# Patient Record
Sex: FEMALE | Race: WHITE | NOT HISPANIC OR LATINO | Employment: FULL TIME | URBAN - METROPOLITAN AREA
[De-identification: names, ages, dates, MRNs, and addresses within clinical notes are randomized per-mention and may not be internally consistent; named-entity substitution may affect disease eponyms.]

---

## 2017-05-01 DIAGNOSIS — Z30.41 ENCOUNTER FOR SURVEILLANCE OF CONTRACEPTIVE PILLS: ICD-10-CM

## 2017-05-02 NOTE — TELEPHONE ENCOUNTER
levonorgestrel-ethinyl estradiol (AVIANE,FEMI,LESSINA) 0.1-20 MG-MCG per tablet  Last Written Prescription Date: 9/28/16  Last Fill Quantity: 84,  # refills: 2   Last Office Visit with JULIET, MANINDER or [facility] prescribing provider:  6/8/2016                                              ELOINA Huang  May 2, 2017  8:05 AM

## 2017-05-03 RX ORDER — LEVONORGESTREL/ETHIN.ESTRADIOL 0.1-0.02MG
1 TABLET ORAL DAILY
Qty: 84 TABLET | Refills: 0 | Status: SHIPPED | OUTPATIENT
Start: 2017-05-03 | End: 2017-07-28

## 2017-07-26 DIAGNOSIS — Z30.41 ENCOUNTER FOR SURVEILLANCE OF CONTRACEPTIVE PILLS: ICD-10-CM

## 2017-07-26 RX ORDER — LEVONORGESTREL/ETHIN.ESTRADIOL 0.1-0.02MG
TABLET ORAL
Qty: 84 TABLET | Status: CANCELLED | OUTPATIENT
Start: 2017-07-26

## 2017-07-27 NOTE — TELEPHONE ENCOUNTER
L-RHIANNON/ETH ES TABS 28'S 0.1/20        Last Written Prescription Date: 05/03/17  Last Fill Quantity: 84,  # refills: 0   Last Office Visit with G, P or [facility] prescribing provider: 06/08/16 Dr. Gomez

## 2017-07-28 ENCOUNTER — TELEPHONE (OUTPATIENT)
Dept: FAMILY MEDICINE | Facility: CLINIC | Age: 27
End: 2017-07-28

## 2017-07-28 DIAGNOSIS — Z30.41 ENCOUNTER FOR SURVEILLANCE OF CONTRACEPTIVE PILLS: ICD-10-CM

## 2017-07-28 RX ORDER — LEVONORGESTREL/ETHIN.ESTRADIOL 0.1-0.02MG
1 TABLET ORAL DAILY
Status: SHIPPED
Start: 2017-07-28 | End: 2017-08-18

## 2017-07-28 RX ORDER — LEVONORGESTREL/ETHIN.ESTRADIOL 0.1-0.02MG
1 TABLET ORAL DAILY
Qty: 28 TABLET | Refills: 0 | Status: SHIPPED | OUTPATIENT
Start: 2017-07-28 | End: 2017-08-18

## 2017-07-28 NOTE — TELEPHONE ENCOUNTER
Patient called back.  Has moved to [place].  Wondering about closer option.  Gave Brianne Cecil number.  Sent one refill to [facility] for her.  Patient will call and schedule.  Katerin Fuentes, CATALINO    Conversation: Medication Refill   (Newest Message First)   July 28, 2017   Sebastian Calero, RN        11:46 AM   Note      Left message to call back.  Last OV 6/8/16.  Offer visit. Pap due 6/26/17.   Last Rx 5/3/17 90 days. Should have pills on hand through August.  Correct?  If she is out before a visit, would she like 28 to a  local pharmacy?  Sebastian Calero, RN

## 2017-07-28 NOTE — TELEPHONE ENCOUNTER
Left message to call back.  Last OV 6/8/16.  Offer visit. Pap due 6/26/17.   Last Rx 5/3/17 90 days. Should have pills on hand through August.  Correct?  If she is out before a visit, would she like 28 to a  local pharmacy?  Sebastian Calero RN

## 2017-07-28 NOTE — TELEPHONE ENCOUNTER
See telephone encounter.  Could not get it to work in here.  Note to mail order.  Katerin Fuentes RN

## 2017-08-04 NOTE — TELEPHONE ENCOUNTER
Fax from [facility]. It appears they are requesting refill of OCP again.  I sent fax back with note one month sent to local pharmacy, office visit required.    ALEXANDR Peña

## 2017-08-18 ENCOUNTER — OFFICE VISIT (OUTPATIENT)
Dept: FAMILY MEDICINE | Facility: CLINIC | Age: 27
End: 2017-08-18
Payer: COMMERCIAL

## 2017-08-18 VITALS
TEMPERATURE: 98.8 F | OXYGEN SATURATION: 100 % | SYSTOLIC BLOOD PRESSURE: 106 MMHG | HEART RATE: 82 BPM | DIASTOLIC BLOOD PRESSURE: 64 MMHG | BODY MASS INDEX: 28.56 KG/M2 | WEIGHT: 182 LBS | HEIGHT: 67 IN | RESPIRATION RATE: 16 BRPM

## 2017-08-18 DIAGNOSIS — Z00.00 ENCOUNTER FOR ROUTINE ADULT HEALTH EXAMINATION WITHOUT ABNORMAL FINDINGS: Primary | ICD-10-CM

## 2017-08-18 DIAGNOSIS — E66.3 OVERWEIGHT (BMI 25.0-29.9): ICD-10-CM

## 2017-08-18 DIAGNOSIS — Z30.41 ENCOUNTER FOR SURVEILLANCE OF CONTRACEPTIVE PILLS: ICD-10-CM

## 2017-08-18 DIAGNOSIS — N89.8 VAGINAL ITCHING: ICD-10-CM

## 2017-08-18 DIAGNOSIS — Z12.4 SCREENING FOR MALIGNANT NEOPLASM OF CERVIX: ICD-10-CM

## 2017-08-18 DIAGNOSIS — L91.0 KELOID SCAR: ICD-10-CM

## 2017-08-18 LAB
SPECIMEN SOURCE: NORMAL
WET PREP SPEC: NORMAL

## 2017-08-18 PROCEDURE — 87210 SMEAR WET MOUNT SALINE/INK: CPT | Performed by: PHYSICIAN ASSISTANT

## 2017-08-18 PROCEDURE — 99395 PREV VISIT EST AGE 18-39: CPT | Performed by: PHYSICIAN ASSISTANT

## 2017-08-18 PROCEDURE — G0145 SCR C/V CYTO,THINLAYER,RESCR: HCPCS | Performed by: PHYSICIAN ASSISTANT

## 2017-08-18 RX ORDER — LEVONORGESTREL/ETHIN.ESTRADIOL 0.1-0.02MG
1 TABLET ORAL DAILY
Qty: 84 TABLET | Refills: 3 | Status: SHIPPED | OUTPATIENT
Start: 2017-08-18 | End: 2018-07-20

## 2017-08-18 NOTE — LETTER
August 18, 2017        Lizzeth Alarcon  [address] [place] [address]    Yusef Moreau,    I have reviewed your recent labs. Here are the results:    -Your vaginal swab was negative for any yeast or bacteria.     If you have any questions please do not hesitate to contact our office via phone ([phone]) or Jybehart by clicking the contact my Care Team link.    If you have further questions about the interpretation of your lab results, [email or web address] is a great website to check out.    Thank you for allowing me to participate in your care!    ALTA De León, PAHannyC  [facility]

## 2017-08-18 NOTE — PROGRESS NOTES
"Chief Complaint   Patient presents with     Physical       Initial /64  Pulse 82  Temp 98.8  F (37.1  C)  Resp 16  Ht 5' 7\" (1.702 m)  Wt 182 lb (82.6 kg)  LMP 07/24/2017 (Exact Date)  SpO2 100%  BMI 28.51 kg/m2 Estimated body mass index is 28.51 kg/(m^2) as calculated from the following:    Height as of this encounter: 5' 7\" (1.702 m).    Weight as of this encounter: 182 lb (82.6 kg).  Medication Reconciliation: complete. FER Strickland LPN       SUBJECTIVE:   CC: Lizzeth Alarcon is an 27 year old woman who presents for preventive health visit.     Healthy Habits:    Do you get at least three servings of calcium containing foods daily (dairy, green leafy vegetables, etc.)? yes    Amount of exercise or daily activities, outside of work: 0 day(s) per week    Problems taking medications regularly No    Medication side effects: No    Have you had an eye exam in the past two years? yes    Do you see a dentist twice per year? yes    Do you have sleep apnea, excessive snoring or daytime drowsiness?no          -------------------------------------    Today's PHQ-2 Score: PHQ-2 ( 1999 [employer]) 8/18/2017 6/8/2016   Q1: Little interest or pleasure in doing things 0 0   Q2: Feeling down, depressed or hopeless 0 0   PHQ-2 Score 0 0         Abuse: Current or Past(Physical, Sexual or Emotional)- No  Do you feel safe in your environment - Yes  Social History   Substance Use Topics     Smoking status: Never Smoker     Smokeless tobacco: Never Used      Comment: no second hand smoke     Alcohol use Yes      Comment: 3-4/week     The patient does not drink >3 drinks per day nor >7 drinks per week.    Reviewed orders with patient.  Reviewed health maintenance and updated orders accordingly - Yes    FER Strickland LPN        Labs reviewed in EPIC  Patient Active Problem List   Diagnosis     Acne     Contraceptive management     Keloid scar     Contraception     BMI > 25     Past Surgical History:   Procedure Laterality Date     NO " HISTORY OF SURGERY         Social History   Substance Use Topics     Smoking status: Never Smoker     Smokeless tobacco: Never Used      Comment: no second hand smoke     Alcohol use Yes      Comment: 3-4/week     Family History   Problem Relation Age of Onset     Thyroid Disease Mother      Respiratory Father      Asthma     Respiratory Brother      Exercised induced Asthma     Colon Cancer No family hx of      Breast Cancer No family hx of      DIABETES No family hx of      Myocardial Infarction No family hx of          Current Outpatient Prescriptions   Medication Sig Dispense Refill     levonorgestrel-ethinyl estradiol (AVIANE,ALESSE,LESSINA) 0.1-20 MG-MCG per tablet Take 1 tablet by mouth daily 84 tablet 3     [DISCONTINUED] levonorgestrel-ethinyl estradiol (AVIANE,ALESSE,LESSINA) 0.1-20 MG-MCG per tablet Take 1 tablet by mouth daily 28 tablet 0     [DISCONTINUED] levonorgestrel-ethinyl estradiol (AVIANE,ALESSE,LESSINA) 0.1-20 MG-MCG per tablet Take 1 tablet by mouth daily (Patient not taking: Reported on 8/18/2017)       Allergies   Allergen Reactions     Minocycline      Joint pains         Mammogram not appropriate for this patient based on age.    Pertinent mammograms are reviewed under the imaging tab.  History of abnormal Pap smear: NO - age 21-29 PAP every 3 years recommended    Reviewed and updated as needed this visit by clinical staffTobacco  Allergies  Meds  Fam Hx  Soc Hx        Reviewed and updated as needed this visit by Provider              ROS:  C: NEGATIVE for fever, chills, change in weight  I: NEGATIVE for worrisome rashes, moles or lesions  E: NEGATIVE for vision changes or irritation  ENT: NEGATIVE for ear, mouth and throat problems  R: NEGATIVE for significant cough or SOB  B: NEGATIVE for masses, tenderness or discharge  CV: NEGATIVE for chest pain, palpitations or peripheral edema  GI: NEGATIVE for nausea, abdominal pain, heartburn, or change in bowel habits  : NEGATIVE for  "unusual urinary or vaginal symptoms. Periods are regular.  M: NEGATIVE for significant arthralgias or myalgia  N: NEGATIVE for weakness, dizziness or paresthesias  P: NEGATIVE for changes in mood or affect    OBJECTIVE:   /64  Pulse 82  Temp 98.8  F (37.1  C)  Resp 16  Ht 5' 7\" (1.702 m)  Wt 182 lb (82.6 kg)  LMP 07/24/2017 (Exact Date)  SpO2 100%  BMI 28.51 kg/m2  EXAM:  GENERAL: healthy, alert and no distress  EYES: Eyes grossly normal to inspection, PERRL and conjunctivae and sclerae normal  HENT: ear canals and TM's normal, nose and mouth without ulcers or lesions  NECK: no adenopathy, no asymmetry, masses, or scars and thyroid normal to palpation  RESP: lungs clear to auscultation - no rales, rhonchi or wheezes  BREAST: normal without masses, tenderness or nipple discharge and no palpable axillary masses or adenopathy  CV: regular rate and rhythm, normal S1 S2, no S3 or S4, no murmur, click or rub, no peripheral edema and peripheral pulses strong  ABDOMEN: soft, nontender, no hepatosplenomegaly, no masses and bowel sounds normal   (female): normal female external genitalia, normal urethral meatus, vaginal mucosa pink, moist, well rugated, and normal cervix/adnexa/uterus without masses or discharge. Pap collected.   MS: no gross musculoskeletal defects noted, no edema  SKIN: no suspicious lesions or rashes. Keloid scarring on chest and left upper back.   NEURO: Normal strength and tone, mentation intact and speech normal  PSYCH: mentation appears normal, affect normal/bright    ASSESSMENT/PLAN:   Lizzeth was seen today for physical.    Diagnoses and all orders for this visit:    Encounter for routine adult health examination without abnormal findings    Screening for malignant neoplasm of cervix  -     Pap imaged thin layer screen reflex to HPV if ASCUS - recommend age 25 - 29    Encounter for surveillance of contraceptive pills  -     levonorgestrel-ethinyl estradiol (AVIANE,ALESSE,LESSINA) " "0.1-20 MG-MCG per tablet; Take 1 tablet by mouth daily    BMI > 25    Keloid scar      COME FASTING NEXT YR FOR LABS. *    COUNSELING:   Reviewed preventive health counseling, as reflected in patient instructions    BP Screening:   Last 3 BP Readings:    BP Readings from Last 3 Encounters:   08/18/17 106/64   06/08/16 125/88   08/28/15 116/74       The following was recommended to the patient:  Re-screen BP within a year and recommended lifestyle modifications     reports that she has never smoked. She has never used smokeless tobacco.    Estimated body mass index is 28.51 kg/(m^2) as calculated from the following:    Height as of this encounter: 5' 7\" (1.702 m).    Weight as of this encounter: 182 lb (82.6 kg).   Weight management plan: Discussed healthy diet and exercise guidelines and patient will follow up in 12 months in clinic to re-evaluate.    Counseling Resources:  ATP IV Guidelines  Pooled Cohorts Equation Calculator  Breast Cancer Risk Calculator  FRAX Risk Assessment  ICSI Preventive Guidelines  Dietary Guidelines for Americans, 2010  USDA's MyPlate  ASA Prophylaxis  Lung CA Screening    Stella Espinoza PA-C  [facility]  "

## 2017-08-18 NOTE — MR AVS SNAPSHOT
After Visit Summary   8/18/2017    Lizzeth Alarcon    MRN: [id number]           Patient Information     Date Of Birth          1990        Visit Information        Provider Department      8/18/2017 7:40 AM Stella Espinoza PA-C [facility]        Today's Diagnoses     Screening for malignant neoplasm of cervix    -  1    Encounter for surveillance of contraceptive pills          Care Instructions      Preventive Health Recommendations  Female Ages 26 - 39  Yearly exam:   See your health care provider every year in order to    Review health changes.     Discuss preventive care.      Review your medicines if you your doctor has prescribed any.    Until age 30: Get a Pap test every three years (more often if you have had an abnormal result).    After age 30: Talk to your doctor about whether you should have a Pap test every 3 years or have a Pap test with HPV screening every 5 years.   You do not need a Pap test if your uterus was removed (hysterectomy) and you have not had cancer.  You should be tested each year for STDs (sexually transmitted diseases), if you're at risk.   Talk to your provider about how often to have your cholesterol checked.  If you are at risk for diabetes, you should have a diabetes test (fasting glucose).  Shots: Get a flu shot each year. Get a tetanus shot every 10 years.   Nutrition:     Eat at least 5 servings of fruits and vegetables each day.    Eat whole-grain bread, whole-wheat pasta and brown rice instead of white grains and rice.    Talk to your provider about Calcium and Vitamin D.     Lifestyle    Exercise at least 150 minutes a week (30 minutes a day, 5 days of the week). This will help you control your weight and prevent disease.    Limit alcohol to one drink per day.    No smoking.     Wear sunscreen to prevent skin cancer.    See your dentist every six months for an exam and cleaning.            Follow-ups after your visit        Who  "to contact     If you have questions or need follow up information about today's clinic visit or your schedule please contact [facility] [place] directly at [phone].  Normal or non-critical lab and imaging results will be communicated to you by MyChart, letter or phone within 4 business days after the clinic has received the results. If you do not hear from us within 7 days, please contact the clinic through MyChart or phone. If you have a critical or abnormal lab result, we will notify you by phone as soon as possible.  Submit refill requests through [facility] or call your pharmacy and they will forward the refill request to us. Please allow 3 business days for your refill to be completed.          Additional Information About Your Visit        [facility] [facility] Information     [facility] lets you send messages to your doctor, view your test results, renew your prescriptions, schedule appointments and more. To sign up, go to [email or web address] . Click on \"Log in\" on the left side of the screen, which will take you to the Welcome page. Then click on \"Sign up Now\" on the right side of the page.     You will be asked to enter the access code listed below, as well as some personal information. Please follow the directions to create your username and password.     Your access code is: [email or web address]  Expires: 2017  8:04 AM     Your access code will  in 90 days. If you need help or a new code, please call your [place] clinic or [phone].        Care EveryWhere ID     This is your Care EveryWhere ID. This could be used by other organizations to access your [place] medical records  [id number]        Your Vitals Were     Pulse Temperature Respirations Height Last Period Pulse Oximetry    82 98.8  F (37.1  C) 16 5' 7\" (1.702 m) 2017 (Exact Date) 100%    BMI (Body Mass Index)                   28.51 kg/m2            Blood Pressure from Last 3 Encounters:   17 106/64   16 125/88 "   08/28/15 116/74    Weight from Last 3 Encounters:   08/18/17 182 lb (82.6 kg)   06/08/16 158 lb (71.7 kg)   08/28/15 153 lb 9.6 oz (69.7 kg)              We Performed the Following     Pap imaged thin layer screen reflex to HPV if ASCUS - recommend age 25 - 29          Where to get your medicines      Some of these will need a paper prescription and others can be bought over the counter.  Ask your nurse if you have questions.     Bring a paper prescription for each of these medications     levonorgestrel-ethinyl estradiol 0.1-20 MG-MCG per tablet          Primary Care Provider Office Phone # Fax #    Stella Espinoza PA-C [phone]       [address]  [place] [address]        Equal Access to Services     [facility]: Hadii jennifer Cavazos, waaxda luawilda, qaybta kaalmakaren mcleod, muriel tate . So [facility] [phone].    ATENCIÓN: Si habla español, tiene a rai disposición servicios gratuitos de asistencia lingüística. [facility] [phone].    We comply with applicable federal civil rights laws and [place] laws. We do not discriminate on the basis of race, color, national origin, age, disability sex, sexual orientation or gender identity.            Thank you!     Thank you for choosing [facility] [place]  for your care. Our goal is always to provide you with excellent care. Hearing back from our patients is one way we can continue to improve our services. Please take a few minutes to complete the written survey that you may receive in the mail after your visit with us. Thank you!             Your Updated Medication List - Protect others around you: Learn how to safely use, store and throw away your medicines at www.disposemymeds.org.          This list is accurate as of: 8/18/17  8:04 AM.  Always use your most recent med list.                   Brand Name Dispense Instructions for use Diagnosis    levonorgestrel-ethinyl  estradiol 0.1-20 MG-MCG per tablet    AVIANE,ALESSE,LESSINA    84 tablet    Take 1 tablet by mouth daily    Encounter for surveillance of contraceptive pills

## 2017-08-18 NOTE — LETTER
August 31, 2017      Lizzeth Dorian  [address] [place] [address]    Dear ,      I am happy to inform you that your recent cervical cancer screening test (PAP smear) was normal.      Preventative screenings such as this help to ensure your health for years to come. You should repeat a pap smear in 3 years, unless otherwise directed.      You will still need to return to the clinic every year for your annual exam and other preventive tests.     Please contact the clinic at [phone] if you have further questions.       Sincerely,      Stella Espinoza PA-C/froilan

## 2017-08-22 LAB
COPATH REPORT: NORMAL
PAP: NORMAL

## 2018-07-20 DIAGNOSIS — Z30.41 ENCOUNTER FOR SURVEILLANCE OF CONTRACEPTIVE PILLS: ICD-10-CM

## 2018-07-20 NOTE — TELEPHONE ENCOUNTER
"Requested Prescriptions   Pending Prescriptions Disp Refills     levonorgestrel-ethinyl estradiol (AVIANE,FEMI,LESSINA) 0.1-20 MG-MCG per tablet  Last Written Prescription Date:  8/8/17  Last Fill Quantity: 84,  # refills: 3   Last office visit: 8/18/2017 with prescribing provider:  melvina   Future Office Visit:     84 tablet 3     Sig: Take 1 tablet by mouth daily    Contraceptives Protocol Failed    7/20/2018  8:32 AM       Failed - Recent (12 mo) or future (30 days) visit within the authorizing provider's specialty    Patient had office visit in the last 12 months or has a visit in the next 30 days with authorizing provider or within the authorizing provider's specialty.  See \"Patient Info\" tab in inbasket, or \"Choose Columns\" in Meds & Orders section of the refill encounter.           Passed - Patient is not a current smoker if age is 35 or older       Passed - No active pregnancy on record       Passed - No positive pregnancy test in past 12 months          "

## 2018-07-23 RX ORDER — LEVONORGESTREL/ETHIN.ESTRADIOL 0.1-0.02MG
1 TABLET ORAL DAILY
Qty: 28 TABLET | Refills: 0 | Status: SHIPPED | OUTPATIENT
Start: 2018-07-23 | End: 2018-08-21

## 2018-07-23 NOTE — TELEPHONE ENCOUNTER
30 day supply given.  Patient is due for an OV.  Please call and assist with scheduling appointment prior to next refill   Tali Nieves RN - Triage  [facility]

## 2018-08-21 ENCOUNTER — OFFICE VISIT (OUTPATIENT)
Dept: FAMILY MEDICINE | Facility: CLINIC | Age: 28
End: 2018-08-21
Payer: COMMERCIAL

## 2018-08-21 VITALS
OXYGEN SATURATION: 100 % | HEART RATE: 60 BPM | DIASTOLIC BLOOD PRESSURE: 78 MMHG | BODY MASS INDEX: 25.43 KG/M2 | HEIGHT: 67 IN | SYSTOLIC BLOOD PRESSURE: 115 MMHG | WEIGHT: 162 LBS | TEMPERATURE: 99.1 F

## 2018-08-21 DIAGNOSIS — Z00.00 ANNUAL VISIT FOR GENERAL ADULT MEDICAL EXAMINATION WITHOUT ABNORMAL FINDINGS: Primary | ICD-10-CM

## 2018-08-21 DIAGNOSIS — Z30.41 ENCOUNTER FOR SURVEILLANCE OF CONTRACEPTIVE PILLS: ICD-10-CM

## 2018-08-21 PROCEDURE — 99395 PREV VISIT EST AGE 18-39: CPT | Performed by: INTERNAL MEDICINE

## 2018-08-21 RX ORDER — LEVONORGESTREL/ETHIN.ESTRADIOL 0.1-0.02MG
1 TABLET ORAL DAILY
Qty: 84 TABLET | Refills: 3 | Status: SHIPPED | OUTPATIENT
Start: 2018-08-21 | End: 2019-06-03

## 2018-08-21 NOTE — PROGRESS NOTES
Answers for HPI/ROS submitted by the patient on 8/20/2018   Annual Exam:  Getting at least 3 servings of Calcium per day:: Yes  Bi-annual eye exam:: Yes  Dental care twice a year:: Yes  Sleep apnea or symptoms of sleep apnea:: None  Frequency of exercise:: 4-5 days/week  Taking medications regularly:: Yes  Medication side effects:: None  Additional concerns today:: No  PHQ-2 Score: 0  Duration of exercise:: 45-60 minutes

## 2018-08-21 NOTE — PROGRESS NOTES
SUBJECTIVE:   CC: Lizzeth Alarcon is an 28 year old woman who presents for preventive health visit.     Physical   Annual:     Getting at least 3 servings of Calcium per day:  Yes    Bi-annual eye exam:  Yes    Dental care twice a year:  Yes    Sleep apnea or symptoms of sleep apnea:  None    Frequency of exercise:  4-5 days/week    Duration of exercise:  45-60 minutes    Taking medications regularly:  Yes    Medication side effects:  None    Additional concerns today:  No        Today's PHQ-2 Score:   PHQ-2 ( 1999 [employer]) 8/20/2018   Q1: Little interest or pleasure in doing things 0   Q2: Feeling down, depressed or hopeless 0   PHQ-2 Score 0   Q1: Little interest or pleasure in doing things Not at all   Q2: Feeling down, depressed or hopeless Not at all   PHQ-2 Score 0       Abuse: Current or Past(Physical, Sexual or Emotional)- No  Do you feel safe in your environment - Yes    Social History   Substance Use Topics     Smoking status: Never Smoker     Smokeless tobacco: Never Used      Comment: no second hand smoke     Alcohol use Yes      Comment: 3-4/week     Alcohol Use 8/20/2018   If you drink alcohol do you typically have greater than 3 drinks per day OR greater than 7 drinks per week? No       Reviewed orders with patient.  Reviewed health maintenance and updated orders accordingly - Yes  BP Readings from Last 3 Encounters:   08/21/18 115/78   08/18/17 106/64   06/08/16 125/88    Wt Readings from Last 3 Encounters:   08/21/18 162 lb (73.5 kg)   08/18/17 182 lb (82.6 kg)   06/08/16 158 lb (71.7 kg)                  Patient Active Problem List   Diagnosis     Acne     Contraceptive management     Keloid scar     Contraception     BMI > 25     Past Surgical History:   Procedure Laterality Date     NO HISTORY OF SURGERY         Social History   Substance Use Topics     Smoking status: Never Smoker     Smokeless tobacco: Never Used      Comment: no second hand smoke     Alcohol use Yes      Comment: 3-4/week     " Family History   Problem Relation Age of Onset     Thyroid Disease Mother      Respiratory Father      Asthma     Respiratory Brother      Exercised induced Asthma     Colon Cancer No family hx of      Breast Cancer No family hx of      Diabetes No family hx of      Myocardial Infarction No family hx of          Current Outpatient Prescriptions   Medication Sig Dispense Refill     levonorgestrel-ethinyl estradiol (AVIANE,ALESSE,LESSINA) 0.1-20 MG-MCG per tablet Take 1 tablet by mouth daily 84 tablet 3     [DISCONTINUED] levonorgestrel-ethinyl estradiol (AVIANE,ALESSE,LESSINA) 0.1-20 MG-MCG per tablet Take 1 tablet by mouth daily 28 tablet 0       Mammogram not appropriate for this patient based on age.    Pertinent mammograms are reviewed under the imaging tab.  History of abnormal Pap smear: NO - age 21-29 PAP every 3 years recommended  PAP / HPV 8/18/2017 6/26/2014 1/15/2009   PAP NIL NIL NIL     Reviewed and updated as needed this visit by clinical staff  Allergies  Meds         Reviewed and updated as needed this visit by Provider            Review of Systems  CONSTITUTIONAL: NEGATIVE for fever, chills, change in weight  INTEGUMENTARU/SKIN: NEGATIVE for worrisome rashes, moles or lesions  EYES: NEGATIVE for vision changes or irritation  ENT: NEGATIVE for ear, mouth and throat problems  RESP: NEGATIVE for significant cough or SOB  BREAST: NEGATIVE for masses, tenderness or discharge  CV: NEGATIVE for chest pain, palpitations or peripheral edema  GI: NEGATIVE for nausea, abdominal pain, heartburn, or change in bowel habits  : NEGATIVE for unusual urinary or vaginal symptoms. Periods are regular.  MUSCULOSKELETAL: NEGATIVE for significant arthralgias or myalgia  NEURO: NEGATIVE for weakness, dizziness or paresthesias  PSYCHIATRIC: NEGATIVE for changes in mood or affect     OBJECTIVE:   /78 (BP Location: Right arm, Cuff Size: Adult Regular)  Pulse 60  Temp 99.1  F (37.3  C) (Oral)  Ht 5' 7\" (1.702 m)  " "Wt 162 lb (73.5 kg)  LMP 07/31/2018  SpO2 100%  BMI 25.37 kg/m2  Physical Exam  GENERAL: healthy, alert and no distress  EYES: Eyes grossly normal to inspection, PERRL and conjunctivae and sclerae normal  HENT: ear canals and TM's normal, nose and mouth without ulcers or lesions  NECK: no adenopathy, no asymmetry, masses, or scars and thyroid normal to palpation  RESP: lungs clear to auscultation - no rales, rhonchi or wheezes  BREAST: normal without masses, tenderness or nipple discharge and no palpable axillary masses or adenopathy  CV: regular rate and rhythm, normal S1 S2, no S3 or S4, no murmur, click or rub, no peripheral edema and peripheral pulses strong  ABDOMEN: soft, nontender, no hepatosplenomegaly, no masses and bowel sounds normal  MS: no gross musculoskeletal defects noted, no edema  SKIN: no suspicious lesions or rashes  NEURO: Normal strength and tone, mentation intact and speech normal  PSYCH: mentation appears normal, affect normal/bright    Diagnostic Test Results:  none     ASSESSMENT/PLAN:   1. Annual visit for general adult medical examination without abnormal findings    2. Encounter for surveillance of contraceptive pills  - levonorgestrel-ethinyl estradiol (AVIANE,ALESSE,LESSINA) 0.1-20 MG-MCG per tablet; Take 1 tablet by mouth daily  Dispense: 84 tablet; Refill: 3    COUNSELING:  Reviewed preventive health counseling, as reflected in patient instructions       Regular exercise       Healthy diet/nutrition       Contraception       Osteoporosis Prevention/Bone Health    BP Readings from Last 1 Encounters:   08/18/17 106/64     Estimated body mass index is 28.51 kg/(m^2) as calculated from the following:    Height as of 8/18/17: 5' 7\" (1.702 m).    Weight as of 8/18/17: 182 lb (82.6 kg).           reports that she has never smoked. She has never used smokeless tobacco.      Counseling Resources:  ATP IV Guidelines  Pooled Cohorts Equation Calculator  Breast Cancer Risk Calculator  FRAX " Risk Assessment  ICSI Preventive Guidelines  Dietary Guidelines for Americans, 2010  USDA's MyPlate  ASA Prophylaxis  Lung CA Screening    Stella Jones MD  [facility] [place]  Answers for HPI/ROS submitted by the patient on 8/20/2018   PHQ-2 Score: 0

## 2018-08-21 NOTE — MR AVS SNAPSHOT
After Visit Summary   8/21/2018    Lizzeth Alarcon    MRN: [id number]           Patient Information     Date Of Birth          1990        Visit Information        Provider Department      8/21/2018 8:00 AM Stella Jones MD [facility]        Today's Diagnoses     Annual visit for general adult medical examination without abnormal findings    -  1    Encounter for surveillance of contraceptive pills          Care Instructions      Preventive Health Recommendations  Female Ages 26 - 39  Yearly exam:   See your health care provider every year in order to    Review health changes.     Discuss preventive care.      Review your medicines if you your doctor has prescribed any.    Until age 30: Get a Pap test every three years (more often if you have had an abnormal result).    After age 30: Talk to your doctor about whether you should have a Pap test every 3 years or have a Pap test with HPV screening every 5 years.   You do not need a Pap test if your uterus was removed (hysterectomy) and you have not had cancer.  You should be tested each year for STDs (sexually transmitted diseases), if you're at risk.   Talk to your provider about how often to have your cholesterol checked.  If you are at risk for diabetes, you should have a diabetes test (fasting glucose).  Shots: Get a flu shot each year. Get a tetanus shot every 10 years.   Nutrition:     Eat at least 5 servings of fruits and vegetables each day.    Eat whole-grain bread, whole-wheat pasta and brown rice instead of white grains and rice.    Get adequate Calcium and Vitamin D.     Lifestyle    Exercise at least 150 minutes a week (30 minutes a day, 5 days of the week). This will help you control your weight and prevent disease.    Limit alcohol to one drink per day.    No smoking.     Wear sunscreen to prevent skin cancer.    See your dentist every six months for an exam and cleaning.            Follow-ups after your visit    "     Follow-up notes from your care team     Return in about 1 year (around 8/21/2019) for Physical Exam.      Who to contact     If you have questions or need follow up information about today's clinic visit or your schedule please contact [facility] [place] directly at [phone].  Normal or non-critical lab and imaging results will be communicated to you by MyChart, letter or phone within 4 business days after the clinic has received the results. If you do not hear from us within 7 days, please contact the clinic through [employer] or phone. If you have a critical or abnormal lab result, we will notify you by phone as soon as possible.  Submit refill requests through eEye or call your pharmacy and they will forward the refill request to us. Please allow 3 business days for your refill to be completed.          Additional Information About Your Visit        MyChart Information     eEye gives you secure access to your electronic health record. If you see a primary care provider, you can also send messages to your care team and make appointments. If you have questions, please call your primary care clinic.  If you do not have a primary care provider, please call [phone] and they will assist you.        Care EveryWhere ID     This is your Care EveryWhere ID. This could be used by other organizations to access your [facility] medical records  [id number]        Your Vitals Were     Pulse Temperature Height Last Period Pulse Oximetry BMI (Body Mass Index)    60 99.1  F (37.3  C) (Oral) 5' 7\" (1.702 m) 07/31/2018 100% 25.37 kg/m2       Blood Pressure from Last 3 Encounters:   08/21/18 115/78   08/18/17 106/64   06/08/16 125/88    Weight from Last 3 Encounters:   08/21/18 162 lb (73.5 kg)   08/18/17 182 lb (82.6 kg)   06/08/16 158 lb (71.7 kg)              Today, you had the following     No orders found for display         Where to get your medicines      These medications were sent to [facility] - [place], [place] - [address] [place] [address]  [address] [place] [id number]     Phone:  [phone]     levonorgestrel-ethinyl estradiol 0.1-20 MG-MCG per tablet          Primary Care Provider Office Phone # Fax #    [facility] [phone]       [address] [facility] [address]        Equal Access to Services     CELSO FORTE : Hadii aad ku hadasho Soomaali, waaxda luqadaha, qaybta kaalmada adeegyada, waxay idiin hayaan adeeg kharash la'aan . So [facility] [phone].    ATENCIÓN: Si habla español, tiene a rai disposición servicios gratuitos de asistencia lingüística. [facility] [phone].    We comply with applicable federal civil rights laws and [place] laws. We do not discriminate on the basis of race, color, national origin, age, disability, sex, sexual orientation, or gender identity.            Thank you!     Thank you for choosing [facility]  for your care. Our goal is always to provide you with excellent care. Hearing back from our patients is one way we can continue to improve our services. Please take a few minutes to complete the written survey that you may receive in the mail after your visit with us. Thank you!             Your Updated Medication List - Protect others around you: Learn how to safely use, store and throw away your medicines at www.disposemymeds.org.          This list is accurate as of 8/21/18  8:21 AM.  Always use your most recent med list.                   Brand Name Dispense Instructions for use Diagnosis    levonorgestrel-ethinyl estradiol 0.1-20 MG-MCG per tablet    AVIANE,ALESSE,LESSINA    84 tablet    Take 1 tablet by mouth daily    Encounter for surveillance of contraceptive pills

## 2019-06-03 ENCOUNTER — OFFICE VISIT (OUTPATIENT)
Dept: FAMILY MEDICINE | Facility: CLINIC | Age: 29
End: 2019-06-03
Payer: COMMERCIAL

## 2019-06-03 VITALS — DIASTOLIC BLOOD PRESSURE: 70 MMHG | SYSTOLIC BLOOD PRESSURE: 110 MMHG

## 2019-06-03 DIAGNOSIS — Z92.29 HISTORY OF ISOTRETINOIN THERAPY: ICD-10-CM

## 2019-06-03 DIAGNOSIS — L70.0 ACNE VULGARIS: Primary | ICD-10-CM

## 2019-06-03 PROCEDURE — 99214 OFFICE O/P EST MOD 30 MIN: CPT | Performed by: FAMILY MEDICINE

## 2019-06-03 RX ORDER — SPIRONOLACTONE 50 MG/1
50 TABLET, FILM COATED ORAL DAILY
Qty: 90 TABLET | Refills: 1 | Status: SHIPPED | OUTPATIENT
Start: 2019-06-03 | End: 2019-07-22

## 2019-06-03 RX ORDER — DESOGESTREL AND ETHINYL ESTRADIOL 0.15-0.03
1 KIT ORAL DAILY
Refills: 0 | COMMUNITY
Start: 2019-05-19 | End: 2023-05-26

## 2019-06-03 NOTE — PATIENT INSTRUCTIONS
FUTURE APPOINTMENTS  Follow up in 6-8 weeks.    ORAL MEDICATION  Take by mouth 1 capsule(s)/tablet(s) of spironolactone 50 mg once daily.

## 2019-06-03 NOTE — LETTER
6/3/2019         RE: Lizzeth Alarcon  [address] [place] [address]        Dear Colleague,    Thank you for referring your patient, Lizzeth Alarcon, to the [facility] [place]. Please see a copy of my visit note below.    [facility] - PRIMARY CARE SKIN    CC: Acne  SUBJECTIVE:   Lizzeth Alarcon is a(n) 29 year old female who presents to clinic today because of acne.    Symptoms have been ongoing for: years. Acne relapsed shortly after finishing oral isotretinoin in the past (10 years ago).  The acne is primarily located on the: back, chest and face. Acne is more of an issue on the face then back and chest.  Acne generally presents as: closed comedone(s) and pimple(s).    Current treatment: Differin, Neutrogena cleansers, OCP.  Response to treatment: Acne is not well controlled.  Side effects noted: None.    Previous treatments include: A course of oral isotretinoin was taken approximately 10 years ago.  clindamycin 1% gel, Benazaclin, minocycline  She is on oral hormonal contraceptive levonorgestrel-ethinyl estradiol 0.1-0.02 mg.    Skin type: oily.  Family history of acne: YES.  Risk factors for acne: menstrual cycle.    Refer to electronic medical record (EMR) for past medical history and medications.    INTEGUMENTARY/SKIN: POSITIVE for acne  ROS: 14 point review of systems was negative except the symptoms listed above in the HPI.    This document serves as a record of the services and decisions personally performed and made by Shobha Guzman MD and was created by Abilio Ramírez, a trained medical scribe, based on personal observations and provider statements to the medical scribe.  Mikaela 3, 2019 4:13 PM   Abilio Ramírez    OBJECTIVE:   GENERAL: healthy, alert and no distress.  SKIN: Que[place] Skin Type - II.  Face, Neck and Trunk examined. The dermatoscope was used to help evaluate pigmented lesions.  Skin Pertinent Findings:  Face: Multiple scattered inflammatory papules.  Some closed comedones. Cheeks, chin and forehead. Moderate scarring  Chest: Keloid on the mid-chest  4 cm X 3 cm  Back: Few scattered inflammatory papules.    Diagnostic Test Results:  none     ASSESSMENT:     Encounter Diagnoses   Name Primary?     Acne vulgaris Yes     History of isotretinoin therapy      MDM: . Discussed pathophysiology of acne, treatment options, and expectations for treatment response. Consider repeat course of oral isotretinoin .    PLAN:   Patient Instructions   FUTURE APPOINTMENTS  Follow up in 6-8 weeks.    ORAL MEDICATION  Take by mouth 1 capsule(s)/tablet(s) of spironolactone 50 mg once daily.    TT: 20 minutes.  CT: 15 minutes.    The information in this document, created by the medical scribe for me, accurately reflects the services I personally performed and the decisions made by me. I have reviewed and approved this document for accuracy prior to leaving the patient care area.  Mikaela 3, 2019 4:13 PM  Shobha Guzman MD  [facility]    Again, thank you for allowing me to participate in the care of your patient.        Sincerely,        Shobha Guzman MD

## 2019-06-03 NOTE — PROGRESS NOTES
[facility] - PRIMARY CARE SKIN    CC: Acne  SUBJECTIVE:   Lizzeth Alarcon is a(n) 29 year old female who presents to clinic today because of acne.    Symptoms have been ongoing for: years. Acne relapsed shortly after finishing oral isotretinoin in the past (10 years ago).  The acne is primarily located on the: back, chest and face. Acne is more of an issue on the face then back and chest.  Acne generally presents as: closed comedone(s) and pimple(s).    Current treatment: Differin, Neutrogena cleansers, OCP.  Response to treatment: Acne is not well controlled.  Side effects noted: None.    Previous treatments include: A course of oral isotretinoin was taken approximately 10 years ago.  clindamycin 1% gel, Benazaclin, minocycline  She is on oral hormonal contraceptive levonorgestrel-ethinyl estradiol 0.1-0.02 mg.    Skin type: oily.  Family history of acne: YES.  Risk factors for acne: menstrual cycle.    Refer to electronic medical record (EMR) for past medical history and medications.    INTEGUMENTARY/SKIN: POSITIVE for acne  ROS: 14 point review of systems was negative except the symptoms listed above in the HPI.    This document serves as a record of the services and decisions personally performed and made by Shobha Guzman MD and was created by Abilio Ramírez, a trained medical scribe, based on personal observations and provider statements to the medical scribe.  Mikaela 3, 2019 4:13 PM   Abilio Ramírez    OBJECTIVE:   GENERAL: healthy, alert and no distress.  SKIN: Quezada Skin Type - II.  Face, Neck and Trunk examined. The dermatoscope was used to help evaluate pigmented lesions.  Skin Pertinent Findings:  Face: Multiple scattered inflammatory papules. Some closed comedones. Cheeks, chin and forehead. Moderate scarring  Chest: Keloid on the mid-chest  4 cm X 3 cm  Back: Few scattered inflammatory papules.    Diagnostic Test Results:  none     ASSESSMENT:     Encounter Diagnoses   Name Primary?     Acne  vulgaris Yes     History of isotretinoin therapy      MDM: . Discussed pathophysiology of acne, treatment options, and expectations for treatment response. Consider repeat course of oral isotretinoin .    PLAN:   Patient Instructions   FUTURE APPOINTMENTS  Follow up in 6-8 weeks.    ORAL MEDICATION  Take by mouth 1 capsule(s)/tablet(s) of spironolactone 50 mg once daily.    TT: 20 minutes.  CT: 15 minutes.    The information in this document, created by the medical scribe for me, accurately reflects the services I personally performed and the decisions made by me. I have reviewed and approved this document for accuracy prior to leaving the patient care area.  Mikaela 3, 2019 4:13 PM  Shobha Guzman MD  [facility]

## 2019-07-22 ENCOUNTER — OFFICE VISIT (OUTPATIENT)
Dept: FAMILY MEDICINE | Facility: CLINIC | Age: 29
End: 2019-07-22
Payer: COMMERCIAL

## 2019-07-22 VITALS — SYSTOLIC BLOOD PRESSURE: 128 MMHG | DIASTOLIC BLOOD PRESSURE: 62 MMHG

## 2019-07-22 DIAGNOSIS — L70.0 ACNE VULGARIS: ICD-10-CM

## 2019-07-22 PROCEDURE — 99213 OFFICE O/P EST LOW 20 MIN: CPT | Performed by: FAMILY MEDICINE

## 2019-07-22 PROCEDURE — 36415 COLL VENOUS BLD VENIPUNCTURE: CPT | Performed by: FAMILY MEDICINE

## 2019-07-22 PROCEDURE — 84132 ASSAY OF SERUM POTASSIUM: CPT | Performed by: FAMILY MEDICINE

## 2019-07-22 PROCEDURE — 82565 ASSAY OF CREATININE: CPT | Performed by: FAMILY MEDICINE

## 2019-07-22 RX ORDER — SPIRONOLACTONE 50 MG/1
100 TABLET, FILM COATED ORAL DAILY
Qty: 180 TABLET | Refills: 1 | Status: SHIPPED | OUTPATIENT
Start: 2019-07-22 | End: 2019-10-30

## 2019-07-22 NOTE — PROGRESS NOTES
[facility] - PRIMARY CARE SKIN    CC: Acne  SUBJECTIVE:   Lizzeth Alarcon is a(n) 29 year old female who presents to clinic today because of acne. She is currently taking 50 mg spironolactone. She has see some slight improvement with the number of inflammatory papules on the face.    Symptoms have been ongoing for: years. Acne relapsed shortly after finishing oral isotretinoin in the past (10 years ago).  The acne is primarily located on the: back, chest and face. Acne is more of an issue on the face then back and chest.  Acne generally presents as: closed comedone(s) and pimple(s).    Current treatment: Differin, Neutrogena cleansers, OCP.  Response to treatment: Acne is not well controlled.  Side effects noted: None.    Previous treatments include: A course of oral isotretinoin was taken approximately 10 years ago.  clindamycin 1% gel, Benazaclin, minocycline  She is on oral hormonal contraceptive levonorgestrel-ethinyl estradiol 0.1-0.02 mg.    Skin type: oily.  Family history of acne: YES.  Risk factors for acne: menstrual cycle.    Refer to electronic medical record (EMR) for past medical history and medications.    INTEGUMENTARY/SKIN: POSITIVE for acne  ROS: 14 point review of systems was negative except the symptoms listed above in the HPI.      OBJECTIVE:   GENERAL: healthy, alert and no distress.  SKIN: Quezada Skin Type - II.  Face, Neck and Trunk examined. The dermatoscope was used to help evaluate pigmented lesions.  Skin Pertinent Findings:  Face: Multiple scattered inflammatory papules. Some closed comedones. Cheeks, chin and forehead. Moderate scarring  Chest: Keloid on the mid-chest  4 cm X 3 cm  Back: Few scattered inflammatory papules.    Diagnostic Test Results:  none     ASSESSMENT:     Encounter Diagnosis   Name Primary?     Acne vulgaris      MDM: . Discussed pathophysiology of acne, treatment options, and expectations for treatment response. Consider repeat course of oral isotretinoin  .    PLAN:   Patient Instructions   Increase spironolactone to 100 mg at bedtime, two 50 mg tablets  Recheck in 3 months      TT: 20 minutes.  CT: 15 minutes.

## 2019-07-22 NOTE — LETTER
7/22/2019         RE: Lizzeth Alarcon  [address] [place] [address]        Dear Colleague,    Thank you for referring your patient, Lizzeth Alarcon, to the [facility] [place]. Please see a copy of my visit note below.    [facility] - PRIMARY CARE SKIN    CC: Acne  SUBJECTIVE:   Lizzeth Alarcon is a(n) 29 year old female who presents to clinic today because of acne. She is currently taking 50 mg spironolactone. She has see some slight improvement with the number of inflammatory papules on the face.    Symptoms have been ongoing for: years. Acne relapsed shortly after finishing oral isotretinoin in the past (10 years ago).  The acne is primarily located on the: back, chest and face. Acne is more of an issue on the face then back and chest.  Acne generally presents as: closed comedone(s) and pimple(s).    Current treatment: Differin, Neutrogena cleansers, OCP.  Response to treatment: Acne is not well controlled.  Side effects noted: None.    Previous treatments include: A course of oral isotretinoin was taken approximately 10 years ago.  clindamycin 1% gel, Benazaclin, minocycline  She is on oral hormonal contraceptive levonorgestrel-ethinyl estradiol 0.1-0.02 mg.    Skin type: oily.  Family history of acne: YES.  Risk factors for acne: menstrual cycle.    Refer to electronic medical record (EMR) for past medical history and medications.    INTEGUMENTARY/SKIN: POSITIVE for acne  ROS: 14 point review of systems was negative except the symptoms listed above in the HPI.      OBJECTIVE:   GENERAL: healthy, alert and no distress.  SKIN: Quezada Skin Type - II.  Face, Neck and Trunk examined. The dermatoscope was used to help evaluate pigmented lesions.  Skin Pertinent Findings:  Face: Multiple scattered inflammatory papules. Some closed comedones. Cheeks, chin and forehead. Moderate scarring  Chest: Keloid on the mid-chest  4 cm X 3 cm  Back: Few scattered inflammatory  papules.    Diagnostic Test Results:  none     ASSESSMENT:     Encounter Diagnosis   Name Primary?     Acne vulgaris      MDM: . Discussed pathophysiology of acne, treatment options, and expectations for treatment response. Consider repeat course of oral isotretinoin .    PLAN:   Patient Instructions   Increase spironolactone to 100 mg at bedtime, two 50 mg tablets  Recheck in 3 months      TT: 20 minutes.  CT: 15 minutes.          Again, thank you for allowing me to participate in the care of your patient.        Sincerely,        Shobha Guzman MD

## 2019-07-23 ENCOUNTER — TELEPHONE (OUTPATIENT)
Dept: FAMILY MEDICINE | Facility: CLINIC | Age: 29
End: 2019-07-23

## 2019-07-23 LAB
CREAT SERPL-MCNC: 0.74 MG/DL (ref 0.52–1.04)
GFR SERPL CREATININE-BSD FRML MDRD: >90 ML/MIN/{1.73_M2}
POTASSIUM SERPL-SCNC: 4.1 MMOL/L (ref 3.4–5.3)

## 2019-07-23 NOTE — TELEPHONE ENCOUNTER
----- Message from Shobha Guzman MD sent at 7/23/2019  3:14 PM CDT -----  Please call and let her know that her lab work is normal.    Thank you,   Shobha Guzman M.D.

## 2019-09-13 ENCOUNTER — TELEPHONE (OUTPATIENT)
Dept: TRANSPLANT | Facility: CLINIC | Age: 29
End: 2019-09-13

## 2019-09-13 NOTE — TELEPHONE ENCOUNTER
"Jeffreyuth BREEZE  [id number]      LIVING LIVER DONOR EVALUATION  Donor First Name Lizzeth Donor MRN    Donor Middle Name Sharon Completed 9/10/2019 9:08 AM   Donor Last Name Dorian Record Id [id number]    1990     BREEZE Screen PASSED     Intended Recipient  Recipient First Name Anthony Relationship Member of the same community   Recipient Last Name Estrellita Recipient Diagnosis    Recipient   Recipient Insurance Provider    Recipient MRN  Recipient Insurance Type    Recipient Height      Recipient Weight      Recipient's ABO      Donor Information  Age 29 Race    Ht 170 cm (5' 7'') Ethnicity Not /   Wt 63.5 kg (140 lbs) Preferred Language English   BMI 21.90 kg/m   Required No   Gender Female Blood Type O   Demographics  Home Address [address] [place] [address] # [phone]   [facility] Alternate #    Zip Code [address] Type    Country [place] Preferred Contact day Mon, Thur, Wed, Tue   Email [email or web address] Preferred Contact time 1:00 PM-4:00 PM   &&   Donor's Medical Information  Medical History Dysfunctional Uterine Bleeding   Headache (Non-Migraine)   other (\"Acne\")   other (\"Sleep aid 2 -3 times a week\") Medications Ethinyl Estradiol and Desogestrel   Ibuprofen   Melatonin Tablet   Spironolactone   Surgical History Refractive Surgery, NOS Allergies Minocycline (Tablets or Capsules) : other (Joint pain, fatigue)   Social History EtOH: Daily (1-2 drinks/day)   Illicit Drug Use: Denies   Tobacco: Denies Self-Reported Functional Status \"I am able to participate in strenuous sports such as swimming, singles tennis, football, basketball, or skiing\"   Family Medical History Cancer (denies)   Clots or Clotting Disease (denies)   Diabetes (denies)   Heart Disease (denies)   Hypertension (denies)   Inflammatory Bowel Disease (denies)   Liver Disease (denies)   Mental Illness (Mother, Father, Sibling) Exercise Frequency " Exercise (3 X per week)   Review of Organ Systems  Review of Systems Airway or Lungs: No   Blood Disorder: No   Cancer: No   Diabetes,Thyroid,Adrenal,Endocrine Disorder: No   Digestive or Liver: No   Female Health: Yes   Heart or Circulatory System: No   Immune Diseases: No   Kidneys and Bladder: No   Muscles,Bones,Joints: No   Neuro: No   Psych: No   &&   Donor's Social Information  Marital Status Single Level of Education Graduate or professional degree complete   Medical Insurance Status Has medical insurance Employment Status Full Time   Living Accommodation Owns own home/apartment Employer [employer]   Living Arrangement Alone Occupation    High Risk Behaviors Blood transfusion < 12 months. (NO)   Commercial sex < 12 months. (NO)   Illicit IV drug use < 5yrs. (NO)   Other high risk sexual contact < 12 months. (NO)     Reason for Donation  Referral Friend or Family of Tx Candidate Reason for Donation There is no greater ask than being able to save a life.   Permission to Disclose Inquiry  Patient Comments    Donor Motivation Ready to start evaluation with reservations     PCP Contact  PCP Name Stella Jones   PCP [facility] [place]   PCP Phone [phone]   Emergency Contact  First Name Mabel First Name Melody   Last Name Dorian Last Name Dorian   Phone # [phone] Phone # [phone]   Phone Type Mobile Phone Type Mobile   Relationship Mother Relationship Sibling   Office Use  Reviewed By    Reviewed 9/13/2019   Admin Folder Archive   Comments    Lost for Followup    Extended Comments    BREEZE ID [id number]   survey status completed   Activity History  Call  Task    Due Date 9/13/2019   Last Modified Date/Time 9/13/2019 10:17 AM   Comments

## 2019-09-13 NOTE — TELEPHONE ENCOUNTER
Is abo O. Phoebe's Dad is friend of Lizzeth's neighbor. Has 1 glass wine every day.Others testing for this recip.Will send info/forms.

## 2019-10-14 ENCOUNTER — OFFICE VISIT (OUTPATIENT)
Dept: FAMILY MEDICINE | Facility: CLINIC | Age: 29
End: 2019-10-14
Payer: COMMERCIAL

## 2019-10-14 VITALS
SYSTOLIC BLOOD PRESSURE: 122 MMHG | BODY MASS INDEX: 23.39 KG/M2 | TEMPERATURE: 99.8 F | DIASTOLIC BLOOD PRESSURE: 81 MMHG | HEART RATE: 71 BPM | WEIGHT: 149 LBS | OXYGEN SATURATION: 97 % | HEIGHT: 67 IN

## 2019-10-14 DIAGNOSIS — Z00.00 ANNUAL VISIT FOR GENERAL ADULT MEDICAL EXAMINATION WITHOUT ABNORMAL FINDINGS: Primary | ICD-10-CM

## 2019-10-14 DIAGNOSIS — Z13.220 SCREENING FOR HYPERLIPIDEMIA: ICD-10-CM

## 2019-10-14 DIAGNOSIS — Z13.1 SCREENING FOR DIABETES MELLITUS: ICD-10-CM

## 2019-10-14 DIAGNOSIS — L70.0 ACNE VULGARIS: ICD-10-CM

## 2019-10-14 DIAGNOSIS — L91.0 KELOID SCAR: ICD-10-CM

## 2019-10-14 LAB
CHOLEST SERPL-MCNC: 211 MG/DL
ERYTHROCYTE [DISTWIDTH] IN BLOOD BY AUTOMATED COUNT: 13.2 % (ref 10–15)
GLUCOSE SERPL-MCNC: 79 MG/DL (ref 70–99)
HCT VFR BLD AUTO: 42.1 % (ref 35–47)
HDLC SERPL-MCNC: 68 MG/DL
HGB BLD-MCNC: 14.3 G/DL (ref 11.7–15.7)
LDLC SERPL CALC-MCNC: 122 MG/DL
MCH RBC QN AUTO: 32 PG (ref 26.5–33)
MCHC RBC AUTO-ENTMCNC: 34 G/DL (ref 31.5–36.5)
MCV RBC AUTO: 94 FL (ref 78–100)
NONHDLC SERPL-MCNC: 143 MG/DL
PLATELET # BLD AUTO: 235 10E9/L (ref 150–450)
RBC # BLD AUTO: 4.47 10E12/L (ref 3.8–5.2)
TRIGL SERPL-MCNC: 103 MG/DL
WBC # BLD AUTO: 8.4 10E9/L (ref 4–11)

## 2019-10-14 PROCEDURE — 85027 COMPLETE CBC AUTOMATED: CPT | Performed by: INTERNAL MEDICINE

## 2019-10-14 PROCEDURE — 36415 COLL VENOUS BLD VENIPUNCTURE: CPT | Performed by: INTERNAL MEDICINE

## 2019-10-14 PROCEDURE — 82947 ASSAY GLUCOSE BLOOD QUANT: CPT | Performed by: INTERNAL MEDICINE

## 2019-10-14 PROCEDURE — 99395 PREV VISIT EST AGE 18-39: CPT | Performed by: INTERNAL MEDICINE

## 2019-10-14 PROCEDURE — 80061 LIPID PANEL: CPT | Performed by: INTERNAL MEDICINE

## 2019-10-14 ASSESSMENT — MIFFLIN-ST. JEOR: SCORE: 1433.49

## 2019-10-14 NOTE — PROGRESS NOTES
SUBJECTIVE:   CC: Lizzeth Alarcon is an 29 year old woman who presents for preventive health visit.     Healthy Habits:     Getting at least 3 servings of Calcium per day:  Yes    Bi-annual eye exam:  Yes    Dental care twice a year:  Yes    Sleep apnea or symptoms of sleep apnea:  None    Diet:  Regular (no restrictions)    Frequency of exercise:  4-5 days/week    Duration of exercise:  30-45 minutes    Taking medications regularly:  Yes    Medication side effects:  None    PHQ-2 Total Score: 0    Additional concerns today:  No      Taking her oral contraceptive continually.   Spironolactone 100 mg daily for acne. Starting to help.             Today's PHQ-2 Score:   PHQ-2 ( 1999 [employer]) 10/13/2019   Q1: Little interest or pleasure in doing things 0   Q2: Feeling down, depressed or hopeless 0   PHQ-2 Score 0   Q1: Little interest or pleasure in doing things Not at all   Q2: Feeling down, depressed or hopeless Not at all   PHQ-2 Score 0       Abuse: Current or Past(Physical, Sexual or Emotional)- No  Do you feel safe in your environment? Yes    Social History     Tobacco Use     Smoking status: Never Smoker     Smokeless tobacco: Never Used     Tobacco comment: no second hand smoke   Substance Use Topics     Alcohol use: Yes     Comment: 3-4/week         Alcohol Use 10/13/2019   Prescreen: >3 drinks/day or >7 drinks/week? No   Prescreen: >3 drinks/day or >7 drinks/week? -       Reviewed orders with patient.  Reviewed health maintenance and updated orders accordingly - Yes  Lab work is in process    Mammogram not appropriate for this patient based on age.    Pertinent mammograms are reviewed under the imaging tab.  History of abnormal Pap smear: NO - age 21-29 PAP every 3 years recommended  PAP / HPV 8/18/2017 6/26/2014 1/15/2009   PAP NIL NIL NIL     Reviewed and updated as needed this visit by clinical staff         Reviewed and updated as needed this visit by Provider            Review of  "Systems  CONSTITUTIONAL: NEGATIVE for fever, chills, change in weight  INTEGUMENTARU/SKIN: NEGATIVE for worrisome rashes, moles or lesions  EYES: NEGATIVE for vision changes or irritation  ENT: NEGATIVE for ear, mouth and throat problems  RESP: NEGATIVE for significant cough or SOB  BREAST: NEGATIVE for masses, tenderness or discharge  CV: NEGATIVE for chest pain, palpitations or peripheral edema  GI: NEGATIVE for nausea, abdominal pain, heartburn, or change in bowel habits  : NEGATIVE for unusual urinary or vaginal symptoms. Periods are regular.  MUSCULOSKELETAL: NEGATIVE for significant arthralgias or myalgia  NEURO: NEGATIVE for weakness, dizziness or paresthesias  PSYCHIATRIC: NEGATIVE for changes in mood or affect    OBJECTIVE:   /81   Pulse 71   Temp 99.8  F (37.7  C) (Tympanic)   Ht 1.702 m (5' 7\")   Wt 67.6 kg (149 lb)   SpO2 97%   BMI 23.34 kg/m    Physical Exam  GENERAL: healthy, alert and no distress  EYES: Eyes grossly normal to inspection, PERRL and conjunctivae and sclerae normal  HENT: ear canals and TM's normal, nose and mouth without ulcers or lesions  NECK: no adenopathy, no asymmetry, masses, or scars and thyroid normal to palpation  RESP: lungs clear to auscultation - no rales, rhonchi or wheezes  BREAST: normal without masses, tenderness or nipple discharge and no palpable axillary masses or adenopathy  CV: regular rate and rhythm, normal S1 S2, no S3 or S4, no murmur, click or rub, no peripheral edema and peripheral pulses strong  ABDOMEN: soft, nontender, no hepatosplenomegaly, no masses and bowel sounds normal  MS: no gross musculoskeletal defects noted, no edema  SKIN:  2cm hyperpigmented nevus, flat, on left upper abdomen.   NEURO: Normal strength and tone, mentation intact and speech normal  PSYCH: mentation appears normal, affect normal/bright    Diagnostic Test Results:  Labs reviewed in Epic    ASSESSMENT/PLAN:   1. Annual visit for general adult medical examination " "without abnormal findings  Continue pap smear screening every 3 years. Due in 2020. Screening fasting labs today.   She does have a fairly large nevus on her left upper abdomen. A few scattered hyperpigmented areas but reportedly unchanged in size or character. She will monitor for changes and notify us.  - CBC with platelets    2. Screening for hyperlipidemia  - Lipid panel reflex to direct LDL Fasting    3. Screening for diabetes mellitus  - Glucose    4. Acne vulgaris  Continue Spironolactone.    5. Keloid scar      COUNSELING:  Reviewed preventive health counseling, as reflected in patient instructions       Regular exercise       Healthy diet/nutrition       Contraception    Estimated body mass index is 25.37 kg/m  as calculated from the following:    Height as of 8/21/18: 1.702 m (5' 7\").    Weight as of 8/21/18: 73.5 kg (162 lb).         reports that she has never smoked. She has never used smokeless tobacco.      Counseling Resources:  ATP IV Guidelines  Pooled Cohorts Equation Calculator  Breast Cancer Risk Calculator  FRAX Risk Assessment  ICSI Preventive Guidelines  Dietary Guidelines for Americans, 2010  USDA's MyPlate  ASA Prophylaxis  Lung CA Screening    Stella Jones MD  [facility]  "

## 2019-10-14 NOTE — PROGRESS NOTES
Answers for HPI/ROS submitted by the patient on 10/13/2019   Annual Exam:  Frequency of exercise:: 4-5 days/week  Getting at least 3 servings of Calcium per day:: Yes  Diet:: Regular (no restrictions)  Taking medications regularly:: Yes  Medication side effects:: None  Bi-annual eye exam:: Yes  Dental care twice a year:: Yes  Sleep apnea or symptoms of sleep apnea:: None  Additional concerns today:: No  Duration of exercise:: 30-45 minutes

## 2019-10-29 NOTE — PROGRESS NOTES
[facility] - PRIMARY CARE SKIN    CC: Acne  SUBJECTIVE:   Lizzeth Alarcon is a(n) 29 year old female who presents to clinic today because of acne. She is currently taking 100 mg spironolactone.               Current treatment : spironolactone 50 mg 2 tabs po q days   Response to treatment :  Has seen less acne lesions     Previous treatments include: A course of oral isotretinoin was taken approximately 10 years ago.  clindamycin 1% gel, Benazaclin, minocycline  She is on oral hormonal contraceptive levonorgestrel-ethinyl estradiol 0.1-0.02 mg.        Refer to electronic medical record (EMR) for past medical history and medications.    INTEGUMENTARY/SKIN: POSITIVE for acne  ROS: 14 point review of systems was negative except the symptoms listed above in the HPI.      OBJECTIVE:   GENERAL: healthy, alert and no distress.  SKIN: [place] Skin Type - II.  Face, Neck and Trunk examined. The dermatoscope was used to help evaluate pigmented lesions.  Skin Pertinent Findings:  Face: single inflammatory papules . Cheeks, chin and forehead. Moderate scarring  Chest: Keloid on the mid-chest  4 cm X 3 cm  Back: Few scattered inflammatory papules.    Diagnostic Test Results:  none     ASSESSMENT:     Encounter Diagnosis   Name Primary?     Acne vulgaris      MDM: . Discussed pathophysiology of acne, treatment options, and expectations for treatment response. Consider repeat course of oral isotretinoin .    PLAN:   Patient Instructions   Spironolactone 50 mg 3 tabs orally per day  Recheck in one year if adequate control of the acne    TT: 20 minutes.  CT: 15 minutes.

## 2019-10-30 ENCOUNTER — OFFICE VISIT (OUTPATIENT)
Dept: FAMILY MEDICINE | Facility: CLINIC | Age: 29
End: 2019-10-30
Payer: COMMERCIAL

## 2019-10-30 VITALS
DIASTOLIC BLOOD PRESSURE: 80 MMHG | HEIGHT: 67 IN | WEIGHT: 150 LBS | HEART RATE: 86 BPM | SYSTOLIC BLOOD PRESSURE: 124 MMHG | TEMPERATURE: 99 F | BODY MASS INDEX: 23.54 KG/M2 | OXYGEN SATURATION: 100 %

## 2019-10-30 VITALS — DIASTOLIC BLOOD PRESSURE: 80 MMHG | SYSTOLIC BLOOD PRESSURE: 124 MMHG | BODY MASS INDEX: 23.54 KG/M2 | WEIGHT: 150 LBS

## 2019-10-30 DIAGNOSIS — L70.0 ACNE VULGARIS: ICD-10-CM

## 2019-10-30 DIAGNOSIS — S09.90XA TRAUMATIC INJURY OF HEAD, INITIAL ENCOUNTER: Primary | ICD-10-CM

## 2019-10-30 PROCEDURE — 99213 OFFICE O/P EST LOW 20 MIN: CPT | Performed by: FAMILY MEDICINE

## 2019-10-30 PROCEDURE — 99213 OFFICE O/P EST LOW 20 MIN: CPT | Performed by: INTERNAL MEDICINE

## 2019-10-30 RX ORDER — SPIRONOLACTONE 50 MG/1
150 TABLET, FILM COATED ORAL DAILY
Qty: 270 TABLET | Refills: 3 | Status: SHIPPED | OUTPATIENT
Start: 2019-10-30 | End: 2020-01-20

## 2019-10-30 ASSESSMENT — MIFFLIN-ST. JEOR: SCORE: 1436.9

## 2019-10-30 NOTE — PROGRESS NOTES
"Subjective     Lizzeth Alarcon is a 29 year old female who presents to clinic today for the following health issues:    HPI   Joint Pain    Onset: Concussion, hit the back of her head on a wall.     Description:   Location: Back head   Character: Dull ache    Intensity: mild    Progression of Symptoms: better    Accompanying Signs & Symptoms:  Other symptoms: none    History:   Previous similar pain: no       Precipitating factors:   Trauma or overuse: no     Alleviating factors:  Improved by: Just resting     Therapies Tried and outcome:     Lizzeth hit the back of her head on a wall 4 days ago.  She is wondering whether she might have developed a concussion and what to look out for.  It sounds like she was also intoxicated at the time that she hit her head.  She had some symptoms of a hangover the next day or two.  Today she is feeling much better.  She has mainly noticed a difficulty with her vision, hard to describe, just feels a little off.  She denies headache, vomiting, irritability or changes in mood, changes in concentration, difficulty sleeping, no focal numbness or weakness.    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  She left work early on Monday (today is Wednesday) and took today off.                Reviewed and updated as needed this visit by Provider         Review of Systems   Neuro, psych reviewed,  otherwise negative unless noted above.        Objective    /80 (BP Location: Left arm, Patient Position: Sitting, Cuff Size: Adult Regular)   Pulse 86   Temp 99  F (37.2  C) (Tympanic)   Ht 1.7 m (5' 6.93\")   Wt 68 kg (150 lb)   SpO2 100%   BMI 23.54 kg/m    Body mass index is 23.54 kg/m .  Physical Exam   GENERAL: healthy, alert and no distress  NEURO: Normal strength and tone, mentation intact and speech normal. PERCLAUDIA CARRIONMI.   PSYCH: mentation appears normal, affect normal/bright            Assessment & Plan     1. Traumatic injury of head, initial encounter  Possible very " mild concussion with just some accomodation vision symptoms.  At this point recommended taking it easy, she plans to work from home the rest of the week.  If the vision symptoms become more bothersome or are not going away, there is specific therapy that can be done.         Return in about 1 week (around 11/6/2019) for If no improvement.    Kristine Salmeron MD  [facility] [place]

## 2019-10-30 NOTE — LETTER
10/30/2019         RE: Lizzeth Alarcon  [address] [place] [address]        Dear Colleague,    Thank you for referring your patient, Lizzeth Alarcon, to the [facility]. Please see a copy of my visit note below.    [facility] - PRIMARY CARE SKIN    CC: Acne  SUBJECTIVE:   Lizzeth Alarcon is a(n) 29 year old female who presents to clinic today because of acne. She is currently taking 100 mg spironolactone.               Current treatment : spironolactone 50 mg 2 tabs po q days   Response to treatment :  Has seen less acne lesions     Previous treatments include: A course of oral isotretinoin was taken approximately 10 years ago.  clindamycin 1% gel, Benazaclin, minocycline  She is on oral hormonal contraceptive levonorgestrel-ethinyl estradiol 0.1-0.02 mg.        Refer to electronic medical record (EMR) for past medical history and medications.    INTEGUMENTARY/SKIN: POSITIVE for acne  ROS: 14 point review of systems was negative except the symptoms listed above in the HPI.      OBJECTIVE:   GENERAL: healthy, alert and no distress.  SKIN: [place] Skin Type - II.  Face, Neck and Trunk examined. The dermatoscope was used to help evaluate pigmented lesions.  Skin Pertinent Findings:  Face: single inflammatory papules . Cheeks, chin and forehead. Moderate scarring  Chest: Keloid on the mid-chest  4 cm X 3 cm  Back: Few scattered inflammatory papules.    Diagnostic Test Results:  none     ASSESSMENT:     Encounter Diagnosis   Name Primary?     Acne vulgaris      MDM: . Discussed pathophysiology of acne, treatment options, and expectations for treatment response. Consider repeat course of oral isotretinoin .    PLAN:   Patient Instructions   Spironolactone 50 mg 3 tabs orally per day  Recheck in one year if adequate control of the acne    TT: 20 minutes.  CT: 15 minutes.          Again, thank you for allowing me to participate in the care of your patient.         Sincerely,        Shobha Guzman MD

## 2019-11-27 DIAGNOSIS — L70.0 ACNE VULGARIS: ICD-10-CM

## 2019-11-27 RX ORDER — SPIRONOLACTONE 50 MG/1
TABLET, FILM COATED ORAL
Qty: 90 TABLET | Refills: 1 | OUTPATIENT
Start: 2019-11-27

## 2019-11-27 NOTE — TELEPHONE ENCOUNTER
"    Last Written Prescription Date:  10/30/19  Last Fill Quantity: 270,  # refills: 3   Last office visit: 10/30/2019 with prescribing provider:     Future Office Visit:    Requested Prescriptions   Pending Prescriptions Disp Refills     spironolactone (ALDACTONE) 50 MG tablet [Pharmacy Med Name: SPIRONOLACTONE 50 MG TABLET] 90 tablet 1     Sig: TAKE 1 TABLET BY MOUTH EVERY DAY       Diuretics (Including Combos) Protocol Failed - 11/27/2019  5:48 AM        Failed - Normal serum sodium on file in past 12 months     No lab results found.           Passed - Blood pressure under 140/90 in past 12 months     BP Readings from Last 3 Encounters:   10/30/19 124/80   10/30/19 124/80   10/14/19 122/81                 Passed - Recent (12 mo) or future (30 days) visit within the authorizing provider's specialty     Patient has had an office visit with the authorizing provider or a provider within the authorizing providers department within the previous 12 mos or has a future within next 30 days. See \"Patient Info\" tab in inbasket, or \"Choose Columns\" in Meds & Orders section of the refill encounter.              Passed - Medication is active on med list        Passed - Patient is age 18 or older        Passed - No active pregancy on record        Passed - Normal serum creatinine on file in past 12 months     Recent Labs   Lab Test 07/22/19  1619   CR 0.74              Passed - Normal serum potassium on file in past 12 months     Recent Labs   Lab Test 07/22/19  1619   POTASSIUM 4.1                    Passed - No positive pregnancy test in past 12 months          "

## 2020-01-19 DIAGNOSIS — L70.0 ACNE VULGARIS: ICD-10-CM

## 2020-01-20 RX ORDER — SPIRONOLACTONE 50 MG/1
TABLET, FILM COATED ORAL
Qty: 180 TABLET | Refills: 1 | Status: SHIPPED | OUTPATIENT
Start: 2020-01-20 | End: 2021-02-10

## 2020-01-20 NOTE — TELEPHONE ENCOUNTER
"Requested Prescriptions   Pending Prescriptions Disp Refills     spironolactone (ALDACTONE) 50 MG tablet [Pharmacy Med Name: SPIRONOLACTONE 50 MG TABLET] 180 tablet 1     Sig: TAKE 2 TABLETS (100 MG) BY MOUTH DAILY       Diuretics (Including Combos) Protocol Failed - 1/19/2020  1:06 AM        Failed - Normal serum sodium on file in past 12 months     No lab results found.           Passed - Blood pressure under 140/90 in past 12 months     BP Readings from Last 3 Encounters:   10/30/19 124/80   10/30/19 124/80   10/14/19 122/81                 Passed - Recent (12 mo) or future (30 days) visit within the authorizing provider's specialty     Patient has had an office visit with the authorizing provider or a provider within the authorizing providers department within the previous 12 mos or has a future within next 30 days. See \"Patient Info\" tab in inbasket, or \"Choose Columns\" in Meds & Orders section of the refill encounter.              Passed - Medication is active on med list        Passed - Patient is age 18 or older        Passed - No active pregancy on record        Passed - Normal serum creatinine on file in past 12 months     Recent Labs   Lab Test 07/22/19  1619   CR 0.74              Passed - Normal serum potassium on file in past 12 months     Recent Labs   Lab Test 07/22/19  1619   POTASSIUM 4.1                    Passed - No positive pregnancy test in past 12 months        spironolactone (ALDACTONE) 50 MG tablet 270 tablet 3 10/30/2019        Last Written Prescription Date:  10/30/2019  Last Fill Quantity: 270,  # refills: 3   Last office visit: 10/30/2019 with prescribing provider:  Dr. Guzman   Future Office Visit:  Un known     "

## 2020-05-12 ENCOUNTER — E-VISIT (OUTPATIENT)
Dept: FAMILY MEDICINE | Facility: CLINIC | Age: 30
End: 2020-05-12
Payer: COMMERCIAL

## 2020-05-12 ENCOUNTER — TELEPHONE (OUTPATIENT)
Dept: FAMILY MEDICINE | Facility: CLINIC | Age: 30
End: 2020-05-12

## 2020-05-12 DIAGNOSIS — G43.009 MIGRAINE WITHOUT AURA AND WITHOUT STATUS MIGRAINOSUS, NOT INTRACTABLE: Primary | ICD-10-CM

## 2020-05-13 ENCOUNTER — VIRTUAL VISIT (OUTPATIENT)
Dept: FAMILY MEDICINE | Facility: CLINIC | Age: 30
End: 2020-05-13
Payer: COMMERCIAL

## 2020-05-13 DIAGNOSIS — G43.009 MIGRAINE WITHOUT AURA AND WITHOUT STATUS MIGRAINOSUS, NOT INTRACTABLE: Primary | ICD-10-CM

## 2020-05-13 PROCEDURE — 99214 OFFICE O/P EST MOD 30 MIN: CPT | Mod: 95 | Performed by: INTERNAL MEDICINE

## 2020-05-13 RX ORDER — SUMATRIPTAN 25 MG/1
25 TABLET, FILM COATED ORAL
Qty: 30 TABLET | Refills: 0 | Status: SHIPPED | OUTPATIENT
Start: 2020-05-13 | End: 2021-02-10

## 2020-05-13 NOTE — PROGRESS NOTES
"Lizzeth Alarcon is a 30 year old female who is being evaluated via a billable video visit.      The patient has been notified of following:     \"This video visit will be conducted via a call between you and your physician/provider. We have found that certain health care needs can be provided without the need for an in-person physical exam.  This service lets us provide the care you need with a video conversation.  If a prescription is necessary we can send it directly to your pharmacy.  If lab work is needed we can place an order for that and you can then stop by our lab to have the test done at a later time.    Video visits are billed at different rates depending on your insurance coverage.  Please reach out to your insurance provider with any questions.    If during the course of the call the physician/provider feels a video visit is not appropriate, you will not be charged for this service.\"    Patient has given verbal consent for Video visit? Yes    How would you like to obtain your AVS? aDryhar    Patient would like the video invitation sent by: Send to e-mail at: [email or web address]    Will anyone else be joining your video visit? No    Subjective     Lizzeth Alarcon is a 30 year old female who presents today via video visit for the following health issues:    HPI  Headache  Onset: Monday am     Description:   Location: unilateral in the both frontal area and bilateral sides of back of head   Character: dull pain, once in awhile throbbing   Frequency:  The last one she had this bad was 4 weeks ago   Duration:  36 hours    Intensity: moderate, severe    Progression of Symptoms:  constant    Accompanying Signs & Symptoms:  Stiff neck: YES  Neck or upper back pain: no  Fever: no  Sinus pressure: no  Nausea or vomiting: YES- last month but not this time   Dizziness: YES  Numbness: no  Weakness: no  Visual changes: no    History:   Head trauma: YES- last fall   Family history of migraines: no  Previous tests " for headaches: no  Neurologist evaluations: no  Able to do daily activities: no  Wake with a headaches: no  Do headaches wake you up: no  Daily pain medication use: no  Work/school stressors/changes: YES- new routines due to Covid but is relatively ok with it     Precipitating factors:   Does light make it worse: YES  Does sound make it worse: no    Alleviating factors:  Does sleep help: YES    Therapies Tried and outcome: Tylenol    Woke up Monday morning with severe pain. She had a lot of pain with bright light (was unable to send any emails or look at her computer). She has been working remotely for several weeks. The pain went away by noon the next day and by that evening she felt totally back to normal. She was taking tylenol every 6 hours. She also had nausea with this headache but no vomiting.         Video Start Time: 1:20 PM      Patient Active Problem List   Diagnosis     Acne     Contraceptive management     Keloid scar     Contraception     BMI > 25     Past Surgical History:   Procedure Laterality Date     NO HISTORY OF SURGERY         Social History     Tobacco Use     Smoking status: Never Smoker     Smokeless tobacco: Never Used     Tobacco comment: no second hand smoke   Substance Use Topics     Alcohol use: Yes     Comment: 3-4/week     Family History   Problem Relation Age of Onset     Thyroid Disease Mother      Respiratory Father         Asthma     Respiratory Brother         Exercised induced Asthma     Colon Cancer No family hx of      Breast Cancer No family hx of      Diabetes No family hx of      Myocardial Infarction No family hx of            Reviewed and updated as needed this visit by Provider         Review of Systems   Constitutional, HEENT, cardiovascular, pulmonary, GI, , musculoskeletal, neuro, skin, endocrine and psych systems are negative, except as otherwise noted.      Objective    There were no vitals taken for this visit.  Estimated body mass index is 23.54 kg/m  as  "calculated from the following:    Height as of 10/30/19: 1.7 m (5' 6.93\").    Weight as of 10/30/19: 68 kg (150 lb).  Physical Exam     GENERAL: Healthy, alert and no distress  EYES: Eyes grossly normal to inspection.  No discharge or erythema, or obvious scleral/conjunctival abnormalities.  RESP: No audible wheeze, cough, or visible cyanosis.  No visible retractions or increased work of breathing.    SKIN: Visible skin clear. No significant rash, abnormal pigmentation or lesions.  NEURO: Cranial nerves grossly intact.  Mentation and speech appropriate for age.  PSYCH: Mentation appears normal, affect normal/bright, judgement and insight intact, normal speech and appearance well-groomed.      Diagnostic Test Results:  Labs reviewed in Epic        Assessment & Plan     1. Migraine without aura and without status migrainosus, not intractable  Lizzeth's symptoms do fit some characteristics of migraines including the severity of it, sudden onset, photophobia, and nausea.  Although most migraines are one-sided they are not all.  She had a similar headache about a month ago.  She is unsure if it correlated with the same time in her menstrual cycle.  She does take a birth control pill but has been on this pill for a while.  She does not have any red flag symptoms that make me think we need to obtain imaging at this time.  However, the new onset of migraines without a family history does make daily spots and consider it.  For now I am asking that she keep a headache diary to see if we can correlate a trend.  I am also sending in a prescription for Imitrex.  I have told her to let me know if her migraine headaches become more frequent, if she develops regular vomiting or dry heaving, blurry vision, dizziness or new symptoms I would like to know.  At that time I would likely pursue head imaging.    - SUMAtriptan (IMITREX) 25 MG tablet; Take 1 tablet (25 mg) by mouth at onset of headache for migraine May repeat in 2 hours. " Max 4 tablets/24 hours.  Dispense: 30 tablet; Refill: 0       Return in about 3 months (around 8/13/2020).    Stella Jones MD  [facility]      Video-Visit Details    Type of service:  Video Visit    Video End Time:1:38 PM    Originating Location (pt. Location): Home    Distant Location (provider location):  [facility] [place]     Platform used for Video Visit: [employer]    No follow-ups on file.       Stella Jones MD

## 2020-10-27 ENCOUNTER — VIRTUAL VISIT (OUTPATIENT)
Dept: FAMILY MEDICINE | Facility: OTHER | Age: 30
End: 2020-10-27

## 2020-10-27 DIAGNOSIS — Z20.822 SUSPECTED COVID-19 VIRUS INFECTION: ICD-10-CM

## 2020-10-27 DIAGNOSIS — Z20.822 SUSPECTED COVID-19 VIRUS INFECTION: Primary | ICD-10-CM

## 2020-10-27 PROCEDURE — U0003 INFECTIOUS AGENT DETECTION BY NUCLEIC ACID (DNA OR RNA); SEVERE ACUTE RESPIRATORY SYNDROME CORONAVIRUS 2 (SARS-COV-2) (CORONAVIRUS DISEASE [COVID-19]), AMPLIFIED PROBE TECHNIQUE, MAKING USE OF HIGH THROUGHPUT TECHNOLOGIES AS DESCRIBED BY CMS-2020-01-R: HCPCS | Performed by: FAMILY MEDICINE

## 2020-10-27 NOTE — PROGRESS NOTES
"Date: 10/27/2020 09:43:50  Clinician: Yves Laura  Clinician NPI: [id number]  Patient: Lizzeth Alarcon  Patient : 1990  Patient Address: [address], [place] [address]  Patient Phone: [phone]  Visit Protocol: URI  Patient Summary:  Lizzeth is a 30 year old ( : 1990 ) female who initiated a [facility] Visit for COVID-19 (Coronavirus) evaluation and screening. When asked the question \"Please sign me up to receive news, health information and promotions. \", Lizzeth responded \"No\".    Lizzeth states her symptoms started 1-2 days ago.   Her symptoms consist of ear pain, a headache, rhinitis, myalgia, malaise, and a sore throat. Lizzeth also feels feverish.   Symptom details     Nasal secretions: The color of her mucus is clear.    Sore throat: Lizzeth reports having mild throat pain (1-3 on a 10 point pain scale), does not have exudate on her tonsils, and can swallow liquids. The lymph nodes in her neck are not enlarged. A rash has not appeared on the skin since the sore throat started.     Temperature: Her current temperature is 98.5 degrees Fahrenheit.     Headache: She states the headache is mild (1-3 on a 10 point pain scale).      Lizzeth denies having wheezing, enlarged lymph nodes, cough, nasal congestion, anosmia, vomiting, nausea, facial pain or pressure, chills, teeth pain, ageusia, and diarrhea. She also denies having a sinus infection within the past year, taking antibiotic medication in the past month, and having recent facial or sinus surgery in the past 60 days. She is not experiencing dyspnea.   Precipitating events  Within the past week, Lizzeth has not been exposed to someone with strep throat. She has not recently been exposed to someone with influenza. Lizzeth has not been in close contact with any high risk individuals.   Pertinent COVID-19 (Coronavirus) information  In the past 14 days, Lizzeth has not worked in a congregate living setting.   She does " not work or volunteer as healthcare worker or a  and does not work or volunteer in a healthcare facility.   Lizzeth also has not lived in a congregate living setting in the past 14 days. She does not live with a healthcare worker.   Lizzeth has not had a close contact with a laboratory-confirmed COVID-19 patient within 14 days of symptom onset.   Since December 2019, Lizzeth and has not had upper respiratory infection or influenza-like illness. Has not been diagnosed with lab-confirmed COVID-19 test   Pertinent medical history  Lizzeth does not get yeast infections when she takes antibiotics.   Lizzeth does not need a return to work/school note.   Weight: 145 lbs   Lizzeth does not smoke or use smokeless tobacco.   She denies pregnancy and denies breastfeeding. Her last period was over a month ago.   Weight: 145 lbs    MEDICATIONS: Apri oral, spironolactone oral, ALLERGIES: minocycline  Clinician Response:  Dear Lizzeth,   Your symptoms show that you may have coronavirus (COVID-19). This illness can cause fever, cough and trouble breathing. Many people get a mild case and get better on their own. Some people can get very sick.  What should I do?  We would like to test you for this virus.   1. Please call [phone] to schedule your visit. Explain that you were referred by [facility] to have a COVID-19 test. Be ready to share your [facility] visit ID number.  Please note that if you are assessed for Covid-19 testing and receive an [facility], that the scheduling of your Covid test at [facility] may be delayed by three or four days or more due to limited availability for testing. Additional options for testing can be found on the [place] Covid-19 Response website. https://[place].gov/covid19/    The following will serve as your written order for this COVID Test, ordered by me, for the indication of suspected COVID [Z20.828]: The test will be ordered in [employer], our electronic health  "record, after you are scheduled. It will show as ordered and authorized by Zachary Escalona MD.  Order: COVID-19 (Coronavirus) PCR [facility]IC [facility].   2. When it's time for your COVID test:  Stay at least 6 feet away from others. (If someone will drive you to your test, stay in the backseat, as far away from the  as you can.)   Cover your mouth and nose with a mask, tissue or washcloth.  Go straight to the testing site. Don't make any stops on the way there or back.      3.Starting now: Stay home and away from others (self-isolate) until:   You've had no fever---and no medicine that reduces fever---for one full day (24 hours). And...   Your other symptoms have gotten better. For example, your cough or breathing has improved. And...   At least 10 days have passed since your symptoms started.       During this time, don't leave the house except for testing or medical care.   Stay in your own room, even for meals. Use your own bathroom if you can.   Stay away from others in your home. No hugging, kissing or shaking hands. No visitors.  Don't go to work, school or anywhere else.    Clean \"high touch\" surfaces often (doorknobs, counters, handles, etc.). Use a household cleaning spray or wipes. You'll find a full list of  on the EPA website: www.epa.gov/pesticide-registration/list-n-disinfectants-use-against-sars-cov-2.   Cover your mouth and nose with a mask, tissue or washcloth to avoid spreading germs.  Wash your hands and face often. Use soap and water.  Caregivers in these groups are at risk for severe illness due to COVID-19:  o People 65 years and older  o People who live in a nursing home or long-term care facility  o People with chronic disease (lung, heart, cancer, diabetes, kidney, liver, immunologic)  o People who have a weakened immune system, including those who:   Are in cancer treatment  Take medicine that weakens the immune system, such as corticosteroids  Had a bone marrow or " organ transplant  Have an immune deficiency  Have poorly controlled HIV or AIDS  Are obese (body mass index of 40 or higher)  Smoke regularly   o Caregivers should wear gloves while washing dishes, handling laundry and cleaning bedrooms and bathrooms.  o Use caution when washing and drying laundry: Don't shake dirty laundry, and use the warmest water setting that you can.  o For more tips, go to www.cdc.gov/coronavirus/2019-ncov/downloads/10Things.pdf.    4.Sign up for [employer]. We know it's scary to hear that you might have COVID-19. We want to track your symptoms to make sure you're okay over the next 2 weeks. Please look for an email from [employer]---this is a free, online program that we'll use to keep in touch. To sign up, follow the link in the email. Learn more at [email or web address]  How can I take care of myself?   Get lots of rest. Drink extra fluids (unless a doctor has told you not to).   Take Tylenol (acetaminophen) for fever or pain. If you have liver or kidney problems, ask your family doctor if it's okay to take Tylenol.   Adults can take either:    650 mg (two 325 mg pills) every 4 to 6 hours, or...   1,000 mg (two 500 mg pills) every 8 hours as needed.    Note: Don't take more than 3,000 mg in one day. Acetaminophen is found in many medicines (both prescribed and over-the-counter medicines). Read all labels to be sure you don't take too much.   For children, check the Tylenol bottle for the right dose. The dose is based on the child's age or weight.    If you have other health problems (like cancer, heart failure, an organ transplant or severe kidney disease): Call your specialty clinic if you don't feel better in the next 2 days.       Know when to call 911. Emergency warning signs include:    Trouble breathing or shortness of breath Pain or pressure in the chest that doesn't go away Feeling confused like you haven't felt before, or not being able to wake up Bluish-colored  lips or face.  Where can I get more information?   [facility] -- About COVID-19: [email or web address]   CDC -- What to Do If You're Sick: www.cdc.gov/coronavirus/2019-ncov/about/steps-when-sick.html   CDC -- Ending Home Isolation: www.cdc.gov/coronavirus/2019-ncov/hcp/disposition-in-home-patients.html   [facility] -- Caring for Someone: www.cdc.gov/coronavirus/2019-ncov/if-you-are-sick/care-for-someone.html   [facility] -- Interim Guidance for Hospital Discharge to Home: www.[facility].[place]/diseases/coronavirus/hcp/hospdischarge.pdf   [facility] clinical trials (COVID-19 research studies): clinicalaffairs.[employer].[facility]/[employer]-clinical-trials    Below are the COVID-19 hotlines at the [place] Department of Health ([facility]). Interpreters are available.    For health questions: Call 440-486-0555 or 1-825.254.9048 (7 a.m. to 7 p.m.) For questions about schools and childcare: Call 283-206-3999 or 1-763.552.5156 (7 a.m. to 7 p.m.)    Diagnosis: Contact with and (suspected) exposure to other viral communicable diseases  Diagnosis ICD: Z20.828

## 2020-10-28 LAB
SARS-COV-2 RNA SPEC QL NAA+PROBE: NOT DETECTED
SPECIMEN SOURCE: NORMAL

## 2020-11-22 ENCOUNTER — HEALTH MAINTENANCE LETTER (OUTPATIENT)
Age: 30
End: 2020-11-22

## 2021-01-25 ENCOUNTER — MYC MEDICAL ADVICE (OUTPATIENT)
Dept: FAMILY MEDICINE | Facility: CLINIC | Age: 31
End: 2021-01-25

## 2021-02-10 ENCOUNTER — VIRTUAL VISIT (OUTPATIENT)
Dept: FAMILY MEDICINE | Facility: CLINIC | Age: 31
End: 2021-02-10
Payer: COMMERCIAL

## 2021-02-10 DIAGNOSIS — Z13.220 SCREENING FOR HYPERLIPIDEMIA: ICD-10-CM

## 2021-02-10 DIAGNOSIS — L70.0 ACNE VULGARIS: Primary | ICD-10-CM

## 2021-02-10 DIAGNOSIS — G43.009 MIGRAINE WITHOUT AURA AND WITHOUT STATUS MIGRAINOSUS, NOT INTRACTABLE: ICD-10-CM

## 2021-02-10 PROCEDURE — 99213 OFFICE O/P EST LOW 20 MIN: CPT | Mod: 95 | Performed by: INTERNAL MEDICINE

## 2021-02-10 RX ORDER — SPIRONOLACTONE 50 MG/1
TABLET, FILM COATED ORAL
Qty: 180 TABLET | Refills: 3 | Status: SHIPPED | OUTPATIENT
Start: 2021-02-10 | End: 2022-01-18

## 2021-02-10 RX ORDER — SUMATRIPTAN 25 MG/1
25 TABLET, FILM COATED ORAL
Qty: 30 TABLET | Refills: 0 | Status: SHIPPED | OUTPATIENT
Start: 2021-02-10 | End: 2022-02-22

## 2021-02-10 NOTE — PROGRESS NOTES
Lizzeth is a 30 year old who is being evaluated via a billable video visit.      How would you like to obtain your AVS? MyChart  If the video visit is dropped, the invitation should be resent by: Text to cell phone: [phone]  Will anyone else be joining your video visit? No    Video Start Time: 4:04    Assessment & Plan     Acne vulgaris  Doing well. Refilling medication and getting labs.   - spironolactone (ALDACTONE) 50 MG tablet; TAKE 2 TABLETS (100 MG) BY MOUTH DAILY  - **Potassium FUTURE anytime; Future    Migraine without aura and without status migrainosus, not intractable  Much improvement since starting Imitrex. Continue to evaluate for triggers. Continue Imitrex.   - SUMAtriptan (IMITREX) 25 MG tablet; Take 1 tablet (25 mg) by mouth at onset of headache for migraine May repeat in 2 hours. Max 4 tablets/24 hours.    Screening for hyperlipidemia  - Lipid panel reflex to direct LDL Fasting; Future    Lizzeth is due for a pap. She will schedule a physical with  Me in the near future.           Return in about 3 months (around 5/10/2021) for Physical Exam.    Stella Jones MD  [facility]    Brien Moreau is a 30 year old who presents for the following health issues     HPI       Medication Followup of spironolactone    Taking Medication as prescribed: yes    Side Effects:  None    Medication Helping Symptoms:  yes     Taking this for acne and this is working well for her.       Migraine     Since your last clinic visit, how have your headaches changed?  Improved    How often are you getting headaches or migraines? Once per month     Are you able to do normal daily activities when you have a migraine? Yes if taking her Imitrex. If not taking her medication she gets pretty knocked out by her headaches.    Are you taking rescue/relief medications? (Select all that apply) sumatriptan (Imitrex)    How helpful is your rescue/relief medication?  I get some relief, much more  manageable and then takes some ibuprofen.     Are you taking any medications to prevent migraines? (Select all that apply)  No    In the past 4 weeks, how often have you gone to urgent care or the emergency room because of your headaches?  0      Review of Systems   Constitutional, HEENT, cardiovascular, pulmonary, gi and gu systems are negative, except as otherwise noted.      Objective           Vitals:  No vitals were obtained today due to virtual visit.    Physical Exam   GENERAL: Healthy, alert and no distress  EYES: Eyes grossly normal to inspection.  No discharge or erythema, or obvious scleral/conjunctival abnormalities.  RESP: No audible wheeze, cough, or visible cyanosis.  No visible retractions or increased work of breathing.    SKIN: Visible skin clear. No significant rash, abnormal pigmentation or lesions.  NEURO: Cranial nerves grossly intact.  Mentation and speech appropriate for age.  PSYCH: Mentation appears normal, affect normal/bright, judgement and insight intact, normal speech and appearance well-groomed.          Video-Visit Details    Type of service:  Video Visit    Video End Time:4:12 PM    Originating Location (pt. Location): Home    Distant Location (provider location):  [facility]     Platform used for Video Visit: [employer]

## 2021-03-22 ENCOUNTER — IMMUNIZATION (OUTPATIENT)
Dept: NURSING | Facility: CLINIC | Age: 31
End: 2021-03-22
Payer: COMMERCIAL

## 2021-03-22 PROCEDURE — 91300 PR COVID VAC PFIZER DIL RECON 30 MCG/0.3 ML IM: CPT

## 2021-03-22 PROCEDURE — 0001A PR COVID VAC PFIZER DIL RECON 30 MCG/0.3 ML IM: CPT

## 2021-04-12 ENCOUNTER — IMMUNIZATION (OUTPATIENT)
Dept: NURSING | Facility: CLINIC | Age: 31
End: 2021-04-12
Attending: INTERNAL MEDICINE
Payer: COMMERCIAL

## 2021-04-12 PROCEDURE — 0002A PR COVID VAC PFIZER DIL RECON 30 MCG/0.3 ML IM: CPT

## 2021-04-12 PROCEDURE — 91300 PR COVID VAC PFIZER DIL RECON 30 MCG/0.3 ML IM: CPT

## 2021-04-29 ENCOUNTER — OFFICE VISIT (OUTPATIENT)
Dept: FAMILY MEDICINE | Facility: CLINIC | Age: 31
End: 2021-04-29
Payer: COMMERCIAL

## 2021-04-29 VITALS
RESPIRATION RATE: 14 BRPM | TEMPERATURE: 98.2 F | SYSTOLIC BLOOD PRESSURE: 116 MMHG | BODY MASS INDEX: 24.17 KG/M2 | HEIGHT: 67 IN | DIASTOLIC BLOOD PRESSURE: 84 MMHG | HEART RATE: 74 BPM | WEIGHT: 154 LBS | OXYGEN SATURATION: 100 %

## 2021-04-29 DIAGNOSIS — Z00.00 ROUTINE GENERAL MEDICAL EXAMINATION AT A HEALTH CARE FACILITY: Primary | ICD-10-CM

## 2021-04-29 DIAGNOSIS — Z13.220 SCREENING FOR HYPERLIPIDEMIA: ICD-10-CM

## 2021-04-29 DIAGNOSIS — Z12.4 SCREENING FOR MALIGNANT NEOPLASM OF CERVIX: ICD-10-CM

## 2021-04-29 LAB
CHOLEST SERPL-MCNC: 200 MG/DL
GLUCOSE SERPL-MCNC: 76 MG/DL (ref 70–99)
HDLC SERPL-MCNC: 81 MG/DL
LDLC SERPL CALC-MCNC: 106 MG/DL
NONHDLC SERPL-MCNC: 119 MG/DL
TRIGL SERPL-MCNC: 67 MG/DL

## 2021-04-29 PROCEDURE — G0145 SCR C/V CYTO,THINLAYER,RESCR: HCPCS | Performed by: INTERNAL MEDICINE

## 2021-04-29 PROCEDURE — 80061 LIPID PANEL: CPT | Performed by: INTERNAL MEDICINE

## 2021-04-29 PROCEDURE — 36415 COLL VENOUS BLD VENIPUNCTURE: CPT | Performed by: INTERNAL MEDICINE

## 2021-04-29 PROCEDURE — 87624 HPV HI-RISK TYP POOLED RSLT: CPT | Performed by: INTERNAL MEDICINE

## 2021-04-29 PROCEDURE — 82947 ASSAY GLUCOSE BLOOD QUANT: CPT | Performed by: INTERNAL MEDICINE

## 2021-04-29 PROCEDURE — 99395 PREV VISIT EST AGE 18-39: CPT | Performed by: INTERNAL MEDICINE

## 2021-04-29 ASSESSMENT — MIFFLIN-ST. JEOR: SCORE: 1446.17

## 2021-04-29 ASSESSMENT — PAIN SCALES - GENERAL: PAINLEVEL: NO PAIN (0)

## 2021-04-29 NOTE — PROGRESS NOTES
SUBJECTIVE:   CC: Lizzeth Alarcon is an 31 year old woman who presents for preventive health visit.       Patient has been advised of split billing requirements and indicates understanding: Yes  Healthy Habits:     Getting at least 3 servings of Calcium per day:  NO    Bi-annual eye exam:  Yes    Dental care twice a year:  Yes    Sleep apnea or symptoms of sleep apnea:  None    Diet:  Regular (no restrictions)    Frequency of exercise:  2-3 days/week    Duration of exercise:  30-45 minutes    Taking medications regularly:  Yes    Medication side effects:  None    PHQ-2 Total Score: 0    Additional concerns today:  No    Today's PHQ-2 Score:   PHQ-2 ( 1999 Pfizer) 4/23/2021   Q1: Little interest or pleasure in doing things 0   Q2: Feeling down, depressed or hopeless 0   PHQ-2 Score 0   Q1: Little interest or pleasure in doing things Not at all   Q2: Feeling down, depressed or hopeless Not at all   PHQ-2 Score 0       Abuse: Current or Past (Physical, Sexual or Emotional) - No  Do you feel safe in your environment? Yes    Have you ever done Advance Care Planning? (For example, a Health Directive, POLST, or a discussion with a medical provider or your loved ones about your wishes): No, advance care planning information given to patient to review.  Patient declined advance care planning discussion at this time.    Social History     Tobacco Use     Smoking status: Never Smoker     Smokeless tobacco: Never Used     Tobacco comment: no second hand smoke   Substance Use Topics     Alcohol use: Yes     Comment: 3-4/week         Alcohol Use 4/23/2021   Prescreen: >3 drinks/day or >7 drinks/week? No   Prescreen: >3 drinks/day or >7 drinks/week? -       Reviewed orders with patient.  Reviewed health maintenance and updated orders accordingly - Yes  Lab work is in process    Breast Cancer Screening:    Breast CA Risk Assessment (FHS-7) 4/23/2021   Do you have a family history of breast, colon, or ovarian cancer? No /  "Unknown         Patient under 40 years of age: Routine Mammogram Screening not recommended.   Pertinent mammograms are reviewed under the imaging tab.    History of abnormal Pap smear: NO - age 30-65 PAP every 5 years with negative HPV co-testing recommended  PAP / HPV 8/18/2017 6/26/2014 1/15/2009   PAP NIL NIL NIL     Reviewed and updated as needed this visit by clinical staff                 Reviewed and updated as needed this visit by Provider                    Review of Systems  CONSTITUTIONAL: NEGATIVE for fever, chills, change in weight  INTEGUMENTARU/SKIN: NEGATIVE for worrisome rashes, moles or lesions  EYES: NEGATIVE for vision changes or irritation  ENT: NEGATIVE for ear, mouth and throat problems  RESP: NEGATIVE for significant cough or SOB  BREAST: NEGATIVE for masses, tenderness or discharge  CV: NEGATIVE for chest pain, palpitations or peripheral edema  GI: NEGATIVE for nausea, abdominal pain, heartburn, or change in bowel habits  : NEGATIVE for unusual urinary or vaginal symptoms. Periods are regular.  MUSCULOSKELETAL: NEGATIVE for significant arthralgias or myalgia  NEURO: NEGATIVE for weakness, dizziness or paresthesias  PSYCHIATRIC: NEGATIVE for changes in mood or affect     OBJECTIVE:   /84   Pulse 74   Temp 98.2  F (36.8  C) (Tympanic)   Resp 14   Ht 1.702 m (5' 7\")   Wt 69.9 kg (154 lb)   SpO2 100%   BMI 24.12 kg/m    Physical Exam  GENERAL: healthy, alert and no distress  EYES: Eyes grossly normal to inspection, PERRL and conjunctivae and sclerae normal  HENT: ear canals and TM's normal, nose and mouth without ulcers or lesions  NECK: no adenopathy, no asymmetry, masses, or scars and thyroid normal to palpation  RESP: lungs clear to auscultation - no rales, rhonchi or wheezes  BREAST: normal without masses, tenderness or nipple discharge and no palpable axillary masses or adenopathy  CV: regular rate and rhythm, normal S1 S2, no S3 or S4, no murmur, click or rub, no peripheral " "edema and peripheral pulses strong  ABDOMEN: soft, nontender, no hepatosplenomegaly, no masses and bowel sounds normal   (female): normal female external genitalia, normal urethral meatus, vaginal mucosa pink, moist, well rugated, and normal cervix/adnexa/uterus without masses or discharge  MS: no gross musculoskeletal defects noted, no edema  SKIN: no suspicious lesions or rashes  NEURO: Normal strength and tone, mentation intact and speech normal  PSYCH: mentation appears normal, affect normal/bright    Diagnostic Test Results:  Labs reviewed in Epic    ASSESSMENT/PLAN:   1. Routine general medical examination at a health care facility  Healthy 32 y/o female, up to date on HM. Family hx reviewed. Fasting labs today for biometric screen.  - Glucose    2. Screening for malignant neoplasm of cervix  - Pap imaged thin layer screen with HPV - recommended age 30 - 65 years (select HPV order below)  - HPV High Risk Types DNA Cervical    3. Screening for hyperlipidemia  - Lipid panel reflex to direct LDL Fasting    Patient has been advised of split billing requirements and indicates understanding: Yes  COUNSELING:  Reviewed preventive health counseling, as reflected in patient instructions    Estimated body mass index is 23.54 kg/m  as calculated from the following:    Height as of 10/30/19: 1.7 m (5' 6.93\").    Weight as of 10/30/19: 68 kg (150 lb).        She reports that she has never smoked. She has never used smokeless tobacco.      Counseling Resources:  ATP IV Guidelines  Pooled Cohorts Equation Calculator  Breast Cancer Risk Calculator  BRCA-Related Cancer Risk Assessment: FHS-7 Tool  FRAX Risk Assessment  ICSI Preventive Guidelines  Dietary Guidelines for Americans, 2010  USDA's MyPlate  ASA Prophylaxis  Lung CA Screening    Stella Jones MD  [facility]  "

## 2021-05-04 LAB
COPATH REPORT: NORMAL
PAP: NORMAL

## 2021-05-05 LAB
FINAL DIAGNOSIS: NORMAL
HPV HR 12 DNA CVX QL NAA+PROBE: NEGATIVE
HPV16 DNA SPEC QL NAA+PROBE: NEGATIVE
HPV18 DNA SPEC QL NAA+PROBE: NEGATIVE
SPECIMEN DESCRIPTION: NORMAL
SPECIMEN SOURCE CVX/VAG CYTO: NORMAL

## 2021-07-02 ENCOUNTER — APPOINTMENT (OUTPATIENT)
Dept: URGENT CARE | Facility: CLINIC | Age: 31
End: 2021-07-02
Payer: COMMERCIAL

## 2021-09-18 ENCOUNTER — HEALTH MAINTENANCE LETTER (OUTPATIENT)
Age: 31
End: 2021-09-18

## 2021-11-23 ENCOUNTER — OFFICE VISIT (OUTPATIENT)
Dept: DERMATOLOGY | Facility: CLINIC | Age: 31
End: 2021-11-23
Payer: COMMERCIAL

## 2021-11-23 DIAGNOSIS — L91.0 KELOID SCAR: ICD-10-CM

## 2021-11-23 DIAGNOSIS — Z12.83 SCREENING EXAM FOR SKIN CANCER: ICD-10-CM

## 2021-11-23 DIAGNOSIS — L81.1 MELASMA: ICD-10-CM

## 2021-11-23 DIAGNOSIS — L70.0 ACNE VULGARIS: Primary | ICD-10-CM

## 2021-11-23 PROCEDURE — 99204 OFFICE O/P NEW MOD 45 MIN: CPT | Mod: GC | Performed by: DERMATOLOGY

## 2021-11-23 RX ORDER — HYDROCORTISONE 25 MG/G
CREAM TOPICAL 2 TIMES DAILY PRN
Qty: 30 G | Refills: 3 | Status: SHIPPED | OUTPATIENT
Start: 2021-11-23 | End: 2022-10-28

## 2021-11-23 RX ORDER — HYDROQUINONE 40 MG/G
CREAM TOPICAL
Qty: 30 G | Refills: 3 | Status: SHIPPED | OUTPATIENT
Start: 2021-11-23 | End: 2022-10-28

## 2021-11-23 RX ORDER — TRETINOIN 0.25 MG/G
CREAM TOPICAL
Qty: 45 G | Refills: 3 | Status: SHIPPED | OUTPATIENT
Start: 2021-11-23 | End: 2022-10-28

## 2021-11-23 ASSESSMENT — PAIN SCALES - GENERAL: PAINLEVEL: NO PAIN (0)

## 2021-11-23 NOTE — PROGRESS NOTES
[facility] Dermatology Note  Encounter Date: Nov 23, 2021  Office Visit     Dermatology Problem List:  1. Keloidal plaques - chest and back  2. Melasma - hydrocortisone 2.5%, hydroquinone 4%, tretinoin 0.025%    ____________________________________________    Assessment & Plan:    # Melasma - peroral, cheeks, forehead  Discussed natural history of disease including chronic nature of condition, relationship to sun-exposure and potential hormonal contribution. As stable on OCP will not change at present. Discussed need for diligent sun avoidance and protection with hand-out of preferred products and general approaches provided. Discussed treatment options including skin-lightening agents, chemical peels, and laser and light-based therapy. Will start with topicals.  - Sun precaution was advised including the use of sun screens of SPF 30 or higher, sun protective clothing, and avoidance of tanning beds.  - START Hydroquinone 4% cream at bedtime x 3 months; education given risk of dyspigmentation and will need discussion of maintenance dosing at follow-up if needed  - START hydrocortisone 2.5% cream - discussed conservative steroid use  - START tretinoin 0.025% cream - education given on teratogenicity, dryness and irritation    # Keloidal plaques - prior cystic acne - chest and left upper back  - Offered ILK - as asymptomatic, deferred injections today    # Benign lesions (cherry angiomas, SKs, lentigines, clinically banal appearing melanocytic nevi, congenital melanocytic nevi, cafe au lait)  - ABCDs of melanoma were discussed and self skin checks were advised.  - Signs and symptoms of NMSC discussed  - Sun precaution was advised including the use of sun screens of SPF 30 or higher, sun protective clothing, and avoidance of tanning beds.  - Ultimately reassurance provided and benign nature of condition discussed    Procedures Performed:   None    Follow-up: 3 month(s) in-person, or earlier for new  or changing lesions    Staff and Scribe:     Scribe Disclosure:  I, Rahat Talamanteslinda, am serving as a scribe to document services personally performed by Trevon Sal MD based on data collection and the provider's statements to me.     This patient was staffed with Dr. Sal.    Rasta Malone MD  Internal Medicine - Dermatology PGY 4    Staff Physician Comments:   I saw and evaluated the patient with the resident and I agree with the assessment and plan.  I was present for the examination. I have made edits if needed.    Trevon Sal MD  Staff Dermatologist and Dermatopathologist  , Department of Dermatology    ____________________________________________    CC: Skin Check (Mole check - possible melasma on face.)    HPI:  Ms. Lizzeth Alarcon is a(n) 31 year old female who presents today as a new patient for skin check.  - Spot on left leg which was darker but has now flattened out and lightened  - Keloids on chest and back in areas of cystic acne - had little benefit from ILK and thought they were painful  - 1 year duration perioral, bilateral cheeks and forehead darkening; getting better with sun protection and applies daily now  - Long term use of same contraceptive agent - no other clear hormonal trigger  - Patient is otherwise feeling well, without additional skin concerns.    Labs Reviewed:  N/A    Physical Exam:  SKIN: Total skin excluding the undergarment areas was performed. The exam included the head/face, neck, both arms, chest, back, abdomen, both legs, digits and/or nails.   - Finely raised brown well-circumscribed plaque on right abdomen without concerning dermoscopic feature  - Light brown patch on right abdomen  - Homogenous tan macules on sun exposed skin  - Few scattered bright red domed papules on trunk and extremities  - Waxy stuck-on papule on left lateral leg w/o concerning clinical features  - Well-circumscribed brown macules and fleshy papules w/o  concerning dermatoscopic or clinical features  - Perioral, cheeks and forehead background hyperpigmentation with scattered tan macules  - Keloidal plaques on chest and left upper back   - No other lesions of concern on areas examined.     Medications:  Current Outpatient Medications   Medication     ENSKYCE 0.15-30 MG-MCG tablet     hydrocortisone, Perianal, (HYDROCORTISONE) 2.5 % cream     hydroquinone (BOB) 4 % external cream     spironolactone (ALDACTONE) 50 MG tablet     SUMAtriptan (IMITREX) 25 MG tablet     tretinoin (RETIN-A) 0.025 % external cream     No current facility-administered medications for this visit.      Past Medical History:   Patient Active Problem List   Diagnosis     Acne     Contraceptive management     Keloid scar     Contraception     BMI > 25     Migraine without aura and without status migrainosus, not intractable     Past Medical History:   Diagnosis Date     Acne      Closed fracture of olecranon process of ulna 4/2006    left olecranon fracture     Contraceptive management 2008     Exercise-induced asthma      Exercise-induced asthma         CC Referred Self, MD  No address on file on close of this encounter.

## 2021-11-23 NOTE — NURSING NOTE
Dermatology Rooming Note    Lizzeth Alarcon's goals for this visit include:   Chief Complaint   Patient presents with     Skin Check     Mole check - possible melasma on face.     Liv King, CMA

## 2021-11-23 NOTE — PATIENT INSTRUCTIONS
"Sunscreen   What does \"broad spectrum mean\"?   The best sunscreens protect against all UVB (Burning) rays and UVA (Aging, cAncer, tAnning) rays.    What does SPF mean?   SPF stands for  Sun Protection Factor  and represents the ability to screen only UVB (burning) rays. UVB rays are mostly blocked in all sunscreens, but only those that contain titanium dioxide, zinc oxide, mexoryl or Parsol 1789 (avobenzone) block the UVA spectrum. Zinc oxide is the best of all. Even though a sunscreen is labeled  UVA/UVB Protection  that is not entirely accurate because even partial protection allows this label!    What SPF should I chose?   Aim to get a sunscreen that is at least sun protection factor (SPF) 30. SPF 15 provides about 92-93% coverage, SPF 30 about 95-97% coverage, and SPF 45 about 98% coverage. That is to say, SPF 30 is not twice as good as SPF 15; think of it as a curve graph. If covering your whole body, you should be using 30 grams, or one ounce, which is how much is in one shot glass! That s a THICK layer!    UVA BLOCKERS:   Make sure your sunscreen has one of these active ingredients!   Everything else in the  active ingredients  box of a sunscreen label blocks UVB only.   Zinc Oxide (preferred)   Titanium dioxide   Parsol 1789 (avobenzone)   Elta MD: available at [facility] and [facility] pharmacy  Mexoryl   Examples of some good sunscreens*   Absolutely-natural.[email or web address]   Aveeno   Baby [place] sunscreens   Tomás   Blue Lizard   BullFrog   CaliforniaBaby   Coppertone Spectra3   [facility]   Rick   Fallene/TotalBlock   Neutrogena   NoAd   Vanicream   WaterBabies  Sticks work great, like Neutrogena pure and free baby SPF 60 stick    Darker Skin Types & Sunscreen  Patients with darker skin colors tend to like tinted sunscreens better as they appear less chalky on the skin. Many BB Creams are now available but make sure the sunscreen SPF is at least 30! Here are some brands of tinted sunscreens:  Neutrogenia Tinted  Gilma " (sold at [employer]wise)  La Roche Posay Anthelios 60 Ultra Light Sunscreen Fluid  Cerave Sunscreen SPF 50 Face Lotion Invisible Zinc  Clarins UV Plus sunscreen Multiprotection Tint  Dr Berry + Every Sun Day sunscreen  Coppertone ClearlySheer Lotion SPF 50  Up & Up (Target) Sheer Dry-touch Lotion SPF 30  Palmers Cocoa Butter Formula Evertone Suncare Sunscreen Stick SPF 30  Per-fect Beauty skin Perfection Plus with SPF 30  Junetics Pure Energy Brightening Day Cream with Broad Spectrum SPF 50  Clinque SPF 30 Mineral Sunscreen Fluid for Face  Mario Multicorrection 5 in 1 Chest, Neck and Face Cream with SPF 30  Cover FX Clear Cover Invisible Sunscreen Broad Spectrum SPF 30  Ellie Radha Age Perfect Hydra-Nutrition Facial Oil SPF 30  Solbar Shield SPF 40  Tropical Sands All Natural SPF 50  * Note, this list is not meant to be comprehensive, just trying to pull together some names that might be helpful to you. If they are not at a local store, they can be found on-line for order. EACH NAMEBRAND MAKES MULTIPLE TYPES SO YOU STILL HAVE TO CHECK FOR ONE OF THE FOUR INGREDIENTS LISTED IN THE LEFT COLUMN!!!   Combination sunscreen-insect repellants are not recommended as sunscreen needs to be reapplied every 2 hours; insect repellant does not, and that would lead to too much DEET exposure.   Sunscreen is not recommended for infants under the age of 6 months. Use clothing, shade and sun avoidance for small infants. Sunscreen clothing and hats are also important for people of all ages. Note that Openera* is a company based out of [place], [place]! Other good items can be found in stores and on-line.   Sunscreen sprays are okay for RE-APPLICATION only. Most people do not spray enough on to get good enough protection. Recommendation: Use a lotion-based sunscreen to get a good first/base layer.   Vitamin D: We get vitamin D through the skin. If you do not get enough sun in the summer to get tan lines, you should take a vitamin D  supplement: 400units for children and 1000units for adults per day.   Good daily facial moisturizers with sunscreen:   [facility] Block Sheer   Anthelios by LaRochePosay   Oil of Olay Complete Defense for sensitive skin   Sunscreen Recommendations for Sensitive Skin    The following sunscreens may be better for your child's sensitive skin. The main active ingredients are inert, either titanium dioxide or zinc oxide. These ingredients are less irritating than chemical sunscreens.   1) Aveeno Active Natural Protection Mineral Block Lotion SPF 30   2) Aveeno Baby Natural Protection Face Stick SPF 50+   3) Banana Boat Natural Reflect (baby or kids) SPF 50+   4) Baker's Bees Chemical-Free Sunscreen SPF 30   5) Blue Lizard Baby SPF 30+   6) Blue Lizard for Sensitive Skin SPF 30+   7) Cotz Pure SPF 30   8) Cotz Face SPF 40   9) Cotz 20% Zinc SPF 35   10) CVS Sensitive Skin SPF 30   11) CVS Baby Lotion Sunscreen SPF 60+   12) Mustella Broad Spectrum SPF 50+/Mineral Sunscreen Stick   13) Neutrogena Sensitive Skin SPF 30   14) Neutrogena Sensitive Skin SPF 60+   15) PreSun Sensitive Sunblock SPF 28   16) Vanicream Sunscreen for Sensitive Skin SPF 60   17) Walgreen's Sensitive Skin SPF 70   Many local pharmacies and [employer] carry some of these options or you may purchase them online at www.Quat-E.Moasis Global or www.Parature.     Sun protective Clothing:  www.coolibar.com  www.sunprecautions.com  [email or web address].[email or web address]

## 2021-11-23 NOTE — LETTER
11/23/2021       RE: Lizzeth Alarcon  [address]  [place] [facility] [address]     Dear Colleague,    Thank you for referring your patient, Lizzeth Alarcon, to the [facility] [place] [facility]. Please see a copy of my visit note below.    [facility] Dermatology Note  Encounter Date: Nov 23, 2021  Office Visit     Dermatology Problem List:  1. Keloidal plaques - chest and back  2. Melasma - hydrocortisone 2.5%, hydroquinone 4%, tretinoin 0.025%    ____________________________________________    Assessment & Plan:    # Melasma - peroral, cheeks, forehead  Discussed natural history of disease including chronic nature of condition, relationship to sun-exposure and potential hormonal contribution. As stable on OCP will not change at present. Discussed need for diligent sun avoidance and protection with hand-out of preferred products and general approaches provided. Discussed treatment options including skin-lightening agents, chemical peels, and laser and light-based therapy. Will start with topicals.  - Sun precaution was advised including the use of sun screens of SPF 30 or higher, sun protective clothing, and avoidance of tanning beds.  - START Hydroquinone 4% cream at bedtime x 3 months; education given risk of dyspigmentation and will need discussion of maintenance dosing at follow-up if needed  - START hydrocortisone 2.5% cream - discussed conservative steroid use  - START tretinoin 0.025% cream - education given on teratogenicity, dryness and irritation    # Keloidal plaques - prior cystic acne - chest and left upper back  - Offered ILK - as asymptomatic, deferred injections today    # Benign lesions (cherry angiomas, SKs, lentigines, clinically banal appearing melanocytic nevi, congenital melanocytic nevi, cafe au lait)  - ABCDs of melanoma were discussed and self skin checks were advised.  - Signs and  symptoms of NMSC discussed  - Sun precaution was advised including the use of sun screens of SPF 30 or higher, sun protective clothing, and avoidance of tanning beds.  - Ultimately reassurance provided and benign nature of condition discussed    Procedures Performed:   None    Follow-up: 3 month(s) in-person, or earlier for new or changing lesions    Staff and Scribe:     Scribe Disclosure:  I, Rahat Cancino, am serving as a scribe to document services personally performed by Trevon Sal MD based on data collection and the provider's statements to me.     This patient was staffed with Dr. Sal.    Rasta Malone MD  Internal Medicine - Dermatology PGY 4    Staff Physician Comments:   I saw and evaluated the patient with the resident and I agree with the assessment and plan.  I was present for the examination. I have made edits if needed.    Trevon Sal MD  Staff Dermatologist and Dermatopathologist  , Department of Dermatology    ____________________________________________    CC: Skin Check (Mole check - possible melasma on face.)    HPI:  Ms. Lizzeth Alarcon is a(n) 31 year old female who presents today as a new patient for skin check.  - Spot on left leg which was darker but has now flattened out and lightened  - Keloids on chest and back in areas of cystic acne - had little benefit from ILK and thought they were painful  - 1 year duration perioral, bilateral cheeks and forehead darkening; getting better with sun protection and applies daily now  - Long term use of same contraceptive agent - no other clear hormonal trigger  - Patient is otherwise feeling well, without additional skin concerns.    Labs Reviewed:  N/A    Physical Exam:  SKIN: Total skin excluding the undergarment areas was performed. The exam included the head/face, neck, both arms, chest, back, abdomen, both legs, digits and/or nails.   - Finely raised brown well-circumscribed plaque on right abdomen  without concerning dermoscopic feature  - Light brown patch on right abdomen  - Homogenous tan macules on sun exposed skin  - Few scattered bright red domed papules on trunk and extremities  - Waxy stuck-on papule on left lateral leg w/o concerning clinical features  - Well-circumscribed brown macules and fleshy papules w/o concerning dermatoscopic or clinical features  - Perioral, cheeks and forehead background hyperpigmentation with scattered tan macules  - Keloidal plaques on chest and left upper back   - No other lesions of concern on areas examined.     Medications:  Current Outpatient Medications   Medication     ENSKYCE 0.15-30 MG-MCG tablet     hydrocortisone, Perianal, (HYDROCORTISONE) 2.5 % cream     hydroquinone (BOB) 4 % external cream     spironolactone (ALDACTONE) 50 MG tablet     SUMAtriptan (IMITREX) 25 MG tablet     tretinoin (RETIN-A) 0.025 % external cream     No current facility-administered medications for this visit.      Past Medical History:   Patient Active Problem List   Diagnosis     Acne     Contraceptive management     Keloid scar     Contraception     BMI > 25     Migraine without aura and without status migrainosus, not intractable     Past Medical History:   Diagnosis Date     Acne      Closed fracture of olecranon process of ulna 4/2006    left olecranon fracture     Contraceptive management 2008     Exercise-induced asthma      Exercise-induced asthma         CC Referred Self, MD  No address on file on close of this encounter.

## 2021-12-06 ENCOUNTER — TELEPHONE (OUTPATIENT)
Dept: DERMATOLOGY | Facility: CLINIC | Age: 31
End: 2021-12-06
Payer: COMMERCIAL

## 2021-12-07 NOTE — TELEPHONE ENCOUNTER
Left a voicemail informing Lizzeth that her Tretinoin and Hydroquinone are for cosmetic reasons and she will have to pay out of pocket for this medication.  Goodrx.com information given.

## 2022-02-22 ENCOUNTER — VIRTUAL VISIT (OUTPATIENT)
Dept: FAMILY MEDICINE | Facility: OTHER | Age: 32
End: 2022-02-22
Payer: COMMERCIAL

## 2022-02-22 DIAGNOSIS — L70.0 ACNE VULGARIS: ICD-10-CM

## 2022-02-22 DIAGNOSIS — G43.009 MIGRAINE WITHOUT AURA AND WITHOUT STATUS MIGRAINOSUS, NOT INTRACTABLE: ICD-10-CM

## 2022-02-22 PROCEDURE — 99214 OFFICE O/P EST MOD 30 MIN: CPT | Mod: 95 | Performed by: STUDENT IN AN ORGANIZED HEALTH CARE EDUCATION/TRAINING PROGRAM

## 2022-02-22 RX ORDER — SUMATRIPTAN 25 MG/1
25 TABLET, FILM COATED ORAL
Qty: 30 TABLET | Refills: 0 | Status: SHIPPED | OUTPATIENT
Start: 2022-02-22 | End: 2022-08-01

## 2022-02-22 RX ORDER — SPIRONOLACTONE 100 MG/1
TABLET, FILM COATED ORAL
Qty: 90 TABLET | Refills: 3 | Status: SHIPPED | OUTPATIENT
Start: 2022-02-22 | End: 2022-08-04

## 2022-02-22 NOTE — PROGRESS NOTES
Lizzeth is a 31 year old who is being evaluated via a billable video visit.      How would you like to obtain your AVS? [employer]  If the video visit is dropped, the invitation should be resent by: will connect through [employer]   Will anyone else be joining your video visit? No      Video Start Time: 11:48    Assessment & Plan     Acne vulgaris  Doing quite well with 100 mg daily of spironolactone.  No adverse effects with dizziness.  Taking this for acne treatment.  Has been on this for several years.  Overall stable  - spironolactone (ALDACTONE) 100 MG tablet; TAKE 2 TABLETS DAILY    Migraine without aura and without status migrainosus, not intractable  Takes as needed Imitrex, 1-2 times per month.  Typically does get rid of her migraine.  Encouraged her to take naproxen but that is well for better efficacy.  She does not miss extra time with her family or work obligations due to this.  Could consider future daily medicine if she wishes.  Refills sent in today.  Overall stable  - SUMAtriptan (IMITREX) 25 MG tablet; Take 1 tablet (25 mg) by mouth at onset of headache for migraine May repeat in 2 hours. Max 4 tablets/24 hours.      PRAVEENA CEJA MD  [facility]   Lizzeth is a 31 year old who presents for the following health issues     History of Present Illness     Reason for visit:  Perscription refill    She eats 2-3 servings of fruits and vegetables daily.She consumes 1 sweetened beverage(s) daily.She exercises with enough effort to increase her heart rate 30 to 60 minutes per day.  She exercises with enough effort to increase her heart rate 3 or less days per week.   She is taking medications regularly.       Medication Followup of Spironolactone and Imitrex    Taking Medication as prescribed: yes    Side Effects:  None    Medication Helping Symptoms:  yes       Review of Systems   Constitutional, HEENT, cardiovascular, pulmonary, gi and gu systems are negative, except  as otherwise noted.      Objective           Vitals:  No vitals were obtained today due to virtual visit.    Physical Exam   GENERAL: Healthy, alert and no distress  EYES: Eyes grossly normal to inspection.  No discharge or erythema, or obvious scleral/conjunctival abnormalities.  RESP: No audible wheeze, cough, or visible cyanosis.  No visible retractions or increased work of breathing.    SKIN: Visible skin clear. No significant rash, abnormal pigmentation or lesions.  NEURO: Cranial nerves grossly intact.  Mentation and speech appropriate for age.  PSYCH: Mentation appears normal, affect normal/bright, judgement and insight intact, normal speech and appearance well-groomed.          Video-Visit Details    Type of service:  Video Visit    Video End Time:11:54 AM    Originating Location (pt. Location): Home    Distant Location (provider location):  [facility]     Platform used for Video Visit: [employer]

## 2022-05-10 ASSESSMENT — ENCOUNTER SYMPTOMS
NERVOUS/ANXIOUS: 0
FREQUENCY: 0
PARESTHESIAS: 0
EYE PAIN: 0
NAUSEA: 0
WEAKNESS: 0
JOINT SWELLING: 0
DIZZINESS: 0
ABDOMINAL PAIN: 0
FEVER: 0
DIARRHEA: 0
SORE THROAT: 0
DYSURIA: 0
CHILLS: 0
COUGH: 0
HEMATURIA: 0
HEARTBURN: 0
MYALGIAS: 0
PALPITATIONS: 0
HEADACHES: 0
BREAST MASS: 0
ARTHRALGIAS: 0
CONSTIPATION: 0
HEMATOCHEZIA: 0
SHORTNESS OF BREATH: 0

## 2022-05-12 NOTE — PROGRESS NOTES
SUBJECTIVE:   CC: Lizzeth Alarcon is an 32 year old woman who presents for preventive health visit.       Patient has been advised of split billing requirements and indicates understanding: Yes  Healthy Habits:     Getting at least 3 servings of Calcium per day:  Yes    Bi-annual eye exam:  Yes    Dental care twice a year:  Yes    Sleep apnea or symptoms of sleep apnea:  None    Diet:  Regular (no restrictions)    Frequency of exercise:  2-3 days/week    Duration of exercise:  45-60 minutes    Taking medications regularly:  Yes    Medication side effects:  Not applicable    PHQ-2 Total Score: 0    Additional concerns today:  No  1) migraines are under control , one out of 5 times is when it is bad , good control with imitrex   2) acne , spironolactone , does not use the topical     Maternal aunt roland been diagnosed with breast cancer   Periods regualr , she skips get a period q 3 to 4 months   Today's PHQ-2 Score:   PHQ-2 ( 1999 Pfizer) 5/10/2022   Q1: Little interest or pleasure in doing things 0   Q2: Feeling down, depressed or hopeless 0   PHQ-2 Score 0   PHQ-2 Total Score (12-17 Years)- Positive if 3 or more points; Administer PHQ-A if positive -   Q1: Little interest or pleasure in doing things Not at all   Q2: Feeling down, depressed or hopeless Not at all   PHQ-2 Score 0       Abuse: Current or Past (Physical, Sexual or Emotional) - No  Do you feel safe in your environment? Yes        Social History     Tobacco Use     Smoking status: Never Smoker     Smokeless tobacco: Never Used     Tobacco comment: no second hand smoke   Substance Use Topics     Alcohol use: Yes     Comment: 3-4/week     If you drink alcohol do you typically have >3 drinks per day or >7 drinks per week? No    Alcohol Use 5/10/2022   Prescreen: >3 drinks/day or >7 drinks/week? No   Prescreen: >3 drinks/day or >7 drinks/week? -   No flowsheet data found.    Reviewed orders with patient.  Reviewed health maintenance and updated orders  accordingly - Yes  Lab work is in process  Labs reviewed in EPIC  BP Readings from Last 3 Encounters:   05/13/22 113/71   04/29/21 116/84   10/30/19 124/80    Wt Readings from Last 3 Encounters:   05/13/22 75.1 kg (165 lb 9.6 oz)   04/29/21 69.9 kg (154 lb)   10/30/19 68 kg (150 lb)                  Patient Active Problem List   Diagnosis     Acne     Contraceptive management     Keloid scar     Contraception     BMI > 25     Migraine without aura and without status migrainosus, not intractable     Past Surgical History:   Procedure Laterality Date     NO HISTORY OF SURGERY         Social History     Tobacco Use     Smoking status: Never Smoker     Smokeless tobacco: Never Used     Tobacco comment: no second hand smoke   Substance Use Topics     Alcohol use: Yes     Comment: 3-4/week     Family History   Problem Relation Age of Onset     Thyroid Disease Mother      Depression Mother      Mental Illness Mother      Respiratory Father         Asthma     Respiratory Brother         Exercised induced Asthma     Breast Cancer Other      Colon Cancer No family hx of      Diabetes No family hx of      Myocardial Infarction No family hx of      Melanoma No family hx of      Skin Cancer No family hx of          Current Outpatient Medications   Medication Sig Dispense Refill     ENSKYCE 0.15-30 MG-MCG tablet   0     hydrocortisone, Perianal, (HYDROCORTISONE) 2.5 % cream Place rectally 2 times daily as needed for hemorrhoids 30 g 3     hydroquinone (BOB) 4 % external cream Mix with hydrocortisone and tretinoin and apply nightly to areas of hyperpigmentation 30 g 3     spironolactone (ALDACTONE) 100 MG tablet TAKE 2 TABLETS DAILY 90 tablet 3     SUMAtriptan (IMITREX) 25 MG tablet Take 1 tablet (25 mg) by mouth at onset of headache for migraine May repeat in 2 hours. Max 4 tablets/24 hours. 30 tablet 0     tretinoin (RETIN-A) 0.025 % external cream Use every night 45 g 3     Allergies   Allergen Reactions     Minocycline       Joint pains     Recent Labs   Lab Test 04/29/21  0846 10/14/19  0951 07/22/19  1619   * 122*  --    HDL 81 68  --    TRIG 67 103  --    CR  --   --  0.74   GFRESTIMATED  --   --  >90   GFRESTBLACK  --   --  >90   POTASSIUM  --   --  4.1        Breast Cancer Screening:    Breast CA Risk Assessment (FHS-7) 4/23/2021   Do you have a family history of breast, colon, or ovarian cancer? No / Unknown         Patient under 40 years of age: Routine Mammogram Screening not recommended.   Pertinent mammograms are reviewed under the imaging tab.    History of abnormal Pap smear: NO - age 30- 65 PAP every 3 years recommended  PAP / HPV Latest Ref Rng & Units 4/29/2021 8/18/2017 6/26/2014   PAP (Historical) - NIL NIL NIL   HPV16 NEG:Negative Negative - -   HPV18 NEG:Negative Negative - -   HRHPV NEG:Negative Negative - -     Reviewed and updated as needed this visit by clinical staff                    Reviewed and updated as needed this visit by Provider                   Past Medical History:   Diagnosis Date     Acne      Closed fracture of olecranon process of ulna 4/2006    left olecranon fracture     Contraceptive management 2008     Exercise-induced asthma      Exercise-induced asthma       Past Surgical History:   Procedure Laterality Date     NO HISTORY OF SURGERY         Review of Systems   Constitutional: Negative for chills and fever.   HENT: Negative for congestion, ear pain, hearing loss and sore throat.    Eyes: Negative for pain and visual disturbance.   Respiratory: Negative for cough and shortness of breath.    Cardiovascular: Negative for chest pain, palpitations and peripheral edema.   Gastrointestinal: Negative for abdominal pain, constipation, diarrhea, heartburn, hematochezia and nausea.   Breasts:  Negative for tenderness, breast mass and discharge.   Genitourinary: Negative for dysuria, frequency, genital sores, hematuria, pelvic pain, urgency, vaginal bleeding and vaginal discharge.    Musculoskeletal: Negative for arthralgias, joint swelling and myalgias.   Skin: Negative for rash.   Neurological: Negative for dizziness, weakness, headaches and paresthesias.   Psychiatric/Behavioral: Negative for mood changes. The patient is not nervous/anxious.      CONSTITUTIONAL: NEGATIVE for fever, chills, change in weight  INTEGUMENTARU/SKIN: NEGATIVE for worrisome rashes, moles or lesions  EYES: NEGATIVE for vision changes or irritation  ENT: NEGATIVE for ear, mouth and throat problems  RESP: NEGATIVE for significant cough or SOB  BREAST: NEGATIVE for masses, tenderness or discharge  CV: NEGATIVE for chest pain, palpitations or peripheral edema  GI: NEGATIVE for nausea, abdominal pain, heartburn, or change in bowel habits  : NEGATIVE for unusual urinary or vaginal symptoms. Periods are regular.  MUSCULOSKELETAL: NEGATIVE for significant arthralgias or myalgia  NEURO: NEGATIVE for weakness, dizziness or paresthesias  PSYCHIATRIC: NEGATIVE for changes in mood or affect     OBJECTIVE:   There were no vitals taken for this visit.  Physical Exam  GENERAL: healthy, alert and no distress  EYES: Eyes grossly normal to inspection, PERRL and conjunctivae and sclerae normal  HENT: ear canals and TM's normal, nose and mouth without ulcers or lesions  NECK: no adenopathy, no asymmetry, masses, or scars and thyroid normal to palpation  RESP: lungs clear to auscultation - no rales, rhonchi or wheezes  BREAST: normal without masses, tenderness or nipple discharge and no palpable axillary masses or adenopathy  CV: regular rate and rhythm, normal S1 S2, no S3 or S4, no murmur, click or rub, no peripheral edema and peripheral pulses strong  ABDOMEN: soft, nontender, no hepatosplenomegaly, no masses and bowel sounds normal  MS: no gross musculoskeletal defects noted, no edema  SKIN: no suspicious lesions or rashes  NEURO: Normal strength and tone, mentation intact and speech normal  PSYCH: mentation appears normal, affect  "normal/bright    Diagnostic Test Results:  Labs reviewed in Epic  No results found for any visits on 05/13/22.    ASSESSMENT/PLAN:   (Z00.00) Routine general medical examination at a health care facility  (primary encounter diagnosis)  Comment: Discussed diet,calcium,exercise.Went over self breast exam.Thin prep was NOT  done.Eyes and teeth UTD.No immunizations needed today.See orders below for tests ordered and screening needed.    Plan: REVIEW OF HEALTH MAINTENANCE PROTOCOL ORDERS,         Lipid panel reflex to direct LDL Fasting,         Glucose        Will check fasting labs in the future   Sees derm for acne     (G43.009) Migraine without aura and without status migrainosus, not intractable  Comment: are currently well controlled , uses Imitrex as needed which helps   Plan: continue same     Patient has been advised of split billing requirements and indicates understanding: Yes    COUNSELING:  Reviewed preventive health counseling, as reflected in patient instructions       Regular exercise       Healthy diet/nutrition       Vision screening       Hearing screening       Contraception    Estimated body mass index is 24.12 kg/m  as calculated from the following:    Height as of 4/29/21: 1.702 m (5' 7\").    Weight as of 4/29/21: 69.9 kg (154 lb).        She reports that she has never smoked. She has never used smokeless tobacco.      Counseling Resources:  ATP IV Guidelines  Pooled Cohorts Equation Calculator  Breast Cancer Risk Calculator  BRCA-Related Cancer Risk Assessment: FHS-7 Tool  FRAX Risk Assessment  ICSI Preventive Guidelines  Dietary Guidelines for Americans, 2010  USDA's MyPlate  ASA Prophylaxis  Lung CA Screening    Rita Blum MD  [facility]  "

## 2022-05-13 ENCOUNTER — OFFICE VISIT (OUTPATIENT)
Dept: FAMILY MEDICINE | Facility: CLINIC | Age: 32
End: 2022-05-13
Payer: COMMERCIAL

## 2022-05-13 VITALS
SYSTOLIC BLOOD PRESSURE: 113 MMHG | HEART RATE: 86 BPM | OXYGEN SATURATION: 100 % | TEMPERATURE: 98 F | HEIGHT: 67 IN | BODY MASS INDEX: 25.99 KG/M2 | WEIGHT: 165.6 LBS | DIASTOLIC BLOOD PRESSURE: 71 MMHG

## 2022-05-13 DIAGNOSIS — Z00.00 ROUTINE GENERAL MEDICAL EXAMINATION AT A HEALTH CARE FACILITY: Primary | ICD-10-CM

## 2022-05-13 DIAGNOSIS — G43.009 MIGRAINE WITHOUT AURA AND WITHOUT STATUS MIGRAINOSUS, NOT INTRACTABLE: ICD-10-CM

## 2022-05-13 PROCEDURE — 99395 PREV VISIT EST AGE 18-39: CPT | Performed by: FAMILY MEDICINE

## 2022-05-13 ASSESSMENT — ENCOUNTER SYMPTOMS
FREQUENCY: 0
CONSTIPATION: 0
WEAKNESS: 0
PARESTHESIAS: 0
DYSURIA: 0
HEARTBURN: 0
DIZZINESS: 0
BREAST MASS: 0
ABDOMINAL PAIN: 0
EYE PAIN: 0
HEADACHES: 0
NERVOUS/ANXIOUS: 0
NAUSEA: 0
PALPITATIONS: 0
DIARRHEA: 0
JOINT SWELLING: 0
SORE THROAT: 0
ARTHRALGIAS: 0
MYALGIAS: 0
FEVER: 0
SHORTNESS OF BREATH: 0
COUGH: 0
HEMATOCHEZIA: 0
CHILLS: 0
HEMATURIA: 0

## 2022-06-02 ENCOUNTER — LAB (OUTPATIENT)
Dept: LAB | Facility: CLINIC | Age: 32
End: 2022-06-02
Payer: COMMERCIAL

## 2022-06-02 DIAGNOSIS — Z00.00 ROUTINE GENERAL MEDICAL EXAMINATION AT A HEALTH CARE FACILITY: ICD-10-CM

## 2022-06-02 LAB
CHOLEST SERPL-MCNC: 219 MG/DL
FASTING STATUS PATIENT QL REPORTED: YES
FASTING STATUS PATIENT QL REPORTED: YES
GLUCOSE BLD-MCNC: 82 MG/DL (ref 70–99)
HDLC SERPL-MCNC: 83 MG/DL
LDLC SERPL CALC-MCNC: 115 MG/DL
NONHDLC SERPL-MCNC: 136 MG/DL
TRIGL SERPL-MCNC: 106 MG/DL

## 2022-06-02 PROCEDURE — 82947 ASSAY GLUCOSE BLOOD QUANT: CPT

## 2022-06-02 PROCEDURE — 80061 LIPID PANEL: CPT

## 2022-06-02 PROCEDURE — 36415 COLL VENOUS BLD VENIPUNCTURE: CPT

## 2022-08-01 ENCOUNTER — MYC REFILL (OUTPATIENT)
Dept: FAMILY MEDICINE | Facility: OTHER | Age: 32
End: 2022-08-01

## 2022-08-01 DIAGNOSIS — G43.009 MIGRAINE WITHOUT AURA AND WITHOUT STATUS MIGRAINOSUS, NOT INTRACTABLE: ICD-10-CM

## 2022-08-03 RX ORDER — SUMATRIPTAN 25 MG/1
25 TABLET, FILM COATED ORAL
Qty: 30 TABLET | Refills: 0 | Status: SHIPPED | OUTPATIENT
Start: 2022-08-03 | End: 2023-05-26

## 2022-09-13 ENCOUNTER — TELEPHONE (OUTPATIENT)
Dept: TRANSPLANT | Facility: CLINIC | Age: 32
End: 2022-09-13

## 2022-09-13 ENCOUNTER — DOCUMENTATION ONLY (OUTPATIENT)
Dept: TRANSPLANT | Facility: CLINIC | Age: 32
End: 2022-09-13

## 2022-09-13 NOTE — TELEPHONE ENCOUNTER
"SSN: [id number]  Voucher: Opted-In Registered As: Family Voucher Donor  Donor Intake Start: 22 Donor Intake Complete: 22  Gender: Female Preferred Language: English  Full Name: Lizzeth Alarcon Is  Needed: [not answered]  E-mail: [email or web address] Phone Number: [phone]  Secondary Phone:  Contact Preference: [not answered] Best Contact Time: 9am - 5pm  Emergency Contact: Melody Alarcon Emergency Contact #: [phone]  Relationship to Contact: Contact is my sibling  : 89 Age: 32  Address: [address] [place] [address] City: [place]  State: [place] Postal Code: [id number]  Height: 5'7\" Weight: 165lbs  BMI: 25.8  Employment Status: Employed Has PTO for donation? Yes, employer reimbursed  (addt'l to vacation)  Occupation: Sales Requires Heavy Lifting? No  Education Level: Advanced Degree Marital Status: Single  Exercise Routine: 2-3/Week Health Insurance: Yes  Blood Type: O Ethnicity/Race: White  Donor Type: Family Voucher Donor  Prefer Remote Donation: [not answered]  Physician: Rita Blum<br/>[place], [place]  Motivation to donate: Help save someone s life!  Living Donor Pre-Screening  Is In [place]  Will accept blood transfusions? Yes  Has been diagnosed with kidney disease? No  Has had a heart attack? No  Has Diabetes (High BGs)? No  Has had cancer? No  Has had kidney stones? No  Has ever been pregnant? No  Is Planning on Pregnancy? No  Is Taking Birth Control? Yes   - Birth Control Months? 120   - BirthControlForm? Pill   - Birth Control Complications? No   - Is Able To Stop Birth Control? Yes  Has Used Tobacco? No  Has HIV? No  Is Currently Incarcerated? No  Is Currently Residing in [place]  History Misc  Has Allergies? No  Has had Surgeries? No  Takes Medication? Yes  Medication Dose Frequency  Advil  Medical History  History of High BP? Never  History of CABG (bypass surgery)? No  History of blood clots? Never  History of coronary disease? Never  History of high cholesterol or " triglycerides? Never  Has stents implanted? No  History of chest pain during exercise? No  History of chest pain at other times? No  Results of climbing 2 flights of stairs? No Problem  Had stress test in last year? No  Has had stroke? No  Has had leg bypass? No  History of lung disease? Never  History of COPD? Never  History of TB? Still being treated  History of Pneumonia? Never  Has respiratory issues? Yes   - respiratory issues? Exercise induced asthma  Has HIV? No  Has Gastro Issues? No  History of Gallstones? Never  History of Pancreatitis? Never  History of Liver Disease? Never  History of Hepatitis B? Still being treated  History of Hepatitis C? Never  History of bleeding problems? Never  History of UTIs? No  History of kidney damage? Never  History of Proteinuria? Never  History of Hematuria? Never  History of neuro disease? Never  History of seizure? Never  History of lupus? Never  History of paralysis? Never  History of arthritis? Never  History of neuropathy? Never  History of depression? Never  History of anxiety? Never  History of documented psychiatric illness? Never  History of Fibroid Uterus? Never  History of Endometriosis? Never  History of Polycystic Ovaries? Never  Has had Miscarriages? No  Has had abortions? No  Has had transfusions? No  History of Obesity? No  History of Fabry's Disease? No  History of Sickle Cell Disease? No  History of Sickle Cell Trait? No  History of Sarcoidosis? No  Has auto-immune disease? No  Has had Physical Exam? Yes   - how many years ago: 1  Has had Mammogram? No  Has had Pap Smear? Yes   - how many years ago: 2  Colonoscopy? No  Medical history comments? [no comments]  Living Donor Family Medical History  Anyone with kidney disease? No  Anyone with liver disease? No  Anyone with heart disease? No  Anyone with coronary artery disease? No  Anyone with high blood pressure? Yes   - which family members: Sister  Anyone with blood disorder? No  Anyone with cancer? Yes    - which family members: Aunt  Anyone with kidney cancer? No  Anyone with diabetes? No  Is mother alive? Yes  Mother's age? 58  Is father alive? Yes  Father's age? 59  How many siblings? 3  How many adult children? 0  How many children under 18? 0  Social History  Has Used Alcohol? Yes   - currently uses alcohol: Yes   - how much: 1/Daily  Has Abused Alcohol? No  Has Used Drugs? No  Has had legal issues w/ law enforcement? No  Traveled over 100 miles from home in last year? Yes   - Traveled Where? [place]  Has had suicidal thoughts or attempts in the last five years? No

## 2022-09-15 ENCOUNTER — DOCUMENTATION ONLY (OUTPATIENT)
Dept: TRANSPLANT | Facility: CLINIC | Age: 32
End: 2022-09-15

## 2022-09-15 NOTE — PROGRESS NOTES
Lizzeth liebermanq a cancel of today's VICTORIANO appt and wanted to resched tomorrow.Appt is 9/16 at 2pm.

## 2022-09-16 ENCOUNTER — TELEPHONE (OUTPATIENT)
Dept: TRANSPLANT | Facility: CLINIC | Age: 32
End: 2022-09-16

## 2022-09-16 NOTE — TELEPHONE ENCOUNTER
Initial Independent Living Donor Advocate contact made with potential donor today.  I introduced myself and my role during the donation process, includin.  VICTORIANO ROLE   The federal government requires that all licensed transplant centers provide the living donor with an Independent Living Donor Advocate (VICTORIANO).  I do not meet recipients or attend meetings that discuss their care or decision to transplant them. My role is separate to avoid any conflict of interest.  My role is to ensure:  1) your rights are protected;  2) you get all the information you need from the transplant team to make a fully informed decision whether to donate;   3) that living donation is in your best interest.   4) that you have the right to decide NOT to go forward with living donation at any time during this process.  I am available to you throughout the workup, during surgery phase and follow-up at home.   2. WORKUP & PRIVACY     Your identity and workup are not shared with the recipient at any time.     There is a medical donor workup that consists of testing to determine if you are healthy enough to donate.  Workup tests include many blood draws, urine collection/ (kidney function testing), chest x-ray, EKG, CT scan. As you complete each step then you may move on to the next.  Workup can take as little or as long as you need and you can stop the process at any time.     Transplant is a treatment option, not a cure. A kidney from a living kidney donor can last 12-14 years.  Other treatment options are  donation and two types of dialysis.     This is major surgery and your estimated hospital stay is approximately 1-2 nights.  After surgery, there are driving and lifting restrictions - no driving for two weeks and no lifting over ten pounds for 8 weeks.  Donors are routinely off from work for 4 - 6 weeks after surgery, and potentially longer if they have a physical job.       If you anticipate lost wages due to donation, donor  wage reimbursement options may be available to you and will be reviewed with you during the evaluation process.      The recipient's insurance covers the medical expenses related to the donor evaluation and surgery.  However, it is important for you to carry your own health insurance to address any medical issues that are found and are NOT related to living donation.  3.  QUESTIONS  Have you received a packet from the transplant department?     Questions?    Have you discussed with anyone your potential decision to donate?   Yes.  Is anyone pressuring or coercing you to donate? No.  Have you discussed any financial arrangements with recipient around donating a kidney? No.  Are you aware that you can confidentially opt out at any time, up to and including day of donation? Yes.  At this time, would you like to proceed with the medical evaluation to see if you can be a kidney donor?  Yes.    If yes, the donor coordinator will be reaching out to you with next steps.     You can reach me or someone else on the VICTORIANO team by calling [phone] Option 3.  3  VICTORIANO NOTES: Lizzeth wants to be a NDD.  Her donor nurse coordinator is Charo Rivas RN and she is scheduled to talk to her on Sept 26 at 10 am    Duration of call 35 minutes

## 2022-09-26 NOTE — TELEPHONE ENCOUNTER
Contacted Lizzeth Alarcon to introduce myself and my role, review of medical/surgical/family history and next steps.     Lizzeth Alarcon  is aware She can stop donor evaluation at any time.    Have you ever been positive for COVID 19? No    Have you received the COVID vaccination series? Yes     Reviewed risk of COVID-19 to living donors.    Regular blood donor? Yes Last blood donation date 4 or 5 months ago (Notified donor to avoid blood donation at this time to get accurate blood counts if going through evaluation)     Lizzeth Alarcon is a 32 year old female  ABO O that would like to learn more about being a non directed donor.     Concerns from medical/surgical/family history: Childhood exercise induced asthma- no longer an issue, Migraines (1-2 per month), on aldactone for acne.     Current medications and NSAID use: Yes she does use advil    Legal issues w/ law enforcement: no    Reviewed any history of travel in endemic areas: [place], [place]  Strongyloides- [place], [place] and [place].  Trypanosoma cruzi ([place])- [place]  West Nile Virus- [place], [place], [place], [place] and [place].     Per our Phase 1 algorithm, does not meet criteria to do preliminary testing.     Reviewed evaluation testing: Iohexol, Lab work, CXR, EKG, Provider visits and functions, CT Angiogram.     Reviewed operations of selection committee and angio review meetings and the need for multidisciplinary input. Post-donation requirements include post-op appointment with your surgeon at 2 weeks after surgery, 6 week, 6 month, 1 year and 2 year lab tests.     Reviewed NKR listing and transfer of care to KP team if approved. Provided Lizzeth with NKR website to review.     Briefly went over options if approved of NDD and voucher donation.     Lizzeth would like to proceed with next steps: evaluation on 10/28/22.      Confirmed that Lizzeth reviewed Informed consent document and all questions answered.   Reviewed that they will receive [employer] to obtain electronic signature for the following: Informed consent, SRTR data, BRANDYN for medical information, Auth for Electronic communication and will need their signed consent back before proceeding with evaluation.      Encouraged sign up for [employer] and reviewed importance of watching teaching videos prior to evaluation.     Verified recipient status if not NDD.    Donor timeline: TBD     Will send orders to scheduling team to set up for evaluation testing.

## 2022-09-27 DIAGNOSIS — Z00.5 TRANSPLANT DONOR EVALUATION: Primary | ICD-10-CM

## 2022-09-27 RX ORDER — DIPHENHYDRAMINE HYDROCHLORIDE 50 MG/ML
50 INJECTION INTRAMUSCULAR; INTRAVENOUS
Status: CANCELLED
Start: 2022-10-28

## 2022-09-27 RX ORDER — EPINEPHRINE 1 MG/ML
0.3 INJECTION, SOLUTION, CONCENTRATE INTRAVENOUS EVERY 5 MIN PRN
Status: CANCELLED | OUTPATIENT
Start: 2022-10-28

## 2022-09-27 RX ORDER — ALBUTEROL SULFATE 0.83 MG/ML
2.5 SOLUTION RESPIRATORY (INHALATION)
Status: CANCELLED | OUTPATIENT
Start: 2022-10-28

## 2022-09-27 RX ORDER — METHYLPREDNISOLONE SODIUM SUCCINATE 125 MG/2ML
125 INJECTION, POWDER, LYOPHILIZED, FOR SOLUTION INTRAMUSCULAR; INTRAVENOUS
Status: CANCELLED
Start: 2022-10-28

## 2022-09-27 RX ORDER — MEPERIDINE HYDROCHLORIDE 25 MG/ML
25 INJECTION INTRAMUSCULAR; INTRAVENOUS; SUBCUTANEOUS EVERY 30 MIN PRN
Status: CANCELLED | OUTPATIENT
Start: 2022-10-28

## 2022-09-27 RX ORDER — ALBUTEROL SULFATE 90 UG/1
1-2 AEROSOL, METERED RESPIRATORY (INHALATION)
Status: CANCELLED
Start: 2022-10-28

## 2022-10-06 DIAGNOSIS — Z00.5 TRANSPLANT DONOR EVALUATION: ICD-10-CM

## 2022-10-27 LAB
ABO/RH(D): NORMAL
ANTIBODY SCREEN: NEGATIVE
SPECIMEN EXPIRATION DATE: NORMAL

## 2022-10-28 ENCOUNTER — LAB (OUTPATIENT)
Dept: LAB | Facility: CLINIC | Age: 32
End: 2022-10-28
Attending: INTERNAL MEDICINE
Payer: COMMERCIAL

## 2022-10-28 ENCOUNTER — ALLIED HEALTH/NURSE VISIT (OUTPATIENT)
Dept: TRANSPLANT | Facility: CLINIC | Age: 32
End: 2022-10-28
Attending: INTERNAL MEDICINE

## 2022-10-28 ENCOUNTER — OFFICE VISIT (OUTPATIENT)
Dept: NEPHROLOGY | Facility: CLINIC | Age: 32
End: 2022-10-28
Attending: INTERNAL MEDICINE

## 2022-10-28 ENCOUNTER — APPOINTMENT (OUTPATIENT)
Dept: TRANSPLANT | Facility: CLINIC | Age: 32
End: 2022-10-28
Attending: INTERNAL MEDICINE

## 2022-10-28 ENCOUNTER — ANCILLARY PROCEDURE (OUTPATIENT)
Dept: CT IMAGING | Facility: CLINIC | Age: 32
End: 2022-10-28
Attending: INTERNAL MEDICINE
Payer: COMMERCIAL

## 2022-10-28 ENCOUNTER — OFFICE VISIT (OUTPATIENT)
Dept: INFUSION THERAPY | Facility: CLINIC | Age: 32
End: 2022-10-28
Attending: INTERNAL MEDICINE
Payer: COMMERCIAL

## 2022-10-28 ENCOUNTER — ANCILLARY PROCEDURE (OUTPATIENT)
Dept: GENERAL RADIOLOGY | Facility: CLINIC | Age: 32
End: 2022-10-28
Attending: INTERNAL MEDICINE
Payer: COMMERCIAL

## 2022-10-28 ENCOUNTER — OFFICE VISIT (OUTPATIENT)
Dept: TRANSPLANT | Facility: CLINIC | Age: 32
End: 2022-10-28
Attending: INTERNAL MEDICINE
Payer: COMMERCIAL

## 2022-10-28 ENCOUNTER — DOCUMENTATION ONLY (OUTPATIENT)
Dept: TRANSPLANT | Facility: CLINIC | Age: 32
End: 2022-10-28

## 2022-10-28 VITALS
DIASTOLIC BLOOD PRESSURE: 84 MMHG | SYSTOLIC BLOOD PRESSURE: 122 MMHG | HEIGHT: 67 IN | HEART RATE: 89 BPM | WEIGHT: 174 LBS | OXYGEN SATURATION: 100 % | BODY MASS INDEX: 27.31 KG/M2

## 2022-10-28 VITALS
OXYGEN SATURATION: 99 % | DIASTOLIC BLOOD PRESSURE: 83 MMHG | HEIGHT: 67 IN | SYSTOLIC BLOOD PRESSURE: 134 MMHG | RESPIRATION RATE: 16 BRPM | TEMPERATURE: 98.1 F | HEART RATE: 86 BPM | WEIGHT: 174.8 LBS | BODY MASS INDEX: 27.44 KG/M2

## 2022-10-28 VITALS
HEART RATE: 89 BPM | SYSTOLIC BLOOD PRESSURE: 122 MMHG | HEIGHT: 67 IN | OXYGEN SATURATION: 100 % | BODY MASS INDEX: 27.31 KG/M2 | WEIGHT: 174 LBS | DIASTOLIC BLOOD PRESSURE: 84 MMHG

## 2022-10-28 DIAGNOSIS — Z00.5 TRANSPLANT DONOR EVALUATION: ICD-10-CM

## 2022-10-28 DIAGNOSIS — Z00.5 TRANSPLANT DONOR EVALUATION: Primary | ICD-10-CM

## 2022-10-28 LAB
ABO/RH(D): NORMAL
ALBUMIN MFR UR ELPH: <6 MG/DL
ALBUMIN SERPL BCG-MCNC: 4.5 G/DL (ref 3.5–5.2)
ALBUMIN UR-MCNC: NEGATIVE MG/DL
ALP SERPL-CCNC: 55 U/L (ref 35–104)
ALT SERPL W P-5'-P-CCNC: 32 U/L (ref 10–35)
ANION GAP SERPL CALCULATED.3IONS-SCNC: 11 MMOL/L (ref 7–15)
APPEARANCE UR: CLEAR
APTT PPP: 27 SECONDS (ref 22–38)
AST SERPL W P-5'-P-CCNC: 40 U/L (ref 10–35)
BILIRUB SERPL-MCNC: 0.2 MG/DL
BILIRUB UR QL STRIP: NEGATIVE
BUN SERPL-MCNC: 7.3 MG/DL (ref 6–20)
CALCIUM SERPL-MCNC: 9.9 MG/DL (ref 8.6–10)
CHLORIDE SERPL-SCNC: 101 MMOL/L (ref 98–107)
CHOLEST SERPL-MCNC: 226 MG/DL
CMV IGG SERPL IA-ACNC: 3.6 U/ML
CMV IGG SERPL IA-ACNC: ABNORMAL
COLOR UR AUTO: NORMAL
CREAT SERPL-MCNC: 0.94 MG/DL (ref 0.51–0.95)
CREAT UR-MCNC: 30.2 MG/DL
CREAT UR-MCNC: 30.6 MG/DL
DEPRECATED HCO3 PLAS-SCNC: 25 MMOL/L (ref 22–29)
EBV VCA IGG SER IA-ACNC: <10 U/ML
EBV VCA IGG SER IA-ACNC: NORMAL
ERYTHROCYTE [DISTWIDTH] IN BLOOD BY AUTOMATED COUNT: 14.2 % (ref 10–15)
GFR SERPL CREATININE-BSD FRML MDRD: 82 ML/MIN/1.73M2
GLUCOSE SERPL-MCNC: 86 MG/DL (ref 70–99)
GLUCOSE UR STRIP-MCNC: NEGATIVE MG/DL
HBA1C MFR BLD: 5.2 %
HBV CORE AB SERPL QL IA: NONREACTIVE
HBV SURFACE AB SERPL IA-ACNC: 74.92 M[IU]/ML
HBV SURFACE AB SERPL IA-ACNC: REACTIVE M[IU]/ML
HBV SURFACE AG SERPL QL IA: NONREACTIVE
HCG INTACT+B SERPL-ACNC: <1 MIU/ML
HCT VFR BLD AUTO: 41 % (ref 35–47)
HCV AB SERPL QL IA: NONREACTIVE
HDLC SERPL-MCNC: 62 MG/DL
HGB BLD-MCNC: 13.7 G/DL (ref 11.7–15.7)
HGB UR QL STRIP: NEGATIVE
HIV 1+2 AB+HIV1 P24 AG SERPL QL IA: NONREACTIVE
INR PPP: 0.96 (ref 0.85–1.15)
KETONES UR STRIP-MCNC: NEGATIVE MG/DL
LDLC SERPL CALC-MCNC: 145 MG/DL
LEUKOCYTE ESTERASE UR QL STRIP: NEGATIVE
MCH RBC QN AUTO: 30.2 PG (ref 26.5–33)
MCHC RBC AUTO-ENTMCNC: 33.4 G/DL (ref 31.5–36.5)
MCV RBC AUTO: 91 FL (ref 78–100)
MICROALBUMIN UR-MCNC: <12 MG/L
MICROALBUMIN/CREAT UR: NORMAL MG/G{CREAT}
NITRATE UR QL: NEGATIVE
NONHDLC SERPL-MCNC: 164 MG/DL
PH UR STRIP: 6.5 [PH] (ref 5–7)
PHOSPHATE SERPL-MCNC: 4.3 MG/DL (ref 2.5–4.5)
PLATELET # BLD AUTO: 278 10E3/UL (ref 150–450)
POTASSIUM SERPL-SCNC: 4 MMOL/L (ref 3.4–5.3)
PROT SERPL-MCNC: 7.7 G/DL (ref 6.4–8.3)
PROT/CREAT 24H UR: NORMAL MG/G{CREAT}
RBC # BLD AUTO: 4.53 10E6/UL (ref 3.8–5.2)
RBC URINE: 1 /HPF
SODIUM SERPL-SCNC: 137 MMOL/L (ref 136–145)
SP GR UR STRIP: 1 (ref 1–1.03)
SPECIMEN EXPIRATION DATE: NORMAL
SQUAMOUS EPITHELIAL: 1 /HPF
T PALLIDUM AB SER QL: NONREACTIVE
TRIGL SERPL-MCNC: 96 MG/DL
URATE SERPL-MCNC: 4.7 MG/DL (ref 2.4–5.7)
UROBILINOGEN UR STRIP-MCNC: NORMAL MG/DL
WBC # BLD AUTO: 8 10E3/UL (ref 4–11)
WBC URINE: 1 /HPF

## 2022-10-28 PROCEDURE — 86850 RBC ANTIBODY SCREEN: CPT

## 2022-10-28 PROCEDURE — 99205 OFFICE O/P NEW HI 60 MIN: CPT | Performed by: INTERNAL MEDICINE

## 2022-10-28 PROCEDURE — 85014 HEMATOCRIT: CPT

## 2022-10-28 PROCEDURE — 255N000002 HC RX 255 OP 636: Performed by: INTERNAL MEDICINE

## 2022-10-28 PROCEDURE — 83036 HEMOGLOBIN GLYCOSYLATED A1C: CPT

## 2022-10-28 PROCEDURE — 99205 OFFICE O/P NEW HI 60 MIN: CPT | Performed by: SURGERY

## 2022-10-28 PROCEDURE — 85610 PROTHROMBIN TIME: CPT

## 2022-10-28 PROCEDURE — 84550 ASSAY OF BLOOD/URIC ACID: CPT

## 2022-10-28 PROCEDURE — 81382 HLA II TYPING 1 LOC HR: CPT

## 2022-10-28 PROCEDURE — 82043 UR ALBUMIN QUANTITATIVE: CPT

## 2022-10-28 PROCEDURE — 86780 TREPONEMA PALLIDUM: CPT

## 2022-10-28 PROCEDURE — 99207 PR NO CHARGE COORDINATED CARE PS: CPT

## 2022-10-28 PROCEDURE — 74175 CTA ABDOMEN W/CONTRAST: CPT | Mod: GC | Performed by: RADIOLOGY

## 2022-10-28 PROCEDURE — 84100 ASSAY OF PHOSPHORUS: CPT

## 2022-10-28 PROCEDURE — 80061 LIPID PANEL: CPT

## 2022-10-28 PROCEDURE — 86644 CMV ANTIBODY: CPT

## 2022-10-28 PROCEDURE — 71046 X-RAY EXAM CHEST 2 VIEWS: CPT | Performed by: RADIOLOGY

## 2022-10-28 PROCEDURE — 85730 THROMBOPLASTIN TIME PARTIAL: CPT

## 2022-10-28 PROCEDURE — 86704 HEP B CORE ANTIBODY TOTAL: CPT

## 2022-10-28 PROCEDURE — 86481 TB AG RESPONSE T-CELL SUSP: CPT

## 2022-10-28 PROCEDURE — 87340 HEPATITIS B SURFACE AG IA: CPT

## 2022-10-28 PROCEDURE — 93000 ELECTROCARDIOGRAM COMPLETE: CPT | Performed by: INTERNAL MEDICINE

## 2022-10-28 PROCEDURE — 36415 COLL VENOUS BLD VENIPUNCTURE: CPT

## 2022-10-28 PROCEDURE — 84156 ASSAY OF PROTEIN URINE: CPT

## 2022-10-28 PROCEDURE — 86901 BLOOD TYPING SEROLOGIC RH(D): CPT

## 2022-10-28 PROCEDURE — 36415 COLL VENOUS BLD VENIPUNCTURE: CPT | Performed by: INTERNAL MEDICINE

## 2022-10-28 PROCEDURE — 86706 HEP B SURFACE ANTIBODY: CPT

## 2022-10-28 PROCEDURE — 81001 URINALYSIS AUTO W/SCOPE: CPT

## 2022-10-28 PROCEDURE — 82542 COL CHROMOTOGRAPHY QUAL/QUAN: CPT | Performed by: INTERNAL MEDICINE

## 2022-10-28 PROCEDURE — 86665 EPSTEIN-BARR CAPSID VCA: CPT

## 2022-10-28 PROCEDURE — 80053 COMPREHEN METABOLIC PANEL: CPT

## 2022-10-28 PROCEDURE — 86789 WEST NILE VIRUS ANTIBODY: CPT

## 2022-10-28 PROCEDURE — 84702 CHORIONIC GONADOTROPIN TEST: CPT

## 2022-10-28 PROCEDURE — 86803 HEPATITIS C AB TEST: CPT

## 2022-10-28 RX ORDER — EPINEPHRINE 1 MG/ML
0.3 INJECTION, SOLUTION INTRAMUSCULAR; SUBCUTANEOUS EVERY 5 MIN PRN
Status: CANCELLED | OUTPATIENT
Start: 2022-10-28

## 2022-10-28 RX ORDER — METHYLPREDNISOLONE SODIUM SUCCINATE 125 MG/2ML
125 INJECTION, POWDER, LYOPHILIZED, FOR SOLUTION INTRAMUSCULAR; INTRAVENOUS
Status: CANCELLED
Start: 2022-10-28

## 2022-10-28 RX ORDER — MEPERIDINE HYDROCHLORIDE 25 MG/ML
25 INJECTION INTRAMUSCULAR; INTRAVENOUS; SUBCUTANEOUS EVERY 30 MIN PRN
Status: CANCELLED | OUTPATIENT
Start: 2022-10-28

## 2022-10-28 RX ORDER — DIPHENHYDRAMINE HYDROCHLORIDE 50 MG/ML
50 INJECTION INTRAMUSCULAR; INTRAVENOUS
Status: CANCELLED
Start: 2022-10-28

## 2022-10-28 RX ORDER — ALBUTEROL SULFATE 90 UG/1
1-2 AEROSOL, METERED RESPIRATORY (INHALATION)
Status: CANCELLED
Start: 2022-10-28

## 2022-10-28 RX ORDER — ALBUTEROL SULFATE 0.83 MG/ML
2.5 SOLUTION RESPIRATORY (INHALATION)
Status: CANCELLED | OUTPATIENT
Start: 2022-10-28

## 2022-10-28 RX ORDER — IOPAMIDOL 755 MG/ML
100 INJECTION, SOLUTION INTRAVASCULAR ONCE
Status: COMPLETED | OUTPATIENT
Start: 2022-10-28 | End: 2022-10-28

## 2022-10-28 RX ADMIN — IOHEXOL 4 ML: 350 INJECTION, SOLUTION INTRAVENOUS at 07:50

## 2022-10-28 RX ADMIN — IOPAMIDOL 100 ML: 755 INJECTION, SOLUTION INTRAVASCULAR at 12:39

## 2022-10-28 NOTE — NURSING NOTE
Blood was again, unsuccessfully drawn at 11:55 AM from the Iohexol line put in at the [facility] infusion center at 7:52 AM    First attempt at redrawn at another site was unsuccessful and and other staff came in and was successful at another different site.    Madeline Dyer, CMA

## 2022-10-28 NOTE — PROGRESS NOTES
Transplant Surgery Consult Note    Medical record number: [id number]  YOB: 1990,   Consult requested by the patient for evaluation of kidney donation candidacy.    Assessment and Recommendations: Ms. Alarcon appears to be a good candidate for kidney donation at this point in the evaluation. The following issues will need to be addressed prior to formal review:    Iohexol ordered for today for assessment of adequate kidney function for donation. Will be reviewed when resulted  Donor labs ordered for today and reviewed to include: CBC, CMP, Mg, PO4, hemoglobin A1c, lipid panel, blood type x 2, hemoglobin A1c, UA with microscopic, urine culture, urine protein/cr, INR/PTT, Quantiferon gold, hepatitis C (antibody), hepatitis B panel, HIV, treponemia, West Nile (antibody panel), CMV (antibody panel), EBV (antibody panel), pregnancy screen (for females of childbearing age), PSA (males >50yrs), HLA tissue typing, buccal swab for eplet typing  Social work consult ordered  Dietician consult ordered  Transplant nephrology consult ordered  Transplant coordinator consult ordered  VICTORIANO (independent living donor advovate) consult ordered  EKG ordered for today and will be reviewed when resulted. Suitable to proceed today with this testing today:  Yes   Chest x-ray ordered for today and will be reviewed when resulted. Suitable to proceed with this testing today:  Yes   CT angio of abdomen and pelvis for anatomical assessment. Images and report to be reviewed when resulted.  Suitable to proceed with this testing today:  Yes  After review of the above, additional testing/concerns include:    Repeat LFT's due to elevated AST. Discussed alcohol and she drinks approximately 5 drinks per week  Literature about pregnancy after donation to be given  Will need to hold spironolactone around time of donation to prevent dehydration  Had covid 2 weeks ago and feels fully recovered. Was fully vaccinated and boosted with new  bivalent vaccine    The majority of our visit today was spent in counseling regarding the medical and surgical risks of kidney donation, the typical gerson-and post-operative experience and recovery/return to work pattern.  Surgical risks may be transient or permanent and can include but not limited to decreased kidney function or acute kidney failure and the need for dialysis or kidney transplant for the living donor in the immediate post-operative period. We also talked about post-op visits and longer term health care maintenance, as well as the implications of having one remaining kidney. This discussion included, but was not limited to rates of complications such as bleeding, infection, need for transfusion, reoperation, other organ injury, future bowel obstruction, incisional hernia, port site pain, varicocele, venous thrombosis, pulmonary embolism, renal failure, and death (3 per 10,000). The patient understands that if end stage renal failure happens that dialysis or transplant would be required. For female donor of child bearing age, the risks of preeclampsi or gestational hypertension during pregnancies after donation were discussed .  At the conclusion of the visit, all questions had been answered and Ms. Alarcon's candidacy for donation will be reviewed at our Multidisciplinary Donor Selection Committee. She will stay in contact with the nurse coordinator with any concerns.      Total time: 60 minutes        Tawanna Macdonald MD FACS  Assistant Professor of Surgery  Director, Living Kidney Donor Program.  ---------------------------------------------------------------------------------------------------    HPI: Lizzeth Alarcon is a 32 year old year old female who presents for a kidney donor evaluation.  Patient would like to donate as a non-directed donor.  Has been researching this for years. She got the idea when she had a friend whose brother needed a kidney. She donates blood on a regular basis.       Personal history of:   No    Yes  Cancer:    [x]      []             Comment:     Diabetes   [x]      []  Comment:    Thombosis   [x]      []  Comment:       Hepatitis   [x]      []  Comment:    Tuberculosis   [x]      []  Comment:   Back or neck pain:  [x]      []  Comment:      Kidney stones   [x]      []  Comment:                  Kidney infections  [x]      []  Comment:           Urinary retention  [x]      []            Comment:   Regular NSAID use:  [x]      []            Comment:      Constipation:   [x]      []            Comment:      Religion  [x]      []            Comment:      Other:    []      [x]            Comment:  Spironolactone for acne       Past Medical History:   Diagnosis Date     Acne      Closed fracture of olecranon process of ulna 4/2006    left olecranon fracture     Contraceptive management 2008     Exercise-induced asthma      Exercise-induced asthma      Past Surgical History:   Procedure Laterality Date     NO HISTORY OF SURGERY       Family History   Problem Relation Age of Onset     Thyroid Disease Mother      Depression Mother      Mental Illness Mother      Respiratory Father         Asthma     Respiratory Brother         Exercised induced Asthma     Breast Cancer Other      Colon Cancer No family hx of      Diabetes No family hx of      Myocardial Infarction No family hx of      Melanoma No family hx of      Skin Cancer No family hx of      Social History     Socioeconomic History     Marital status: Single     Spouse name: single     Number of children: 0     Years of education: Not on file     Highest education level: Not on file   Occupational History     Occupation: marketing   Tobacco Use     Smoking status: Never     Smokeless tobacco: Never     Tobacco comments:     no second hand smoke   Substance and Sexual Activity     Alcohol use: Yes     Comment: 3-4/week     Drug use: No     Sexual activity: Never   Other Topics Concern     Parent/sibling w/ CABG, MI or  angioplasty before 65F 55M? No   Social History Narrative     Not on file     Social Determinants of Health     Financial Resource Strain: Not on file   Food Insecurity: Not on file   Transportation Needs: Not on file   Physical Activity: Not on file   Stress: Not on file   Social Connections: Not on file   Intimate Partner Violence: Not on file   Housing Stability: Not on file       ROS:   CONSTITUTIONAL:  No fevers or chills  EYES: negative for icterus  ENT:  negative for hearing loss, tinnitus and sore throat  RESPIRATORY:  negative for cough, sputum, dyspnea  CARDIOVASCULAR:  negative for chest pain  GASTROINTESTINAL:  negative for nausea, vomiting, diarrhea or constipation  GENITOURINARY:  negative for incontinence, dysuria, bladder emptying problems  HEME:  No easy bruising  INTEGUMENT:  negative for rash and pruritus  NEURO:  Negative for headache, seizure disorder    Allergies:   Allergies   Allergen Reactions     Minocycline      Joint pains       Medications:  Prescription Medications as of 10/28/2022       Rx Number Disp Refills Start End Last Dispensed Date Next Fill Date Owning Pharmacy    ENSKYCE 0.15-30 MG-MCG tablet   0 5/19/2019    [facility] [place] [address] [facility]    Class: Historical    spironolactone (ALDACTONE) 100 MG tablet  180 tablet 2 8/4/2022    [facility] [address]    Sig: TAKE 2 TABLETS DAILY    Class: E-Prescribe    SUMAtriptan (IMITREX) 25 MG tablet  30 tablet 0 8/3/2022    [facility] [address]    Sig: Take 1 tablet (25 mg) by mouth at onset of headache for migraine May repeat in 2 hours. Max 4 tablets/24 hours.    Class: E-Prescribe    Route: Oral      Clinic-Administered Medications as of 10/28/2022       Dose Frequency Start End    iohexol (OMNIPAQUE) 350 MG/ML injectable solution 4 mL (Completed) 4 mL ONCE 10/28/2022 10/28/2022     Route: Intravenous          Exam:   Temp:  [98.1  F (36.7  C)] 98.1  F (36.7  C)  Pulse:  [86-89] 89  Resp:  [16] 16  BP: (118-134)/(78-84) 122/84  SpO2:  [99 %-100 %] 100 %  Body mass index is 27.31 kg/m .  Temp:  [98.1  F (36.7  C)] 98.1  F (36.7  C)  Pulse:  [86-89] 89  Resp:  [16] 16  BP: (118-134)/(78-84) 122/84  SpO2:  [99 %-100 %] 100 %  Appearance: in no apparent distress.   Skin: normal  Head and Neck: Normal, no rashes or jaundice  Respiratory: normal respiratory excursions, no audible wheeze  Cardiovascular: RRR  Abdomen: flat, No distention and No surgical scars   Extremeties: Edema, none  Neuro: without deficit       Diagnostics:   Recent Results (from the past 336 hour(s))   HCG quantitative pregnancy    Collection Time: 10/28/22  7:32 AM   Result Value Ref Range    hCG Quantitative <1 <5 mIU/mL   CBC with platelets    Collection Time: 10/28/22  7:32 AM   Result Value Ref Range    WBC Count 8.0 4.0 - 11.0 10e3/uL    RBC Count 4.53 3.80 - 5.20 10e6/uL    Hemoglobin 13.7 11.7 - 15.7 g/dL    Hematocrit 41.0 35.0 - 47.0 %    MCV 91 78 - 100 fL    MCH 30.2 26.5 - 33.0 pg    MCHC 33.4 31.5 - 36.5 g/dL    RDW 14.2 10.0 - 15.0 %    Platelet Count 278 150 - 450 10e3/uL   Routine UA with microscopic    Collection Time: 10/28/22  7:32 AM   Result Value Ref Range    Color Urine Straw Colorless, Straw, Light Yellow, Yellow    Appearance Urine Clear Clear    Glucose Urine Negative Negative mg/dL    Bilirubin Urine Negative Negative    Ketones Urine Negative Negative mg/dL    Specific Gravity Urine 1.005 1.003 - 1.035    Blood Urine Negative Negative    pH Urine 6.5 5.0 - 7.0    Protein Albumin Urine Negative Negative mg/dL    Urobilinogen Urine Normal Normal, 2.0 mg/dL    Nitrite Urine Negative Negative    Leukocyte Esterase Urine Negative Negative    RBC Urine 1 <=2 /HPF    WBC Urine 1 <=5 /HPF    Squamous Epithelials Urine 1 <=1 /HPF   Partial thromboplastin time    Collection Time: 10/28/22  7:32 AM   Result  Value Ref Range    aPTT 27 22 - 38 Seconds   INR    Collection Time: 10/28/22  7:32 AM   Result Value Ref Range    INR 0.96 0.85 - 1.15   Hemoglobin A1c    Collection Time: 10/28/22  7:32 AM   Result Value Ref Range    Hemoglobin A1C 5.2 <5.7 %   Phosphorus    Collection Time: 10/28/22  7:32 AM   Result Value Ref Range    Phosphorus 4.3 2.5 - 4.5 mg/dL   Comprehensive metabolic panel    Collection Time: 10/28/22  7:32 AM   Result Value Ref Range    Sodium 137 136 - 145 mmol/L    Potassium 4.0 3.4 - 5.3 mmol/L    Chloride 101 98 - 107 mmol/L    Carbon Dioxide (CO2) 25 22 - 29 mmol/L    Anion Gap 11 7 - 15 mmol/L    Urea Nitrogen 7.3 6.0 - 20.0 mg/dL    Creatinine 0.94 0.51 - 0.95 mg/dL    Calcium 9.9 8.6 - 10.0 mg/dL    Glucose 86 70 - 99 mg/dL    Alkaline Phosphatase 55 35 - 104 U/L    AST 40 (H) 10 - 35 U/L    ALT 32 10 - 35 U/L    Protein Total 7.7 6.4 - 8.3 g/dL    Albumin 4.5 3.5 - 5.2 g/dL    Bilirubin Total 0.2 <=1.2 mg/dL    GFR Estimate 82 >60 mL/min/1.73m2

## 2022-10-28 NOTE — PROGRESS NOTES
Psychosocial Evaluation  Living Organ Donation per OPTN Policy 14.1.A  Organ Type: living kidney donor (NDD)  Presenting Information: Lizzeth presents to the [facility] to complete a psychosocial evaluation since she is interested in becoming a non directed kidney donor.   PERSONAL BACKGROUND:  Current Living Situation: Lizzeth lives alone in [place], [place].     Education/Employment/Financial Situation: Lizzeth has a L[employer] and works for [employer] in [place] full time. She has worked there for 6 years. She has discussed her want to donate with her employer and they are supportive. She works from home and does not have strict PTO policies.     Health Insurance Status: BCBS through work     Family History: Lizzeth is single and does not have children.  She has 2 sisters and 1 brother. Parents are living.     General Health: Has PCP and goes yearly. Does not have an advanced directive.     Mental Health: The donor denies any past or present treatment for mental health issues, such as anxiety, depression, bipolar disorder, or disorders of thought such as schizophrenia or schizoaffective disorder.  There is no history of personality disorder or eating disorders.  The donor denies any past suicidal ideation, plans, or past attempts.  The donor denies any use of psychotropic medications at this time or in the past.  The donor denies any past history of hospitalization for psychiatric illnesses.  The donor denies any past history of ADHD or ADD.  The donor denies any history of educational issues or need for special educational services in their past history.    Of note, Lizzeth does see a therapist (Huyen herr [facility]) about one time per month to process work stress and relationships. She finds this very helpful.     Alcohol and Drug Use/Abuse/Dependency: Lizzeth reports that she consumes approximately 5-7 servings of alcohol per week ( a serving is  "defined here as one, 12 oz beer, or one 4 oz glass of wine, or one 1 /2 oz of hard liquor).  The donor denies any past history of abuse or dependency on alcohol or illicit drugs. The donor denies any current use of street drugs, including marijuana, vaping, edible marijuana, or other mood altering substances.  The donor denies any past history of negative consequences of use of alcohol or drugs such as a DUI, relationship problems, problems with fulfilling parenting or other care giving responsibilities, or problems with work performance.       Cigarette Use: none    Legal: none    Coping with major surgery/associated stress: walking, talking to sister, making plans to look forward to     Support System: her sister and friends will be her main supports post op. She has discussed her want to be a NDD and states that \"no one is surprised.\"     DONOR SPECIFIC INFORMATION:  Relationship to Recipient: NDD    Decision Process/Motivation to Donate:  Lizzeth reports that she knows a few distant acquaintances who have been living donors in the past. She has also seen a lot about living donation on social media. She is a an avid blood donor and states, \"why not\" donate. Lizzeth was somewhat vague about her reasons wanting to donate a kidney rather than help in other ways, but asked appropriate questions about the process.     High risk behaviors as defined by [place] Public Health Services (PHS) that have potential to increase risk of disease transmission were reviewed and no risks identified.     PREPARATION FOR DONATION, RECOVERY, AND POTENTIAL SHORT-LONG-TERM OUTCOMES:  Understanding of the Living Donation Process:  We discussed the role of living donor .  Short and long term medical and psychosocial risks to both, donor and recipient were reviewed and she expressed understanding.  Post surgical restrictions (2 weeks no driving, 6 weeks no lifting over 10 lbs) were reviewed and she appears capable of adhering to " the post surgical requirements. The need for a caregiver was discussed and she has a good care plan .  The risk of poor psychosocial outcome including problems with body image, post-surgery depression or anxiety, or feelings of emotional distress or bereavement if recipient experiences any recurrent disease, poor outcome or death was reviewed.  Additionally, potential financial implications, including the risk of having difficulty obtaining health care insurance, life insurance, disability insurance, or long term care insurance were reviewed, as were available donor grants to assist with donor related expenses.      We also discussed some unique issues that arise with paired kidney donation, which include the uncertainty of the timing and the importance of having a employment situation and support system that is able to provide sustained support and flexibility.    She appears capable of understanding this information and making an informed medical decision.    Impressions/Recommendations:   Lizzeth is motivated to donate a kidney to a stranger in need.  Her decision to donate is free of inducement, coercion, or other undue pressure.  Her housing, finances and employment are stable.  No current/active mental health or chemical abuse issues were identified, however, she should continue to engage in therapy.  The need for a caregiver was reviewed and she is able to identify a plan to meet her post operative care needs. She appears capable of making an informed medical decision.  No major psychosocial contraindications to living organ donation were identified, however, SW to discuss benefit of neurospsych testing with the team, as Lizzeth was somewhat unclear about her motivation to be a NDD with SW today.      Contact Information:   TAMERA [place], CCTSW   Living Donor   [facility], [facility], [facility]  Direct: [phone]  E-Mail:  [email or web address]      Time Spent: 40 minutes

## 2022-10-28 NOTE — LETTER
10/28/2022         RE: Lizzeth Alarcon  [address]  [facility] [address]        Dear Colleague,    Thank you for referring your patient, Lizzeth Alarcon, to the [facility]. Please see a copy of my visit note below.    Transplant Surgery Consult Note    Medical record number: [id number]  YOB: 1990,   Consult requested by the patient for evaluation of kidney donation candidacy.    Assessment and Recommendations: Ms. Alarcon appears to be a good candidate for kidney donation at this point in the evaluation. The following issues will need to be addressed prior to formal review:    Iohexol ordered for today for assessment of adequate kidney function for donation. Will be reviewed when resulted  Donor labs ordered for today and reviewed to include: CBC, CMP, Mg, PO4, hemoglobin A1c, lipid panel, blood type x 2, hemoglobin A1c, UA with microscopic, urine culture, urine protein/cr, INR/PTT, Quantiferon gold, hepatitis C (antibody), hepatitis B panel, HIV, treponemia, West Nile (antibody panel), CMV (antibody panel), EBV (antibody panel), pregnancy screen (for females of childbearing age), PSA (males >50yrs), HLA tissue typing, buccal swab for eplet typing  Social work consult ordered  Dietician consult ordered  Transplant nephrology consult ordered  Transplant coordinator consult ordered  VICTORIANO (independent living donor advovate) consult ordered  EKG ordered for today and will be reviewed when resulted. Suitable to proceed today with this testing today:  Yes   Chest x-ray ordered for today and will be reviewed when resulted. Suitable to proceed with this testing today:  Yes   CT angio of abdomen and pelvis for anatomical assessment. Images and report to be reviewed when resulted.  Suitable to proceed with this testing today:  Yes  After review of the above, additional testing/concerns include:    Repeat LFT's due to elevated AST. Discussed alcohol and she drinks  approximately 5 drinks per week  Literature about pregnancy after donation to be given  Will need to hold spironolactone around time of donation to prevent dehydration  Had covid 2 weeks ago and feels fully recovered. Was fully vaccinated and boosted with new bivalent vaccine    The majority of our visit today was spent in counseling regarding the medical and surgical risks of kidney donation, the typical gerson-and post-operative experience and recovery/return to work pattern.  Surgical risks may be transient or permanent and can include but not limited to decreased kidney function or acute kidney failure and the need for dialysis or kidney transplant for the living donor in the immediate post-operative period. We also talked about post-op visits and longer term health care maintenance, as well as the implications of having one remaining kidney. This discussion included, but was not limited to rates of complications such as bleeding, infection, need for transfusion, reoperation, other organ injury, future bowel obstruction, incisional hernia, port site pain, varicocele, venous thrombosis, pulmonary embolism, renal failure, and death (3 per 10,000). The patient understands that if end stage renal failure happens that dialysis or transplant would be required. For female donor of child bearing age, the risks of preeclampsi or gestational hypertension during pregnancies after donation were discussed .  At the conclusion of the visit, all questions had been answered and Ms. Alarcon's candidacy for donation will be reviewed at our Multidisciplinary Donor Selection Committee. She will stay in contact with the nurse coordinator with any concerns.      Total time: 60 minutes        Tawanna Macdonald MD FACS  Assistant Professor of Surgery  Director, Living Kidney Donor Program.  ---------------------------------------------------------------------------------------------------    HPI: Lizzeth Alarcon is a 32 year old  year old female who presents for a kidney donor evaluation.  Patient would like to donate as a non-directed donor.  Has been researching this for years. She got the idea when she had a friend whose brother needed a kidney. She donates blood on a regular basis.      Personal history of:   No    Yes  Cancer:    [x]      []             Comment:     Diabetes   [x]      []  Comment:    Thombosis   [x]      []  Comment:       Hepatitis   [x]      []  Comment:    Tuberculosis   [x]      []  Comment:   Back or neck pain:  [x]      []  Comment:      Kidney stones   [x]      []  Comment:                  Kidney infections  [x]      []  Comment:           Urinary retention  [x]      []            Comment:   Regular NSAID use:  [x]      []            Comment:      Constipation:   [x]      []            Comment:      Zoroastrianism  [x]      []            Comment:      Other:    []      [x]            Comment:  Spironolactone for acne       Past Medical History:   Diagnosis Date     Acne      Closed fracture of olecranon process of ulna 4/2006    left olecranon fracture     Contraceptive management 2008     Exercise-induced asthma      Exercise-induced asthma      Past Surgical History:   Procedure Laterality Date     NO HISTORY OF SURGERY       Family History   Problem Relation Age of Onset     Thyroid Disease Mother      Depression Mother      Mental Illness Mother      Respiratory Father         Asthma     Respiratory Brother         Exercised induced Asthma     Breast Cancer Other      Colon Cancer No family hx of      Diabetes No family hx of      Myocardial Infarction No family hx of      Melanoma No family hx of      Skin Cancer No family hx of      Social History     Socioeconomic History     Marital status: Single     Spouse name: single     Number of children: 0     Years of education: Not on file     Highest education level: Not on file   Occupational History     Occupation: marketing   Tobacco Use     Smoking  status: Never     Smokeless tobacco: Never     Tobacco comments:     no second hand smoke   Substance and Sexual Activity     Alcohol use: Yes     Comment: 3-4/week     Drug use: No     Sexual activity: Never   Other Topics Concern     Parent/sibling w/ CABG, MI or angioplasty before 65F 55M? No   Social History Narrative     Not on file     Social Determinants of Health     Financial Resource Strain: Not on file   Food Insecurity: Not on file   Transportation Needs: Not on file   Physical Activity: Not on file   Stress: Not on file   Social Connections: Not on file   Intimate Partner Violence: Not on file   Housing Stability: Not on file       ROS:   CONSTITUTIONAL:  No fevers or chills  EYES: negative for icterus  ENT:  negative for hearing loss, tinnitus and sore throat  RESPIRATORY:  negative for cough, sputum, dyspnea  CARDIOVASCULAR:  negative for chest pain  GASTROINTESTINAL:  negative for nausea, vomiting, diarrhea or constipation  GENITOURINARY:  negative for incontinence, dysuria, bladder emptying problems  HEME:  No easy bruising  INTEGUMENT:  negative for rash and pruritus  NEURO:  Negative for headache, seizure disorder    Allergies:   Allergies   Allergen Reactions     Minocycline      Joint pains       Medications:  Prescription Medications as of 10/28/2022       Rx Number Disp Refills Start End Last Dispensed Date Next Fill Date Owning Pharmacy    ENSKYCE 0.15-30 MG-MCG tablet   0 5/19/2019    [id number] [place] [address] AT [facility]    Class: Historical    spironolactone (ALDACTONE) 100 MG tablet  180 tablet 2 8/4/2022    [facility] [address]    Sig: TAKE 2 TABLETS DAILY    Class: E-Prescribe    SUMAtriptan (IMITREX) 25 MG tablet  30 tablet 0 8/3/2022    [facility] - [address]    Sig: Take 1 tablet (25 mg) by mouth at onset of headache for migraine May  repeat in 2 hours. Max 4 tablets/24 hours.    Class: E-Prescribe    Route: Oral      Clinic-Administered Medications as of 10/28/2022       Dose Frequency Start End    iohexol (OMNIPAQUE) 350 MG/ML injectable solution 4 mL (Completed) 4 mL ONCE 10/28/2022 10/28/2022    Route: Intravenous          Exam:   Temp:  [98.1  F (36.7  C)] 98.1  F (36.7  C)  Pulse:  [86-89] 89  Resp:  [16] 16  BP: (118-134)/(78-84) 122/84  SpO2:  [99 %-100 %] 100 %  Body mass index is 27.31 kg/m .  Temp:  [98.1  F (36.7  C)] 98.1  F (36.7  C)  Pulse:  [86-89] 89  Resp:  [16] 16  BP: (118-134)/(78-84) 122/84  SpO2:  [99 %-100 %] 100 %  Appearance: in no apparent distress.   Skin: normal  Head and Neck: Normal, no rashes or jaundice  Respiratory: normal respiratory excursions, no audible wheeze  Cardiovascular: RRR  Abdomen: flat, No distention and No surgical scars   Extremeties: Edema, none  Neuro: without deficit       Diagnostics:   Recent Results (from the past 336 hour(s))   HCG quantitative pregnancy    Collection Time: 10/28/22  7:32 AM   Result Value Ref Range    hCG Quantitative <1 <5 mIU/mL   CBC with platelets    Collection Time: 10/28/22  7:32 AM   Result Value Ref Range    WBC Count 8.0 4.0 - 11.0 10e3/uL    RBC Count 4.53 3.80 - 5.20 10e6/uL    Hemoglobin 13.7 11.7 - 15.7 g/dL    Hematocrit 41.0 35.0 - 47.0 %    MCV 91 78 - 100 fL    MCH 30.2 26.5 - 33.0 pg    MCHC 33.4 31.5 - 36.5 g/dL    RDW 14.2 10.0 - 15.0 %    Platelet Count 278 150 - 450 10e3/uL   Routine UA with microscopic    Collection Time: 10/28/22  7:32 AM   Result Value Ref Range    Color Urine Straw Colorless, Straw, Light Yellow, Yellow    Appearance Urine Clear Clear    Glucose Urine Negative Negative mg/dL    Bilirubin Urine Negative Negative    Ketones Urine Negative Negative mg/dL    Specific Gravity Urine 1.005 1.003 - 1.035    Blood Urine Negative Negative    pH Urine 6.5 5.0 - 7.0    Protein Albumin Urine Negative Negative mg/dL    Urobilinogen Urine Normal  Normal, 2.0 mg/dL    Nitrite Urine Negative Negative    Leukocyte Esterase Urine Negative Negative    RBC Urine 1 <=2 /HPF    WBC Urine 1 <=5 /HPF    Squamous Epithelials Urine 1 <=1 /HPF   Partial thromboplastin time    Collection Time: 10/28/22  7:32 AM   Result Value Ref Range    aPTT 27 22 - 38 Seconds   INR    Collection Time: 10/28/22  7:32 AM   Result Value Ref Range    INR 0.96 0.85 - 1.15   Hemoglobin A1c    Collection Time: 10/28/22  7:32 AM   Result Value Ref Range    Hemoglobin A1C 5.2 <5.7 %   Phosphorus    Collection Time: 10/28/22  7:32 AM   Result Value Ref Range    Phosphorus 4.3 2.5 - 4.5 mg/dL   Comprehensive metabolic panel    Collection Time: 10/28/22  7:32 AM   Result Value Ref Range    Sodium 137 136 - 145 mmol/L    Potassium 4.0 3.4 - 5.3 mmol/L    Chloride 101 98 - 107 mmol/L    Carbon Dioxide (CO2) 25 22 - 29 mmol/L    Anion Gap 11 7 - 15 mmol/L    Urea Nitrogen 7.3 6.0 - 20.0 mg/dL    Creatinine 0.94 0.51 - 0.95 mg/dL    Calcium 9.9 8.6 - 10.0 mg/dL    Glucose 86 70 - 99 mg/dL    Alkaline Phosphatase 55 35 - 104 U/L    AST 40 (H) 10 - 35 U/L    ALT 32 10 - 35 U/L    Protein Total 7.7 6.4 - 8.3 g/dL    Albumin 4.5 3.5 - 5.2 g/dL    Bilirubin Total 0.2 <=1.2 mg/dL    GFR Estimate 82 >60 mL/min/1.73m2           Again, thank you for allowing me to participate in the care of your patient.        Sincerely,        Tawanna Macdonald MD, MD

## 2022-10-28 NOTE — PROGRESS NOTES
Living Kidney Donor Consent per OPTN Policy 14.2 for Independent Living Donor Advocate (VICTORIANO)    Organ Type: Nondirected Kidney Donor  Presenting Information:  Lizzeth Alarcon presents to [facility] to complete a living donor evaluation since she is interested in becoming a Nondirected Kidney donor.  She had considered being a donor two years ago, but didn't receive a call back at that time.  It was to someone she didn't know.  She has remained interested in donation so decided to consider NDD.  She is a regular blood donor.  She came to the transplant center alone today.  She was pleasant and forthcoming in sharing information.    Written assurance has been obtained from the potential donor that he/she:   Is willing to donate  Is free from inducement and coercion  Has been informed that the he/she may decline to donate at any time  Has been informed that transplant centers must:   A) Offer donors an opportunity to discontinue the donor consent or evaluation process in a way that is protected and confidential  B) Provide an independent living donor advocate (VICTORIANO) to assist the potential donor during this process    The following was presented to the potential donor in a language in which the potential donor is able to engage in meaningful dialogue:   Education and instruction about all phases of the living donation process including:   Consent  Medical and psychosocial evaluation  Information about the surgical procedure  Pre and post operative care  Benefits of post operative follow up  Disclosure that the recovery hospital will take all reasonable precautions to provide confidentiality for the donor/recipient  Disclosure that it is a federal crime for any person to knowingly acquire, obtain or otherwise transfer any human organ for valuable consideration  Disclosure that the recovery hospital must provide an independent living donor  advocate (VICTORIANO)  Disclosure that health information obtained during the evaluation is subject to the same regulations as all records and could reveal conditions that must be reported to local, state, or federal public health authorities  Disclosure that the [facility] is required to report living donor follow up information at 6 months, 1 year, and 2 years, and that the potential donor must commit to post operative follow up testing coordinated by the [facility]    Disclosure has been provided that these risks may be transient or permanent & include but are not limited to:  Potential psychosocial risks:  Problems with body image  Post-surgery depression or anxiety  Feelings of emotional distress or bereavement if recipient experiences any recurrent disease or in the event of the recipient s death  Impact of donation on the donor s lifestyle, such as limited ability to exercise in the short term post operative recovery period, no driving for the first 2 weeks post op or until the donor is no longer needing pain medications that impair the ability to drive.      Potential financial impacts:  Personal expenses of travel, housing, , lost wages related to donation might not be reimbursed. However, resources may be available to defray some donation-related expenses.   Need for life-long follow up at the donor s expense  Loss of employment or income  Negative impact on the ability to obtain future employment  Negative impact on the ability to obtain, maintain, or afford health, disability, and life insurance  Future health problems experienced by living donors following donation may not be covered by the recipient s insurance      PREPARATION FOR DONATION, RECOVERY, AND POTENTIAL SHORT-LONG-TERM OUTCOMES:  Understanding of the Living Donation Process:  We discussed the role of Independent Living Donor Advocate.  Short and long term medical and psychosocial risks to both, donor and recipient were  reviewed and she appears capable of understanding the risks.  Post surgical restrictions (2 weeks no driving, 6 weeks no lifting over 10 lbs) were reviewed and she appears capable of adhering to the post surgical requirements. The need for a caregiver was discussed and her parents would care for her .  The risk of poor psychosocial outcome including problems with body image, post-surgery depression or anxiety, or feelings of emotional distress or bereavement if recipient experiences any recurrent disease, poor outcome or death was reviewed.  Additionally, potential financial implications, including the risk of having difficulty obtaining health care insurance, life insurance, disability insurance, or long term care insurance were reviewed, as were available donor grants to assist with donor related expenses.      IMPRESSIONS/RECOMMENDATIONS:  Lizzeth Alarcon appears highly motivated to be a Nondirected kidney donor.  She appears capable of understanding this information and making an informed medical decision.  As VICTORIANO, no concerns were identified today.  She has my contact information and is aware that I am available thoughout the donation process.    Contact Information:  ZAHRA GARIBAY, [facility]  Clinical  and Independent Donor Advocate  [facility]  Phone - [phone]  Pager - [phone]  [email or web address]      Time Spent: 30 minutes

## 2022-10-28 NOTE — NURSING NOTE
Blood was unsuccessfully drawn from the Iohexol line put in at the [facility] infusion center at 7:52 AM    Lab had to be drawn from the opposite arm to successfully get the lab drawn in a timely manner.     Madeline Dyer CMA

## 2022-10-28 NOTE — NURSING NOTE
Chief Complaint   Patient presents with     Blood Draw     Labs drawn via piv start by RN. Vitals taken.     Labs drawn from PIV placed by RN. Line flushed with saline. Vitals taken. Pt checked in for appointment(s).    Nguyen Sol RN

## 2022-10-28 NOTE — NURSING NOTE
"Chief Complaint   Patient presents with     Transplant Donor Evaluation     KIDNEY, NO DIRECTED DONATION     Vitals:    10/28/22 0839 10/28/22 0903 10/28/22 0904   BP: 121/79 118/78 122/84   Pulse: 89     SpO2: 100%     Weight: 78.9 kg (174 lb)     Height: 1.7 m (5' 6.93\")       Madeline Dyer CMA    "

## 2022-10-28 NOTE — LETTER
"    10/28/2022         RE: Lizzeth Alarcon  [address]  [facility] [address]        Dear Colleague,    Thank you for referring your patient, Lizzeth Alarcon, to the [facility]. Please see a copy of my visit note below.    Iohexol Timed Test Nursing Note    Lizzeth Alarcon comes to [facility] today for a Iohexol GFR Timed test.       Progress Note      The following information was verified with the patient:  *Female patients are not pregnant or could not have become recently pregnant: No  *Is there a history of allergy (skin rash, swelling, ect) to :   a. Iodine (except skin reactions to Betadine): NO   b. Intravenous radio-contrast agents: NO   c. Seafood: NO     RN provided patient with educational handout regarding timed test. YES    Medication administered :   Iohexol (Omnipaque 300 mg Iodine/ ml concentration) 5 mls.  Site administered Right AC  Start [id number]  Stop [id number]    Transplant coordinator to draw labs at 2 and 4 hours    Patient tolerated the procedure well    Vitals were reviewed   /83 (BP Location: Right arm)   Pulse 86   Temp 98.1  F (36.7  C) (Oral)   Resp 16   Ht 1.702 m (5' 7\")   Wt 79.3 kg (174 lb 12.8 oz)   SpO2 99%   BMI 27.38 kg/m      Discharge Plan    Discharge instructions reviewed with patient: YES  Discharge papers printed and given to patient: YES  Patient/Representative verbalized understanding, all questions answered: YES      Lizzeth Dunn RN  "

## 2022-10-28 NOTE — LETTER
10/28/2022       RE: Lizzeth Alarcon  [address]  [facility] [address]     Dear Colleague,    Thank you for referring your patient, Lizzeth Alarcon, to the [facility] [place] [facility]. Please see a copy of my visit note below.    TRANSPLANT NEPHROLOGY DONOR EVALUATION    Assessment and Plan:  # Prospective Kidney Transplant Donor: Patient with one issue that needs to be addressed prior to donation. Patient's blood pressure is acceptable at this visit, kidney function appears to be acceptable with Iohexol pending, and urinalysis is bland.    # Slightly Elevated AST: Unclear etiology, but may be a combination of slightly overweight, mild dyslipidemia and some alcohol use, although she doesn't have a > 1.5 AST/ALT ratio.  The CTA reports a normal liver, but would recommend some weight loss and slight decrease in alcohol intake for overall health.    Discussed the risks of donating a kidney, including the surgical risk and the possible risks of living with one kidney.    Education about expected post-donation kidney function and how chronic kidney disease (CKD) and end stage kidney disease (ESKD) might potentially impact the donor in the future, include, but not limited to:       - On average, donors will have 25-35% permanent loss of kidney function at donation.       - Baseline risk of ESKD may slightly exceed that of members of the general population with the same demographic profile.       - Donor risks must be interpreted in light of known epidemiology of both CKD or ESKD, such as that CKD generally develops in midlife (40-50 years old) and ESKD generally develops after age 60.       - Medical evaluation of young potential donors cannot predict lifetime risk of CKD or ESKD.       - Donors may be at higher risk for CKD if they sustain damage to the remaining kidney.       - Development of CKD and progression of ESKD may be  more rapid with only 1 kidney.       - Some type of kidney replacement therapy, either kidney transplant or dialysis, is required when reaching ESKD.    Potential medical or surgical risks include, but not limited to:       - Death.       - Scars, pain, fatigue, and other consequences typical of any surgical procedure.       - Decreased kidney function.       - Abdominal or bowel symptoms, such as bloating and nausea, and developing bowel obstruction.       - Kidney failure (ESKD) and the need for a kidney transplant or dialysis for the donor.       - Impact of obesity, hypertension, or other donor-specific medical conditions on morbidity and mortality of the potential donor.    Patients overall evaluation will be discussed with the transplant team and a final recommendation on the patients' suitability to be a kidney transplant donor will be made at that time.    Consult:  Lizzeth Alarcon was seen in consultation at the request of Dr. Tawanna Macdonald for evaluation as a potential kidney transplant donor.    Reason for Visit:  Lizzeth Alarcon is a 32 year old female who presents for a kidney donor evaluation.  Patient would like to be a non-directed donor.    Present Condition and Donor-Related Medical History:    Patient reports having an interest in donating a kidney since 2019 when a friend's brother needed a kidney.  She did the online screening, but wasn't contacted about being a donor.  She continued to think about donating and feels the risk is relatively low and she could benefit others.    Energy level is good and has been normal.  She is active and does gets regular exercise.  Denies any chest pain or shortness of breath with exertion.  Appetite is good and no recent weight change.  No nausea, vomiting or diarrhea.  No fever, sweats or chills.  No leg swelling.  No pain or burning with urination.         Kidney Disease Hx:       h/o Kidney Problems: No  Family h/o Genetic Kidney Disease: No        h/o Hypertension: No    Usual Blood Pressure: Not checked       h/o Protein in Urine: No  h/o Blood in Urine: No       h/o Kidney Stones: No  h/o Kidney Injury: No       h/o Recurrent UTI: No  h/o Genitourinary Problems: No       h/o Chronic NSAID Use: No         Other Medical Hx:       h/o Diabetes: No             h/o Gastrointestinal, Pancreas or Liver Problems: No       h/o Lung or Heart Problems: No       h/o Hematologic Problems: No  h/o Bleeding or Clotting Problems: No       h/o Cancer: No       h/o Infection Problems: No       h/o Gestational DM: Not applicable      h/o Preeclampsia: Not applicable         Skin Cancer Risk:       h/o more than 50 moles: No       h/o extensive sun exposure: No       h/o melanoma: No       Family h/o melanoma: No         Mental Health Assessment:       h/o Depression: No       h/o Psychiatric Illness: No       h/o Suicidal Attempt(s): No    COVID Status:  Vaccination Up To Date: Yes  H/o COVID Infection: Yes; Earlier this month with no residual symptoms     Review Of Systems:   A comprehensive review of systems was obtained and negative, except as noted in the HPI or PMH.    Past Medical History:   History was taken from the patient as noted below.  Past Medical History:   Diagnosis Date     Acne      Closed fracture of olecranon process of ulna 04/2006    left olecranon fracture     Contraceptive management 2008     Exercise-induced asthma      Migraine headache        Past Social History:   Past Surgical History:   Procedure Laterality Date     LASIK Bilateral      WISDOM TOOTH EXTRACTION       Personal or family history of anesthesia problems: No    Family History:   Family History   Problem Relation Age of Onset     Thyroid Disease Mother      Depression Mother      Mental Illness Mother      Asthma Father      Hypertension Sister      Respiratory Brother         Exercised induced Asthma     Breast Cancer Other      Colon Cancer No family hx of      Diabetes No family hx  of      Myocardial Infarction No family hx of      Melanoma No family hx of      Skin Cancer No family hx of      Kidney Disease No family hx of           Specific Family History in First Degree Relatives:       FH of Kidney Dz: No FH of Diabetes: No       FH of Hypertension: Yes   FH of CAD: No       FH of Cancer: No  FH of Kidney Cancer: No    Personal History:   Social History     Socioeconomic History     Marital status: Single     Spouse name: single     Number of children: 0     Years of education: Not on file     Highest education level: Not on file   Occupational History     Occupation: marketing   Tobacco Use     Smoking status: Never     Smokeless tobacco: Never     Tobacco comments:     no second hand smoke   Substance and Sexual Activity     Alcohol use: Yes     Alcohol/week: 5.0 - 7.0 standard drinks     Types: 5 - 7 Standard drinks or equivalent per week     Drug use: No     Sexual activity: Never   Other Topics Concern     Parent/sibling w/ CABG, MI or angioplasty before 65F 55M? No   Social History Narrative     Not on file     Social Determinants of Health     Financial Resource Strain: Not on file   Food Insecurity: Not on file   Transportation Needs: Not on file   Physical Activity: Not on file   Stress: Not on file   Social Connections: Not on file   Intimate Partner Violence: Not on file   Housing Stability: Not on file          Specific Social History:       Health Insurance Status: Yes       Employment Status: Full time  Occupation: Sales of cooking oil for [employer]                       Living Arrangements: lives alone       Social Support: Yes       Presence of increased risk for disease transmission behaviors as defined by PHS guidelines: No        Allergies:  Allergies   Allergen Reactions     Minocycline      Joint pains       Medications:  Current Outpatient Medications   Medication Sig     ENSKYCE 0.15-30 MG-MCG tablet      spironolactone (ALDACTONE) 100 MG tablet TAKE 2 TABLETS DAILY      SUMAtriptan (IMITREX) 25 MG tablet Take 1 tablet (25 mg) by mouth at onset of headache for migraine May repeat in 2 hours. Max 4 tablets/24 hours.     No current facility-administered medications for this visit.     Medications Discontinued During This Encounter   Medication Reason     tretinoin (RETIN-A) 0.025 % external cream      hydroquinone (BOB) 4 % external cream      hydrocortisone, Perianal, (HYDROCORTISONE) 2.5 % cream          Vitals:  Vital Signs 10/28/2022 10/28/2022 10/28/2022   Systolic 122 118 122   Diastolic 84 78 84   Pulse - - -   Temperature - - -   Respirations - - -   Weight (LB) - - -   Height - - -   BMI (Calculated) - - -   Pain Score - - -   O2 - - -   Some encounter information is confidential and restricted. Go to Review Flowsheets activity to see all data.       Exam:   GENERAL APPEARANCE: alert and no distress  HENT: mouth without ulcers or lesions  LYMPHATICS: no cervical or supraclavicular nodes  RESP: lungs clear to auscultation - no rales, rhonchi or wheezes  CV: regular rhythm, normal rate, no rub, no murmur  EDEMA: no LE edema bilaterally  ABDOMEN: soft, nondistended, nontender, bowel sounds normal  MS: extremities normal - no gross deformities noted, no evidence of inflammation in joints, no muscle tenderness  SKIN: no rash    Results:   Labs and imaging were ordered for this visit and reviewed by me.  Recent Results (from the past 336 hour(s))   EBV Capsid Antibody IgG    Collection Time: 10/28/22  7:32 AM   Result Value Ref Range    EBV Capsid Sylvia IgG Instrument Value <10.0 <18.0 U/mL    EBV Capsid Antibody IgG No detectable antibody. No detectable antibody.   CMV Antibody IgG    Collection Time: 10/28/22  7:32 AM   Result Value Ref Range    CMV Sylvia IgG Instrument Value 3.60 (H) <0.60 U/mL    CMV Antibody IgG Positive, suggests recent or past exposure. (A) No detectable antibody.    HCG quantitative pregnancy    Collection Time: 10/28/22  7:32 AM   Result Value Ref  Range    hCG Quantitative <1 <5 mIU/mL   West Nile Virus Antibody IgG IgM    Collection Time: 10/28/22  7:32 AM   Result Value Ref Range    West Nile IgG Serum 1.40 (H) <=1.29 IV    West Nile IgM Serum 0.01 <=0.89 IV   Treponema Abs w Reflex to RPR and Titer    Collection Time: 10/28/22  7:32 AM   Result Value Ref Range    Treponema Antibody Total Nonreactive Nonreactive   HIV Antigen Antibody Combo Pretransplant    Collection Time: 10/28/22  7:32 AM   Result Value Ref Range    HIV Antigen Antibody Combo Pretransplant Nonreactive Nonreactive   Hepatitis C antibody    Collection Time: 10/28/22  7:32 AM   Result Value Ref Range    Hepatitis C Antibody Nonreactive Nonreactive   Hepatitis B surface antigen    Collection Time: 10/28/22  7:32 AM   Result Value Ref Range    Hepatitis B Surface Antigen Nonreactive Nonreactive   Hepatitis B Surface Antibody    Collection Time: 10/28/22  7:32 AM   Result Value Ref Range    Hepatitis B Surface Antibody Instrument Value 74.92 <8.00 m[IU]/mL    Hepatitis B Surface Antibody Reactive    Hepatitis B core antibody    Collection Time: 10/28/22  7:32 AM   Result Value Ref Range    Hepatitis B Core Antibody Total Nonreactive Nonreactive   Protein  random urine    Collection Time: 10/28/22  7:32 AM   Result Value Ref Range    Total Protein Urine mg/dL <6.0 mg/dL    Total Protein UR MG/MG CR      Creatinine Urine mg/dL 30.6 mg/dL   Albumin Random Urine Quantitative with Creat Ratio    Collection Time: 10/28/22  7:32 AM   Result Value Ref Range    Albumin Urine mg/L <12.0 mg/L    Albumin Urine mg/g Cr      Creatinine Urine mg/dL 30.2 mg/dL   CBC with platelets    Collection Time: 10/28/22  7:32 AM   Result Value Ref Range    WBC Count 8.0 4.0 - 11.0 10e3/uL    RBC Count 4.53 3.80 - 5.20 10e6/uL    Hemoglobin 13.7 11.7 - 15.7 g/dL    Hematocrit 41.0 35.0 - 47.0 %    MCV 91 78 - 100 fL    MCH 30.2 26.5 - 33.0 pg    MCHC 33.4 31.5 - 36.5 g/dL    RDW 14.2 10.0 - 15.0 %    Platelet Count 278  150 - 450 10e3/uL   Routine UA with microscopic    Collection Time: 10/28/22  7:32 AM   Result Value Ref Range    Color Urine Straw Colorless, Straw, Light Yellow, Yellow    Appearance Urine Clear Clear    Glucose Urine Negative Negative mg/dL    Bilirubin Urine Negative Negative    Ketones Urine Negative Negative mg/dL    Specific Gravity Urine 1.005 1.003 - 1.035    Blood Urine Negative Negative    pH Urine 6.5 5.0 - 7.0    Protein Albumin Urine Negative Negative mg/dL    Urobilinogen Urine Normal Normal, 2.0 mg/dL    Nitrite Urine Negative Negative    Leukocyte Esterase Urine Negative Negative    RBC Urine 1 <=2 /HPF    WBC Urine 1 <=5 /HPF    Squamous Epithelials Urine 1 <=1 /HPF   Partial thromboplastin time    Collection Time: 10/28/22  7:32 AM   Result Value Ref Range    aPTT 27 22 - 38 Seconds   INR    Collection Time: 10/28/22  7:32 AM   Result Value Ref Range    INR 0.96 0.85 - 1.15   Hemoglobin A1c    Collection Time: 10/28/22  7:32 AM   Result Value Ref Range    Hemoglobin A1C 5.2 <5.7 %   Phosphorus    Collection Time: 10/28/22  7:32 AM   Result Value Ref Range    Phosphorus 4.3 2.5 - 4.5 mg/dL   Uric acid    Collection Time: 10/28/22  7:32 AM   Result Value Ref Range    Uric Acid 4.7 2.4 - 5.7 mg/dL   Lipid Profile    Collection Time: 10/28/22  7:32 AM   Result Value Ref Range    Cholesterol 226 (H) <200 mg/dL    Triglycerides 96 <150 mg/dL    Direct Measure HDL 62 >=50 mg/dL    LDL Cholesterol Calculated 145 (H) <=100 mg/dL    Non HDL Cholesterol 164 (H) <130 mg/dL   Comprehensive metabolic panel    Collection Time: 10/28/22  7:32 AM   Result Value Ref Range    Sodium 137 136 - 145 mmol/L    Potassium 4.0 3.4 - 5.3 mmol/L    Chloride 101 98 - 107 mmol/L    Carbon Dioxide (CO2) 25 22 - 29 mmol/L    Anion Gap 11 7 - 15 mmol/L    Urea Nitrogen 7.3 6.0 - 20.0 mg/dL    Creatinine 0.94 0.51 - 0.95 mg/dL    Calcium 9.9 8.6 - 10.0 mg/dL    Glucose 86 70 - 99 mg/dL    Alkaline Phosphatase 55 35 - 104 U/L     AST 40 (H) 10 - 35 U/L    ALT 32 10 - 35 U/L    Protein Total 7.7 6.4 - 8.3 g/dL    Albumin 4.5 3.5 - 5.2 g/dL    Bilirubin Total 0.2 <=1.2 mg/dL    GFR Estimate 82 >60 mL/min/1.73m2   Quantiferon TB Gold Plus Grey Tube    Collection Time: 10/28/22  7:32 AM    Specimen: Arm, Left; Blood   Result Value Ref Range    Quantiferon Nil Tube 0.01 IU/mL   Quantiferon TB Gold Plus Green Tube    Collection Time: 10/28/22  7:32 AM    Specimen: Arm, Left; Blood   Result Value Ref Range    Quantiferon TB1 Tube 0.03 IU/mL   Quantiferon TB Gold Plus Yellow Tube    Collection Time: 10/28/22  7:32 AM    Specimen: Arm, Left; Blood   Result Value Ref Range    Quantiferon TB2 Tube 0.03    Quantiferon TB Gold Plus Purple Tube    Collection Time: 10/28/22  7:32 AM    Specimen: Arm, Left; Blood   Result Value Ref Range    Quantiferon Mitogen 10.00 IU/mL   Adult Type and Screen    Collection Time: 10/28/22  7:32 AM   Result Value Ref Range    ABO/RH(D) O NEG     Antibody Screen Negative Negative    SPECIMEN EXPIRATION DATE [id number]    Quantiferon TB Gold Plus    Collection Time: 10/28/22  7:32 AM    Specimen: Arm, Left; Blood   Result Value Ref Range    Quantiferon-TB Gold Plus Negative Negative    TB1 Ag minus Nil Value 0.02 IU/mL    TB2 Ag minus Nil Value 0.02 IU/mL    Mitogen minus Nil Result 9.99 IU/mL    Nil Result 0.01 IU/mL   ABO and Rh 2nd type and screen required    Collection Time: 10/28/22  7:55 AM   Result Value Ref Range    ABO/RH(D) O NEG     SPECIMEN EXPIRATION DATE [id number]    Iohexol 1st Order    Collection Time: 10/28/22 10:10 AM   Result Value Ref Range    Iohexol Time 1 6.79 mg/dL    Iohexol Time 2 2.48 mg/dL    Iohexol Body Surface Area 1.948 m2    Iohexol Raw Clearance 113 mL/min    Iohexol Std Clearance 100 /1.73 m2   EKG 12-lead complete w/read - Clinics    Collection Time: 10/28/22  1:04 PM   Result Value Ref Range    Systolic Blood Pressure  mmHg    Diastolic Blood Pressure  mmHg    Ventricular Rate 84  BPM    Atrial Rate 84 BPM    NV Interval 132 ms    QRS Duration 78 ms     ms    QTc 439 ms    P Axis 19 degrees    R AXIS 87 degrees    T Axis 47 degrees    Interpretation ECG       Sinus rhythm  Normal ECG  No previous ECGs available  Confirmed by MD DAUGHERTY JANE ([id number]) on 10/31/2022 9:42:16 AM         Again, thank you for allowing me to participate in the care of your patient.      Sincerely,  Murali Evangelista MD

## 2022-10-28 NOTE — PROGRESS NOTES
"Iohexol Timed Test Nursing Note    Lizzeth MANE Yamilexmiladys comes to [facility] today for a Iohexol GFR Timed test.       Progress Note      The following information was verified with the patient:  *Female patients are not pregnant or could not have become recently pregnant: No  *Is there a history of allergy (skin rash, swelling, ect) to :   a. Iodine (except skin reactions to Betadine): NO   b. Intravenous radio-contrast agents: NO   c. Seafood: NO     RN provided patient with educational handout regarding timed test. YES    Medication administered :   Iohexol (Omnipaque 300 mg Iodine/ ml concentration) 5 mls.  Site administered Right AC  Start [id number]  Stop [id number]    Transplant coordinator to draw labs at 2 and 4 hours    Patient tolerated the procedure well    Vitals were reviewed   /83 (BP Location: Right arm)   Pulse 86   Temp 98.1  F (36.7  C) (Oral)   Resp 16   Ht 1.702 m (5' 7\")   Wt 79.3 kg (174 lb 12.8 oz)   SpO2 99%   BMI 27.38 kg/m      Discharge Plan    Discharge instructions reviewed with patient: YES  Discharge papers printed and given to patient: YES  Patient/Representative verbalized understanding, all questions answered: YES      Lizzeth Dunn RN      "

## 2022-10-28 NOTE — NURSING NOTE
"Chief Complaint   Patient presents with     Transplant Donor Evaluation     Kidney, non-directed donation     Vitals:    10/28/22 0856 10/28/22 0857 10/28/22 0858   BP: 121/79 118/78 122/84   Pulse: 89     SpO2: 100%     Weight: 78.9 kg (174 lb)     Height: 1.7 m (5' 6.93\")       Madeline Dyer CMA    "

## 2022-10-28 NOTE — PROGRESS NOTES
[facility] Solid Organ Transplant  Outpatient MNT: Kidney Donor Evaluation    Current BMI: 27.3 (HT 67 in,  lbs/79 kg)  BMI is within recommendation of <30 for kidney donation    8 Year Estimated Risk of T2DM  </= 3%     Time Spent: 15 minutes  Visit Type: Initial  Referring Physician: Renae   Pt accompanied by: self     Nutrition Assessment  Vitamins, Supplements, Pertinent Meds: none   Herbal Medicines/Supplements: Athletic Greens powder - contains several herbal components   - ashwagandha: hepatic ...Orally, ashwagandha in doses of 154-1350 mg daily has played a role in several case reports of liver injury.  - astragalus: a case of high serum  levels and small liver and kidney cysts has been reported for a 38-year-old woman who drank astragalus tea daily for one month.  - beet: renal ...Theoretically, ingestion of large quantities of beets could lead to kidney damage due to its oxaluric acid content  - blue green algae: hepatic ...Orally, significant elevations of liver function tests within 2 weeks of starting a spirulina blue-green algae supplement (species and dose unknown) have been reported in a 52-year-old man stabilized on amlodipine, simvastatin, and acarbose.  - green tea extract: hepatic ...There is concern that some green tea products, especially green tea extracts, can cause hepatotoxicity in some patients.  - reishi mushroom: hepatic ...One case of hepatotoxicity and one case of fatal fulminant hepatitis have been reported in patients who had used reishi mushroom powder for 1-2 months    Weight hx: stable    Food Security: any concerns about having enough money to buy food or access to grocery stores? No     Diet Recall  Breakfast Toast or muffin    Lunch Letts    Dinner Pasta w/ veggie; [employer] take out (may contain meat, just doesn't cook meat at home); microwave broccoli & cheese meal    Snacks Granola bar, fruit, yogurt    Beverages Coffee, sugar free sports drinks,  sparkling or plain water, a few pop/week    Alcohol 7 drinks/week    Dining out 3x/week      Physical Activity  HIIT workouts 2x/week     Labs  Recent Labs   Lab Test 10/28/22  0732 06/02/22  0816   CHOL 226* 219*   HDL 62 83   * 115*   TRIG 96 106       FBG = 86  A1c = 5.2  BP = wnl x 3     Prediction of Incident Diabetes Mellitus in Middle-aged Adults: The [place] Offspring Study  Yosi Eddy MD; James B. Meigs, MD, MPH; Yumiko Lock, PhD; Lizbeth Rivas MD, MPH; Zackery Monsivais MD; Brian Valdes Sr,   PhD  Pt's estimated risk for T2DM (per Table 6 above)  Pt received points for the following criteria: BMI>25  Total points: 2  8-Year estimated risk of T2DM: </= 3%    Nutrition Diagnosis  No nutrition diagnosis identified at this time.    Nutrition Intervention  Nutrition education provided:  Reviewed overall healthy diet guidelines for pre and post kidney donation. Discussed maintenance of a healthy weight and Na+ intake <3000 mg/day (<2000 mg/day if HTN).    Avoid the following post op d/t unknown effects on the organs:  - Herbal, Chinese, holistic, chiropractic, natural, alternative medicines and supplements  - Detoxes and cleanses  - Weight loss pills  - Protein powders or other products with extracts or herbs (ie green tea extract)    Patient Understanding: Pt verbalized understanding of education provided.  Expected Engagement: Good  Follow-Up Plans: PRN     Nutrition Goals  No nutrition goals identified at this time     Marleny Byrne, RD, LD, CCTD

## 2022-10-28 NOTE — PROGRESS NOTES
Saw Lizzeth in clinic on 10/28/22 for Living Kidney Donor Evaluation.     Lizzeth is interested in donation as a NDD.    I provided a folder which included copies of the followin. Living Kidney Donor Evaluation Consent  2. Paired Exchange Consent  3. Donor Shield Pamphlet  4. Living Donor Collective Study information  5. Kidney for Life pamphlet  6. Kidney Donors are Heroes! Study synopsis  7. Most current SRTR data.      I also provided a parking pass and food voucher.      I reviewed the Living Kidney Donor Evaluation Consent, dated 2020 and Paired Exchange/ NDD consent dated 2018.  I answered any question.    Evaluation Notes:    Internal tissue typing collected and sent.

## 2022-10-29 LAB
QUANTIFERON MITOGEN: 10 IU/ML
QUANTIFERON NIL TUBE: 0.01 IU/ML
QUANTIFERON TB1 TUBE: 0.03 IU/ML
QUANTIFERON TB2 TUBE: 0.03

## 2022-10-30 LAB
GAMMA INTERFERON BACKGROUND BLD IA-ACNC: 0.01 IU/ML
M TB IFN-G BLD-IMP: NEGATIVE
M TB IFN-G CD4+ BCKGRND COR BLD-ACNC: 9.99 IU/ML
MITOGEN IGNF BCKGRD COR BLD-ACNC: 0.02 IU/ML
MITOGEN IGNF BCKGRD COR BLD-ACNC: 0.02 IU/ML

## 2022-10-31 LAB
ATRIAL RATE - MUSE: 84 BPM
DIASTOLIC BLOOD PRESSURE - MUSE: NORMAL MMHG
INTERPRETATION ECG - MUSE: NORMAL
P AXIS - MUSE: 19 DEGREES
PR INTERVAL - MUSE: 132 MS
QRS DURATION - MUSE: 78 MS
QT - MUSE: 372 MS
QTC - MUSE: 439 MS
R AXIS - MUSE: 87 DEGREES
SYSTOLIC BLOOD PRESSURE - MUSE: NORMAL MMHG
T AXIS - MUSE: 47 DEGREES
VENTRICULAR RATE- MUSE: 84 BPM
WNV IGG SER IA-ACNC: 1.4 IV
WNV IGM SER IA-ACNC: 0.01 IV

## 2022-11-01 ENCOUNTER — DOCUMENTATION ONLY (OUTPATIENT)
Dept: TRANSPLANT | Facility: CLINIC | Age: 32
End: 2022-11-01

## 2022-11-01 LAB
BSA: 1.95 M2
IOHEXOL CL UR+SERPL-VRATE: 100 /1.73 M2
IOHEXOL CL UR+SERPL-VRATE: 113 ML/MIN
IOHEXOL CL UR+SERPL-VRATE: 2.48 MG/DL
IOHEXOL CL UR+SERPL-VRATE: 6.79 MG/DL

## 2022-11-01 NOTE — PROGRESS NOTES
Image Review Meeting    ATTENDEES: Charo Andre    IMAGES REVIEWED: CT Abdomen 10/28/22    DECISION: Left or choice    INCIDENTALS: No

## 2022-11-02 ENCOUNTER — COMMITTEE REVIEW (OUTPATIENT)
Dept: TRANSPLANT | Facility: CLINIC | Age: 32
End: 2022-11-02

## 2022-11-02 ENCOUNTER — TELEPHONE (OUTPATIENT)
Dept: TRANSPLANT | Facility: CLINIC | Age: 32
End: 2022-11-02

## 2022-11-02 LAB
A*: NORMAL
A*LOCUS SEROLOGIC EQUIVALENT: 2
A*LOCUS: NORMAL
A*SEROLOGIC EQUIVALENT: 3
ABTEST METHOD: NORMAL
B*: NORMAL
B*LOCUS SEROLOGIC EQUIVALENT: 7
B*LOCUS: NORMAL
B*SEROLOGIC EQUIVALENT: 62
BW-1: NORMAL
C*: NORMAL
C*LOCUS SEROLOGIC EQUIVALENT: 9
C*LOCUS: NORMAL
C*SEROLOGIC EQUIVALENT: 7
DPA1*: NORMAL
DPB1*: NORMAL
DQA1*: NORMAL
DQA1*LOCUS: NORMAL
DQB1*: NORMAL
DQB1*LOCUS SEROLOGIC EQUIVALENT: 8
DQB1*LOCUS: NORMAL
DQB1*SEROLOGIC EQUIVALENT: 6
DRB1*: NORMAL
DRB1*LOCUS SEROLOGIC EQUIVALENT: 4
DRB1*LOCUS: NORMAL
DRB1*SEROLOGIC EQUIVALENT: 15
DRB4*LOCUS SEROLOGIC EQUIVALENT: 53
DRB4*LOCUS: NORMAL
DRB5*: NORMAL
DRB5*SEROLOGIC EQUIVALENT: 51
DRSSO TEST METHOD: NORMAL

## 2022-11-02 NOTE — COMMITTEE REVIEW
Living Donor Committee Review Note Evaluation Date: 10/28/2022  Committee Review Date: 11/2/2022    Donor being evaluated for: Kidney    Transplant Phase: Evaluation  Transplant Status: Active    Transplant Coordinator: Charo Rivas  Transplant Surgeon:       Committee Review Members:  Immunology Mishel Ramos   Independent Living Donor Advocate Kailey Recio, [facility], Maria Guadalupe Estrella, [facility]   Nephrology Alcon Dye MD, Alyse Mariee MD, Murali Evangelista MD   Nutrition Marleny Byrne,    Pharmacist Nick Kahn, [facility]    - Clinical Allie Fletcher   Transplant Saadia Kylah Wagner, CATALINO, Aster Maravilla APRN CNP, Charo Rivas, RN, Chris Obrien, RN, Elizabet Ramírez LPN, Shea Lebron, CATALINO, Hillary Bolton, RN, Sayra Perez, RN   Transplant Surgery Kenny Reilly MD       Transplant Eligibility: Acceptable Mental Health, Acceptable Physical Health    Committee Review Decision: Approved    Relative Contraindications:     Absolute Contraindications:     Committee Chair Kenny Reilly MD verbally attested to the committee's decision.    Committee Discussion Details:   Reviewed all evaluation notes and results. Patient is approved.

## 2022-11-02 NOTE — TELEPHONE ENCOUNTER
Writer calling Lizzeth to let her know she has been approved. Lizzeth will send me a message with her availability for next week to review NKR consents and discuss next steps.

## 2022-11-02 NOTE — PROGRESS NOTES
TRANSPLANT NEPHROLOGY DONOR EVALUATION    Assessment and Plan:  # Prospective Kidney Transplant Donor: Patient with one issue that needs to be addressed prior to donation. Patient's blood pressure is acceptable at this visit, kidney function appears to be acceptable with Iohexol pending, and urinalysis is bland.    # Slightly Elevated AST: Unclear etiology, but may be a combination of slightly overweight, mild dyslipidemia and some alcohol use, although she doesn't have a > 1.5 AST/ALT ratio.  The CTA reports a normal liver, but would recommend some weight loss and slight decrease in alcohol intake for overall health.    Discussed the risks of donating a kidney, including the surgical risk and the possible risks of living with one kidney.    Education about expected post-donation kidney function and how chronic kidney disease (CKD) and end stage kidney disease (ESKD) might potentially impact the donor in the future, include, but not limited to:       - On average, donors will have 25-35% permanent loss of kidney function at donation.       - Baseline risk of ESKD may slightly exceed that of members of the general population with the same demographic profile.       - Donor risks must be interpreted in light of known epidemiology of both CKD or ESKD, such as that CKD generally develops in midlife (40-50 years old) and ESKD generally develops after age 60.       - Medical evaluation of young potential donors cannot predict lifetime risk of CKD or ESKD.       - Donors may be at higher risk for CKD if they sustain damage to the remaining kidney.       - Development of CKD and progression of ESKD may be more rapid with only 1 kidney.       - Some type of kidney replacement therapy, either kidney transplant or dialysis, is required when reaching ESKD.    Potential medical or surgical risks include, but not limited to:       - Death.       - Scars, pain, fatigue, and other consequences typical of any surgical procedure.        - Decreased kidney function.       - Abdominal or bowel symptoms, such as bloating and nausea, and developing bowel obstruction.       - Kidney failure (ESKD) and the need for a kidney transplant or dialysis for the donor.       - Impact of obesity, hypertension, or other donor-specific medical conditions on morbidity and mortality of the potential donor.    Patients overall evaluation will be discussed with the transplant team and a final recommendation on the patients' suitability to be a kidney transplant donor will be made at that time.    Consult:  Lizzeth Alarcon was seen in consultation at the request of Dr. Tawanna Macdonald for evaluation as a potential kidney transplant donor.    Reason for Visit:  Lizzeth Alarcon is a 32 year old female who presents for a kidney donor evaluation.  Patient would like to be a non-directed donor.    Present Condition and Donor-Related Medical History:    Patient reports having an interest in donating a kidney since 2019 when a friend's brother needed a kidney.  She did the online screening, but wasn't contacted about being a donor.  She continued to think about donating and feels the risk is relatively low and she could benefit others.    Energy level is good and has been normal.  She is active and does gets regular exercise.  Denies any chest pain or shortness of breath with exertion.  Appetite is good and no recent weight change.  No nausea, vomiting or diarrhea.  No fever, sweats or chills.  No leg swelling.  No pain or burning with urination.         Kidney Disease Hx:       h/o Kidney Problems: No  Family h/o Genetic Kidney Disease: No       h/o Hypertension: No    Usual Blood Pressure: Not checked       h/o Protein in Urine: No  h/o Blood in Urine: No       h/o Kidney Stones: No  h/o Kidney Injury: No       h/o Recurrent UTI: No  h/o Genitourinary Problems: No       h/o Chronic NSAID Use: No         Other Medical Hx:       h/o Diabetes: No             h/o  Gastrointestinal, Pancreas or Liver Problems: No       h/o Lung or Heart Problems: No       h/o Hematologic Problems: No  h/o Bleeding or Clotting Problems: No       h/o Cancer: No       h/o Infection Problems: No       h/o Gestational DM: Not applicable      h/o Preeclampsia: Not applicable         Skin Cancer Risk:       h/o more than 50 moles: No       h/o extensive sun exposure: No       h/o melanoma: No       Family h/o melanoma: No         Mental Health Assessment:       h/o Depression: No       h/o Psychiatric Illness: No       h/o Suicidal Attempt(s): No    COVID Status:  Vaccination Up To Date: Yes  H/o COVID Infection: Yes; Earlier this month with no residual symptoms     Review Of Systems:   A comprehensive review of systems was obtained and negative, except as noted in the HPI or PMH.    Past Medical History:   History was taken from the patient as noted below.  Past Medical History:   Diagnosis Date     Acne      Closed fracture of olecranon process of ulna 04/2006    left olecranon fracture     Contraceptive management 2008     Exercise-induced asthma      Migraine headache        Past Social History:   Past Surgical History:   Procedure Laterality Date     LASIK Bilateral      WISDOM TOOTH EXTRACTION       Personal or family history of anesthesia problems: No    Family History:   Family History   Problem Relation Age of Onset     Thyroid Disease Mother      Depression Mother      Mental Illness Mother      Asthma Father      Hypertension Sister      Respiratory Brother         Exercised induced Asthma     Breast Cancer Other      Colon Cancer No family hx of      Diabetes No family hx of      Myocardial Infarction No family hx of      Melanoma No family hx of      Skin Cancer No family hx of      Kidney Disease No family hx of           Specific Family History in First Degree Relatives:       FH of Kidney Dz: No FH of Diabetes: No       FH of Hypertension: Yes   FH of CAD: No       FH of Cancer:  No  FH of Kidney Cancer: No    Personal History:   Social History     Socioeconomic History     Marital status: Single     Spouse name: single     Number of children: 0     Years of education: Not on file     Highest education level: Not on file   Occupational History     Occupation: marketing   Tobacco Use     Smoking status: Never     Smokeless tobacco: Never     Tobacco comments:     no second hand smoke   Substance and Sexual Activity     Alcohol use: Yes     Alcohol/week: 5.0 - 7.0 standard drinks     Types: 5 - 7 Standard drinks or equivalent per week     Drug use: No     Sexual activity: Never   Other Topics Concern     Parent/sibling w/ CABG, MI or angioplasty before 65F 55M? No   Social History Narrative     Not on file     Social Determinants of Health     Financial Resource Strain: Not on file   Food Insecurity: Not on file   Transportation Needs: Not on file   Physical Activity: Not on file   Stress: Not on file   Social Connections: Not on file   Intimate Partner Violence: Not on file   Housing Stability: Not on file          Specific Social History:       Health Insurance Status: Yes       Employment Status: Full time  Occupation: Sales of cooking oil for [employer]                       Living Arrangements: lives alone       Social Support: Yes       Presence of increased risk for disease transmission behaviors as defined by [facility] guidelines: No        Allergies:  Allergies   Allergen Reactions     Minocycline      Joint pains       Medications:  Current Outpatient Medications   Medication Sig     ENSKYCE 0.15-30 MG-MCG tablet      spironolactone (ALDACTONE) 100 MG tablet TAKE 2 TABLETS DAILY     SUMAtriptan (IMITREX) 25 MG tablet Take 1 tablet (25 mg) by mouth at onset of headache for migraine May repeat in 2 hours. Max 4 tablets/24 hours.     No current facility-administered medications for this visit.     Medications Discontinued During This Encounter   Medication Reason     tretinoin (RETIN-A) 0.025 %  external cream      hydroquinone (BOB) 4 % external cream      hydrocortisone, Perianal, (HYDROCORTISONE) 2.5 % cream          Vitals:  Vital Signs 10/28/2022 10/28/2022 10/28/2022   Systolic 122 118 122   Diastolic 84 78 84   Pulse - - -   Temperature - - -   Respirations - - -   Weight (LB) - - -   Height - - -   BMI (Calculated) - - -   Pain Score - - -   O2 - - -   Some encounter information is confidential and restricted. Go to Review Flowsheets activity to see all data.       Exam:   GENERAL APPEARANCE: alert and no distress  HENT: mouth without ulcers or lesions  LYMPHATICS: no cervical or supraclavicular nodes  RESP: lungs clear to auscultation - no rales, rhonchi or wheezes  CV: regular rhythm, normal rate, no rub, no murmur  EDEMA: no LE edema bilaterally  ABDOMEN: soft, nondistended, nontender, bowel sounds normal  MS: extremities normal - no gross deformities noted, no evidence of inflammation in joints, no muscle tenderness  SKIN: no rash    Results:   Labs and imaging were ordered for this visit and reviewed by me.  Recent Results (from the past 336 hour(s))   EBV Capsid Antibody IgG    Collection Time: 10/28/22  7:32 AM   Result Value Ref Range    EBV Capsid Sylvia IgG Instrument Value <10.0 <18.0 U/mL    EBV Capsid Antibody IgG No detectable antibody. No detectable antibody.   CMV Antibody IgG    Collection Time: 10/28/22  7:32 AM   Result Value Ref Range    CMV Sylvia IgG Instrument Value 3.60 (H) <0.60 U/mL    CMV Antibody IgG Positive, suggests recent or past exposure. (A) No detectable antibody.    HCG quantitative pregnancy    Collection Time: 10/28/22  7:32 AM   Result Value Ref Range    hCG Quantitative <1 <5 mIU/mL   West Nile Virus Antibody IgG IgM    Collection Time: 10/28/22  7:32 AM   Result Value Ref Range    West Nile IgG Serum 1.40 (H) <=1.29 IV    West Nile IgM Serum 0.01 <=0.89 IV   Treponema Abs w Reflex to RPR and Titer    Collection Time: 10/28/22  7:32 AM   Result Value Ref Range     Treponema Antibody Total Nonreactive Nonreactive   HIV Antigen Antibody Combo Pretransplant    Collection Time: 10/28/22  7:32 AM   Result Value Ref Range    HIV Antigen Antibody Combo Pretransplant Nonreactive Nonreactive   Hepatitis C antibody    Collection Time: 10/28/22  7:32 AM   Result Value Ref Range    Hepatitis C Antibody Nonreactive Nonreactive   Hepatitis B surface antigen    Collection Time: 10/28/22  7:32 AM   Result Value Ref Range    Hepatitis B Surface Antigen Nonreactive Nonreactive   Hepatitis B Surface Antibody    Collection Time: 10/28/22  7:32 AM   Result Value Ref Range    Hepatitis B Surface Antibody Instrument Value 74.92 <8.00 m[IU]/mL    Hepatitis B Surface Antibody Reactive    Hepatitis B core antibody    Collection Time: 10/28/22  7:32 AM   Result Value Ref Range    Hepatitis B Core Antibody Total Nonreactive Nonreactive   Protein  random urine    Collection Time: 10/28/22  7:32 AM   Result Value Ref Range    Total Protein Urine mg/dL <6.0 mg/dL    Total Protein UR MG/MG CR      Creatinine Urine mg/dL 30.6 mg/dL   Albumin Random Urine Quantitative with Creat Ratio    Collection Time: 10/28/22  7:32 AM   Result Value Ref Range    Albumin Urine mg/L <12.0 mg/L    Albumin Urine mg/g Cr      Creatinine Urine mg/dL 30.2 mg/dL   CBC with platelets    Collection Time: 10/28/22  7:32 AM   Result Value Ref Range    WBC Count 8.0 4.0 - 11.0 10e3/uL    RBC Count 4.53 3.80 - 5.20 10e6/uL    Hemoglobin 13.7 11.7 - 15.7 g/dL    Hematocrit 41.0 35.0 - 47.0 %    MCV 91 78 - 100 fL    MCH 30.2 26.5 - 33.0 pg    MCHC 33.4 31.5 - 36.5 g/dL    RDW 14.2 10.0 - 15.0 %    Platelet Count 278 150 - 450 10e3/uL   Routine UA with microscopic    Collection Time: 10/28/22  7:32 AM   Result Value Ref Range    Color Urine Straw Colorless, Straw, Light Yellow, Yellow    Appearance Urine Clear Clear    Glucose Urine Negative Negative mg/dL    Bilirubin Urine Negative Negative    Ketones Urine Negative Negative mg/dL     Specific Gravity Urine 1.005 1.003 - 1.035    Blood Urine Negative Negative    pH Urine 6.5 5.0 - 7.0    Protein Albumin Urine Negative Negative mg/dL    Urobilinogen Urine Normal Normal, 2.0 mg/dL    Nitrite Urine Negative Negative    Leukocyte Esterase Urine Negative Negative    RBC Urine 1 <=2 /HPF    WBC Urine 1 <=5 /HPF    Squamous Epithelials Urine 1 <=1 /HPF   Partial thromboplastin time    Collection Time: 10/28/22  7:32 AM   Result Value Ref Range    aPTT 27 22 - 38 Seconds   INR    Collection Time: 10/28/22  7:32 AM   Result Value Ref Range    INR 0.96 0.85 - 1.15   Hemoglobin A1c    Collection Time: 10/28/22  7:32 AM   Result Value Ref Range    Hemoglobin A1C 5.2 <5.7 %   Phosphorus    Collection Time: 10/28/22  7:32 AM   Result Value Ref Range    Phosphorus 4.3 2.5 - 4.5 mg/dL   Uric acid    Collection Time: 10/28/22  7:32 AM   Result Value Ref Range    Uric Acid 4.7 2.4 - 5.7 mg/dL   Lipid Profile    Collection Time: 10/28/22  7:32 AM   Result Value Ref Range    Cholesterol 226 (H) <200 mg/dL    Triglycerides 96 <150 mg/dL    Direct Measure HDL 62 >=50 mg/dL    LDL Cholesterol Calculated 145 (H) <=100 mg/dL    Non HDL Cholesterol 164 (H) <130 mg/dL   Comprehensive metabolic panel    Collection Time: 10/28/22  7:32 AM   Result Value Ref Range    Sodium 137 136 - 145 mmol/L    Potassium 4.0 3.4 - 5.3 mmol/L    Chloride 101 98 - 107 mmol/L    Carbon Dioxide (CO2) 25 22 - 29 mmol/L    Anion Gap 11 7 - 15 mmol/L    Urea Nitrogen 7.3 6.0 - 20.0 mg/dL    Creatinine 0.94 0.51 - 0.95 mg/dL    Calcium 9.9 8.6 - 10.0 mg/dL    Glucose 86 70 - 99 mg/dL    Alkaline Phosphatase 55 35 - 104 U/L    AST 40 (H) 10 - 35 U/L    ALT 32 10 - 35 U/L    Protein Total 7.7 6.4 - 8.3 g/dL    Albumin 4.5 3.5 - 5.2 g/dL    Bilirubin Total 0.2 <=1.2 mg/dL    GFR Estimate 82 >60 mL/min/1.73m2   Quantiferon TB Gold Plus Grey Tube    Collection Time: 10/28/22  7:32 AM    Specimen: Arm, Left; Blood   Result Value Ref Range     Quantiferon Nil Tube 0.01 IU/mL   Quantiferon TB Gold Plus Green Tube    Collection Time: 10/28/22  7:32 AM    Specimen: Arm, Left; Blood   Result Value Ref Range    Quantiferon TB1 Tube 0.03 IU/mL   Quantiferon TB Gold Plus Yellow Tube    Collection Time: 10/28/22  7:32 AM    Specimen: Arm, Left; Blood   Result Value Ref Range    Quantiferon TB2 Tube 0.03    Quantiferon TB Gold Plus Purple Tube    Collection Time: 10/28/22  7:32 AM    Specimen: Arm, Left; Blood   Result Value Ref Range    Quantiferon Mitogen 10.00 IU/mL   Adult Type and Screen    Collection Time: 10/28/22  7:32 AM   Result Value Ref Range    ABO/RH(D) O NEG     Antibody Screen Negative Negative    SPECIMEN EXPIRATION DATE [id number]    Quantiferon TB Gold Plus    Collection Time: 10/28/22  7:32 AM    Specimen: Arm, Left; Blood   Result Value Ref Range    Quantiferon-TB Gold Plus Negative Negative    TB1 Ag minus Nil Value 0.02 IU/mL    TB2 Ag minus Nil Value 0.02 IU/mL    Mitogen minus Nil Result 9.99 IU/mL    Nil Result 0.01 IU/mL   ABO and Rh 2nd type and screen required    Collection Time: 10/28/22  7:55 AM   Result Value Ref Range    ABO/RH(D) O NEG     SPECIMEN EXPIRATION DATE [id number]    Iohexol 1st Order    Collection Time: 10/28/22 10:10 AM   Result Value Ref Range    Iohexol Time 1 6.79 mg/dL    Iohexol Time 2 2.48 mg/dL    Iohexol Body Surface Area 1.948 m2    Iohexol Raw Clearance 113 mL/min    Iohexol Std Clearance 100 /1.73 m2   EKG 12-lead complete w/read - Clinics    Collection Time: 10/28/22  1:04 PM   Result Value Ref Range    Systolic Blood Pressure  mmHg    Diastolic Blood Pressure  mmHg    Ventricular Rate 84 BPM    Atrial Rate 84 BPM    MA Interval 132 ms    QRS Duration 78 ms     ms    QTc 439 ms    P Axis 19 degrees    R AXIS 87 degrees    T Axis 47 degrees    Interpretation ECG       Sinus rhythm  Normal ECG  No previous ECGs available  Confirmed by MD DAUGHERTY JANE ([id number]) on 10/31/2022 9:42:16 AM

## 2022-11-08 ENCOUNTER — TELEPHONE (OUTPATIENT)
Dept: TRANSPLANT | Facility: CLINIC | Age: 32
End: 2022-11-08

## 2022-11-08 NOTE — LETTER
"PHYSICIAN ORDERS      DATE & TIME ISSUED: 2022 3:52 PM  PATIENT NAME: Lizzeth Alarcon   : 1990     [facility] MR# [if applicable]: [id number]     DIAGNOSIS:  Living Donor Evaluation  ICD-10 CODE: Z00.5  NKR Cryo Kit:  PHYSICIAN ORDERS         Order for Cryo Kit Blood Draw:     1. Please draw 7 yellow top (acid-citrate-dextrose) 10cc tubes & 1 purple top tube   Pre-Label the tubes with the date/time of the specimen collection, the donor alias  \"[id number]\", donor's , and phlebotomist's initials using the supplied labels.   Blood should be sent SAME DAY via FedEx overnight. May be collected M-F.     2. Mail blood to enclosed pre-paid return FedEx envelope.     3. Send Billing to [facility] (see included billing sheet).     Any questions please call: Charo Rivas at [phone]      .    "

## 2022-11-08 NOTE — LETTER
REIMBURSEMENT INFORMATION FOR LIVING ORGAN DONORS    LIVING ORGAN DONOR: This form MUST accompany & remain attached to Orders &  given to Provider and/or Healthcare Facility Business Office    PROVIDER/FACILITY INSTRUCTIONS: By accepting to perform these services for living organ  donation, the provider/facility agrees to exclusively bill the [facility] instead of billing  the patient or any insurance provider and agrees to accept the reimbursement, as described below, as  payment in full for services rendered.    PROVIDER BILLING INSTRUCTIONS:  1. [facility] agrees to pay for all authorized testing ordered by our transplant  program that is related to living organ donation. The attached orders/tests are part of the donor  Evaluation.    2. Do not bill the donor or donor's insurance. Send an itemized invoice, claim or statement to:    [facility]  Transplant Finance/Donor Billing  [address] [place] [address], [place] [address]    3. Billing statements must include the patient first and last name, date of birth, the CPT procedure code  and date of service. Please bill service on the ORIGINAL UBO4 or 1500 with appropriate CPT/HCPCS  codes along with W-9 and send to the above address to insure timely reimbursement.    4. Claims should be submitted no later than six months from the date when services are rendered.  Claims denied for late submission should not be billed to the donor or their private insurance carrier.    5. [facility] will reimburse all charges at 100% of the Medicare Fee Schedule as  defined in the Code of Federal Regulations (CFR) 42, Chapter IV. This is to be considered payment  in full. [facility], the patient, and/or the patient's insurance are NOT to  be billed any balance, co-payment, or deductible, per Medicare regulations. **ATTN: Facility  providing services for attached/enclosed  Living Donor Orders; If facility does NOT AGREE to  the reimbursement rate stated above, PLEASE DENY SERVICES & refer Donor/patient back to  their [facility] Coordinator Transplant Center.    6. Patients are NOT to make any payments at the time of service.    Please forward this information to your billing department so that a donor account can be set up with  these instructions.    Should you have any questions, please contact the Donor Billing office at [phone],  Monday - Friday, 8:00 a.m. to 4:00 p.m.   Thank you for your assistance.

## 2022-11-08 NOTE — TELEPHONE ENCOUNTER
"Lizzeth is a Non Directed Donor.  Today we reviewed education and consent for KPD and SRTR Data.     First we reviewed \"KPD Donor Informed Consent\" Revision date 9/5/2018 in its entirety.                               KPD Consent, NKR BRANDYN, and SRTR was sent via [facility].  I gave instructions on how to return them to me for practitioner signature.  We also discussed the NKR cryo kit.  She will do this at the [facility] and billing sheet/lab order will be sent. Lizzeth plans to do the cryo kit next week Wednesday, lab appt made for her. Lizzeth stated her preferred donation dates as 12/26/22- 01/13/23. Discussed the order of next steps- sign consents, cryo kit, then activated in paired exchange program sometime late November or early December.    Lizzeth does want to utilize the family voucher- will send her voucher document to complete. Let Lizzeth know I need her voucher before activating her in NKR and I will need intended recipient vouchers back before donation.     Lizzeth verbalized understanding of discussion and is in agreement with plan. Lizzeth  knows how to get a hold of me in the meantime if she has any additional questions/concerns.    "

## 2022-11-08 NOTE — LETTER
Ms. Lizzeth Alarcon   [address]  [place] [address]   November 2, 2022     Dear Lizzeth   I am writing on behalf of the Transplant Program at the [facility]. I would like to take this opportunity to thank you for recently undergoing an evaluation as a possible kidney donor.   Your evaluation is complete and your test results have been reviewed and discussed by the donor team at our weekly meeting. I am pleased to tell you that the team has determined that you meet our criteria for living kidney donation and you have been approved to donate. I would be happy to review any specific details of your evaluation testing with you and to discuss the next steps in this process. Thank you.   Sincerely,   Charo Rivas RN, Living Donor Coordinator  Living Donor Program  [facility], [facility]  Tele: [phone] or [phone], ext. [phone]

## 2022-11-11 ENCOUNTER — TELEPHONE (OUTPATIENT)
Dept: TRANSPLANT | Facility: CLINIC | Age: 32
End: 2022-11-11

## 2022-11-11 NOTE — TELEPHONE ENCOUNTER
"Received call back from Lzizeth-- she reports that she spoke to her employer ([employer]) about taking a leave for donation. She reports that she has access to a program called \"salary continuation\" that she can continue to get fully paid for an extended amount of time without using PTO. This program resets if she returns to work for 26 weeks. She is debating using this program or donor shield. SW discussed this with her and encouraged her to meet with her HR and ensure there are no limitations to this program at work. She was agreeable to do so and continue to work with SW to determine best plan.     Allie Fletcher, [facility] HospitalNELSON, CCTSW   Living Donor   [facility], [facility], [facility]  Direct: [phone]  E-Mail: [email or web address]    "

## 2022-11-16 ENCOUNTER — APPOINTMENT (OUTPATIENT)
Dept: LAB | Facility: CLINIC | Age: 32
End: 2022-11-16
Payer: COMMERCIAL

## 2022-11-25 ENCOUNTER — DOCUMENTATION ONLY (OUTPATIENT)
Dept: TRANSPLANT | Facility: CLINIC | Age: 32
End: 2022-11-25

## 2022-11-25 NOTE — CONFIDENTIAL NOTE
Pharmacy Living Kidney Donor Pre-Surgery Medication Evaluation     This patient is a 32 year old female being considered for living kidney donation.  As part of the donor pre-surgery patient evaluation, pharmacy has screened this patient's electronic medical record for medication related concerns.      Assessment / Plan    The following medication related issues may be of possible concern for this patient post surgery, based on the medical record medication list review.     Oral Contraceptives- Generally continue, but weigh surgery-related VTE risk vs risk of pregnancy. If the decision is made to hold this, stop four to six weeks prior to surgery.    Pharmacy will continue to participate in this patient's care throughout the surgery course. Please contact pharmacy with any further medication related questions or concerns.     Salena Arreguin [facility]  [phone]

## 2022-12-12 DIAGNOSIS — Z00.5 EXAMINATION OF POTENTIAL DONOR OF ORGAN AND TISSUE: Primary | ICD-10-CM

## 2022-12-12 DIAGNOSIS — Z52.4 ENCOUNTER FOR DONATION OF KIDNEY: ICD-10-CM

## 2022-12-14 ENCOUNTER — TELEPHONE (OUTPATIENT)
Dept: TRANSPLANT | Facility: CLINIC | Age: 32
End: 2022-12-14

## 2022-12-14 NOTE — TELEPHONE ENCOUNTER
Writer calling Lizzeth to let her know of the match offer for 1/3/22. Discussed pre-op dates/times. Let Lizzeth know that things can still change and I will reach out asap if they do. Let Lizzeth verbalized understanding and is in agreement with plan.

## 2022-12-14 NOTE — LETTER
Lizzeth Alarcon  [address]  [facility] [address]            December 14, 2022        Dear Lizzeth,         You have been scheduled for kidney donation surgery on 01/03/2023     On the day of surgery, please report to [address], at [facility], [address] [place]  [address], at 5:30 a.m.       Please note: COVID 19 testing is required prior to your surgery.      You will have 2 COVID tests before surgery. Please strictly follow COVID19 safety guidelines and minimize unnecessary contact with others 14 days prior to your surgery to minimize your potential for exposure. A positive COVID19 test will result in postponement of surgery.       Please stay up to date on [employer] Guidelines and visitor restrictions for COVID 19 at [email or web address].      Your pre-op visits:     12/19/2022 starting at 2:00pm at the [address] [address]- for a Covid PCR test, and Labs- You do not need to fast for these labs.  Then proceed to [facility] on the 3rd floor, 3A for Nurse Practitioner, Surgeon, and Coordinator visits.         01/02/2023 starting at 7:45am at the [address] [address]  ? COVID test #2 and Chest X-ray   ? Please report to the Lab/Imaging area on the 1st Floor of the [facility].  You do not need to fast.  After getting your COVID test and Chest X-ray you may leave.                   Post op Appointment:     01/23/23 starting at 1:45pm with Dr Moran at the [address] [address].  You will be seen in [facility] on the 3rd floor, 3A.        If you have a Living Will or Power of  appointed, please bring a copy of this paperwork with you. If you do not, please contact your donor  by calling  [phone].        Some additional restrictions before surgery:    Do not take any aspirin, ibuprofen, or similar medications one week prior to surgery.    Women who are on hormone replacement therapy or birth control pills should stop these medications one month prior to surgery.  You may resume them one month after surgery. Please use an alternative form of birth control.    Please contact us if you develop symptoms of a cold or any infection in the two weeks prior to surgery.    Do not obtain the flu shot or any other immunizations 72 hours prior to your surgery date.     If you have any questions or concerns, please contact [facility] at [phone].  We are available Monday through Friday, from 6:00 a.m. to 4:30 p.m.     Thank you for choosing the [facility] to help you donate the Gift of Life.     Sincerely,     Charo Rivas RN, BSN  Living Donor Coordinator  [phone]

## 2022-12-18 LAB
ABO/RH(D): NORMAL
ANTIBODY SCREEN: NEGATIVE
SPECIMEN EXPIRATION DATE: NORMAL

## 2022-12-19 ENCOUNTER — OFFICE VISIT (OUTPATIENT)
Dept: TRANSPLANT | Facility: CLINIC | Age: 32
End: 2022-12-19
Attending: TRANSPLANT SURGERY
Payer: COMMERCIAL

## 2022-12-19 ENCOUNTER — LAB (OUTPATIENT)
Dept: LAB | Facility: CLINIC | Age: 32
End: 2022-12-19
Payer: COMMERCIAL

## 2022-12-19 ENCOUNTER — ALLIED HEALTH/NURSE VISIT (OUTPATIENT)
Dept: TRANSPLANT | Facility: CLINIC | Age: 32
End: 2022-12-19
Attending: TRANSPLANT SURGERY

## 2022-12-19 VITALS
DIASTOLIC BLOOD PRESSURE: 85 MMHG | OXYGEN SATURATION: 100 % | BODY MASS INDEX: 27.47 KG/M2 | SYSTOLIC BLOOD PRESSURE: 140 MMHG | HEART RATE: 96 BPM | WEIGHT: 175 LBS

## 2022-12-19 DIAGNOSIS — Z00.5 EXAMINATION OF POTENTIAL DONOR OF ORGAN AND TISSUE: ICD-10-CM

## 2022-12-19 DIAGNOSIS — Z52.4 ENCOUNTER FOR DONATION OF KIDNEY: ICD-10-CM

## 2022-12-19 DIAGNOSIS — Z52.4 ENCOUNTER FOR DONATION OF KIDNEY: Primary | ICD-10-CM

## 2022-12-19 LAB
ALBUMIN UR-MCNC: NEGATIVE MG/DL
APPEARANCE UR: CLEAR
BILIRUB UR QL STRIP: NEGATIVE
COLOR UR AUTO: NORMAL
CREAT SERPL-MCNC: 0.75 MG/DL (ref 0.51–0.95)
GFR SERPL CREATININE-BSD FRML MDRD: >90 ML/MIN/1.73M2
GLUCOSE UR STRIP-MCNC: NEGATIVE MG/DL
HCG INTACT+B SERPL-ACNC: <1 MIU/ML
HGB BLD-MCNC: 12.1 G/DL (ref 11.7–15.7)
HGB UR QL STRIP: NEGATIVE
KETONES UR STRIP-MCNC: NEGATIVE MG/DL
LEUKOCYTE ESTERASE UR QL STRIP: NEGATIVE
NITRATE UR QL: NEGATIVE
PH UR STRIP: 7 [PH] (ref 5–7)
RBC URINE: <1 /HPF
SARS-COV-2 RNA RESP QL NAA+PROBE: NEGATIVE
SP GR UR STRIP: 1 (ref 1–1.03)
SQUAMOUS EPITHELIAL: 1 /HPF
UROBILINOGEN UR STRIP-MCNC: NORMAL MG/DL
WBC URINE: 0 /HPF

## 2022-12-19 PROCEDURE — 87535 HIV-1 PROBE&REVERSE TRNSCRPJ: CPT | Mod: 90 | Performed by: PATHOLOGY

## 2022-12-19 PROCEDURE — 86850 RBC ANTIBODY SCREEN: CPT | Mod: 90 | Performed by: PATHOLOGY

## 2022-12-19 PROCEDURE — 81001 URINALYSIS AUTO W/SCOPE: CPT | Performed by: PATHOLOGY

## 2022-12-19 PROCEDURE — G0463 HOSPITAL OUTPT CLINIC VISIT: HCPCS | Performed by: TRANSPLANT SURGERY

## 2022-12-19 PROCEDURE — 99213 OFFICE O/P EST LOW 20 MIN: CPT | Performed by: NURSE PRACTITIONER

## 2022-12-19 PROCEDURE — 86900 BLOOD TYPING SEROLOGIC ABO: CPT | Mod: 90 | Performed by: PATHOLOGY

## 2022-12-19 PROCEDURE — 36415 COLL VENOUS BLD VENIPUNCTURE: CPT | Performed by: PATHOLOGY

## 2022-12-19 PROCEDURE — 86901 BLOOD TYPING SEROLOGIC RH(D): CPT | Mod: 90 | Performed by: PATHOLOGY

## 2022-12-19 PROCEDURE — U0003 INFECTIOUS AGENT DETECTION BY NUCLEIC ACID (DNA OR RNA); SEVERE ACUTE RESPIRATORY SYNDROME CORONAVIRUS 2 (SARS-COV-2) (CORONAVIRUS DISEASE [COVID-19]), AMPLIFIED PROBE TECHNIQUE, MAKING USE OF HIGH THROUGHPUT TECHNOLOGIES AS DESCRIBED BY CMS-2020-01-R: HCPCS | Mod: 90 | Performed by: PATHOLOGY

## 2022-12-19 PROCEDURE — 87516 HEPATITIS B DNA AMP PROBE: CPT | Mod: 90 | Performed by: PATHOLOGY

## 2022-12-19 PROCEDURE — 99214 OFFICE O/P EST MOD 30 MIN: CPT | Performed by: TRANSPLANT SURGERY

## 2022-12-19 PROCEDURE — U0005 INFEC AGEN DETEC AMPLI PROBE: HCPCS | Mod: 90 | Performed by: PATHOLOGY

## 2022-12-19 PROCEDURE — 87521 HEPATITIS C PROBE&RVRS TRNSC: CPT | Mod: 90 | Performed by: PATHOLOGY

## 2022-12-19 PROCEDURE — 99000 SPECIMEN HANDLING OFFICE-LAB: CPT | Performed by: PATHOLOGY

## 2022-12-19 PROCEDURE — 87798 DETECT AGENT NOS DNA AMP: CPT | Mod: 90 | Performed by: PATHOLOGY

## 2022-12-19 PROCEDURE — 85018 HEMOGLOBIN: CPT | Performed by: PATHOLOGY

## 2022-12-19 PROCEDURE — 84702 CHORIONIC GONADOTROPIN TEST: CPT | Performed by: PATHOLOGY

## 2022-12-19 PROCEDURE — 82565 ASSAY OF CREATININE: CPT | Performed by: PATHOLOGY

## 2022-12-19 NOTE — NURSING NOTE
Chief Complaint   Patient presents with     Pre-Op Exam     Day minus 15 kidney donor   Blood pressure (!) 140/85, pulse 96, weight 79.4 kg (175 lb), SpO2 100 %, not currently breastfeeding.    Madeline Dyer, CMA

## 2022-12-19 NOTE — PROGRESS NOTES
Transplant Surgery      Reason For Visit: preop for lap donor nephrectomy    History of Present Illness:  Patient has undergone thorough preop evaluation and been found suitable for kidney donation.      I reviewed CT imaging and seems appropriate for left lap donor nephrectomy.       Exam:   BP (!) 140/85   Pulse 96   Wt 79.4 kg (175 lb)   SpO2 100%   BMI 27.47 kg/m    Thin female, no apparent distress    Impression:  Left lap donor nephrectomy -- hand assist    Plan: NP to do H&P.  Otherwise left lap donor nephrectomy    Risks, benefits, and alternative discussed in detail.  Patient wishes to proceed.      Arron Moran MD, PhD  Transplant Surgery    TT:  31 min

## 2022-12-19 NOTE — LETTER
12/19/2022         RE: Lizzeth Alarcon  [address]  [facility] [address]        Dear Colleague,    Thank you for referring your patient, Lizzeth Alarcon, to the [facility]. Please see a copy of my visit note below.      Transplant Surgery      Reason For Visit: preop for lap donor nephrectomy    History of Present Illness:  Patient has undergone thorough preop evaluation and been found suitable for kidney donation.      I reviewed CT imaging and seems appropriate for left lap donor nephrectomy.       Exam:   BP (!) 140/85   Pulse 96   Wt 79.4 kg (175 lb)   SpO2 100%   BMI 27.47 kg/m    Thin female, no apparent distress    Impression:  Left lap donor nephrectomy -- hand assist    Plan: NP to do H&P.  Otherwise left lap donor nephrectomy    Risks, benefits, and alternative discussed in detail.  Patient wishes to proceed.      Arron Moran MD, PhD  Transplant Surgery    TT:  31 min

## 2022-12-19 NOTE — PROGRESS NOTES
Transplant Surgery H&P                                                        HPI:                                                      Ms. Alarcon is a 32 year old female who comes to clinic today for preop prior to planned laparoscopic kidney donation surgery. The patient was previously reviewed by the living donor multidisciplinary selection committee and found to be medically and psychosocially appropriate for voluntary kidney donation. The patient denies any feelings of being pressured to proceed with kidney donation.  Health events since donor evaluation: None    Special considerations:   Constipation: no  PONV: no  History of Urinary retention? no  Significant neck/back/joint concerns for lateral decubitus positioning?: No  Zoroastrianism: No    MEDICAL HISTORY:                                                      Patient Active Problem List    Diagnosis Date Noted     Transplant donor evaluation 09/27/2022     Priority: Medium     Migraine without aura and without status migrainosus, not intractable 02/10/2021     Priority: Medium     BMI > 25 08/18/2017     Priority: Medium     Keloid scar 06/26/2014     Priority: Medium     From acne lesions, on chest and left upper back.        Contraception 06/26/2014     Priority: Medium     Acne      Priority: Medium     Contraceptive management      Priority: Medium      Past Medical History:   Diagnosis Date     Acne      Closed fracture of olecranon process of ulna 04/2006    left olecranon fracture     Contraceptive management 2008     Exercise-induced asthma      Migraine headache      Past Surgical History:   Procedure Laterality Date     LASIK Bilateral      WISDOM TOOTH EXTRACTION       Current Outpatient Medications   Medication Sig Dispense Refill     ENSKYCE 0.15-30 MG-MCG tablet   0     spironolactone (ALDACTONE) 100 MG tablet TAKE 2 TABLETS DAILY (Patient not taking: Reported on 12/19/2022) 180 tablet 2     SUMAtriptan (IMITREX) 25 MG tablet Take 1  tablet (25 mg) by mouth at onset of headache for migraine May repeat in 2 hours. Max 4 tablets/24 hours. 30 tablet 0     OTC products: None, except as noted above  Allergies   Allergen Reactions     Minocycline      Joint pains      Social History     Tobacco Use     Smoking status: Never     Smokeless tobacco: Never     Tobacco comments:     no second hand smoke   Substance Use Topics     Alcohol use: Yes     Alcohol/week: 5.0 - 7.0 standard drinks     Types: 5 - 7 Standard drinks or equivalent per week     History   Drug Use No       REVIEW OF SYSTEMS:                                                    CONSTITUTIONAL: NEGATIVE for fever, chills, change in weight  INTEGUMENTARY/SKIN: NEGATIVE for worrisome rashes, moles or lesions  EYES: NEGATIVE for vision changes or irritation  ENT/MOUTH: NEGATIVE for ear, mouth and throat problems  RESP: NEGATIVE for significant cough or SOB  CV: NEGATIVE for chest pain, palpitations or peripheral edema  GI: NEGATIVE for nausea, abdominal pain, heartburn, or change in bowel habits  : NEGATIVE for frequency, dysuria, or hematuria  MUSCULOSKELETAL: NEGATIVE for significant arthralgias or myalgia  NEURO: NEGATIVE for weakness, dizziness or paresthesias  ENDOCRINE: NEGATIVE for temperature intolerance, skin/hair changes  HEME: NEGATIVE for bleeding problems  PSYCHIATRIC: NEGATIVE for changes in mood or affect    EXAM:                                                    There were no vitals taken for this visit.    GENERAL APPEARANCE: healthy, alert and no distress     EYES: EOMI, PERRL     HENT: ear canals and TM's normal and nose and mouth without ulcers or lesions     NECK: no adenopathy, no asymmetry, masses, or scars and thyroid normal to palpation     RESP: lungs clear to auscultation - no rales, rhonchi or wheezes     CV: regular rates and rhythm, normal S1 S2, no S3 or S4 and no murmur, click or rub     ABDOMEN:  soft, nontender, no HSM or masses and bowel sounds normal      MS: extremities normal- no gross deformities noted, no evidence of inflammation in joints, FROM in all extremities.     SKIN: no suspicious lesions or rashes     NEURO: Normal strength and tone, sensory exam grossly normal, mentation intact and speech normal     PSYCH: mentation appears normal. and affect normal/bright     LYMPHATICS: No cervical adenopathy    DIAGNOSTICS:                                                      EKG: appears normal, NSR  Chest XRay  Labs Resulted Today:   Results for orders placed or performed in visit on 12/19/22   HCG quantitative pregnancy     Status: Normal   Result Value Ref Range    hCG Quantitative <1 <5 mIU/mL   UA with Microscopic reflex to Culture     Status: Normal    Specimen: Urine, NOS   Result Value Ref Range    Color Urine Straw Colorless, Straw, Light Yellow, Yellow    Appearance Urine Clear Clear    Glucose Urine Negative Negative mg/dL    Bilirubin Urine Negative Negative    Ketones Urine Negative Negative mg/dL    Specific Gravity Urine 1.005 1.003 - 1.035    Blood Urine Negative Negative    pH Urine 7.0 5.0 - 7.0    Protein Albumin Urine Negative Negative mg/dL    Urobilinogen Urine Normal Normal, 2.0 mg/dL    Nitrite Urine Negative Negative    Leukocyte Esterase Urine Negative Negative    RBC Urine <1 <=2 /HPF    WBC Urine 0 <=5 /HPF    Squamous Epithelials Urine 1 <=1 /HPF    Narrative    Urine Culture not indicated   Hemoglobin     Status: Normal   Result Value Ref Range    Hemoglobin 12.1 11.7 - 15.7 g/dL   Creatinine     Status: Normal   Result Value Ref Range    Creatinine 0.75 0.51 - 0.95 mg/dL    GFR Estimate >90 >60 mL/min/1.73m2   ABO/Rh type and screen     Status: None (In process)    Narrative    The following orders were created for panel order ABO/Rh type and screen.  Procedure                               Abnormality         Status                     ---------                               -----------         ------                     Adult Type  and Screen[[id number]]                            In process                   Please view results for these tests on the individual orders.     Recent Labs   Lab Test 12/19/22  1406 10/28/22  0732 10/14/19  0951 07/22/19  1619   HGB 12.1 13.7 14.3  --    PLT  --  278 235  --    INR  --  0.96  --   --    NA  --  137  --   --    POTASSIUM  --  4.0  --  4.1   CR 0.75 0.94  --  0.74   A1C  --  5.2  --   --       Assessment:                                                    Healthy voluntary kidney donor    There have been no significant intercurrent medical problems or change of intent in proceeding with kidney donation as scheduled on 1/3/23.    1. Labs reviewed and within normal limits: Yes  2. EKG (10/28/22): appears normal  3. Paired Exchange case: No  4. ABO= O NEG  5. Laterality: left  6. Outstanding issues: Final COVID screen and CXR pending     Plan:                                                      1. Consent: Done  2. Outstanding issues: Final COID screen and CXR pending     Signed Electronically by: ALYSSA Boggs CNP

## 2022-12-19 NOTE — LETTER
12/19/2022         RE: Lizzeth Alarcon  [address]  [facility] [address]        Dear Colleague,    Thank you for referring your patient, Lizzeth Alarcon, to the [facility]. Please see a copy of my visit note below.    Transplant Surgery H&P                                                        HPI:                                                      Ms. Alarcon is a 32 year old female who comes to clinic today for preop prior to planned laparoscopic kidney donation surgery. The patient was previously reviewed by the living donor multidisciplinary selection committee and found to be medically and psychosocially appropriate for voluntary kidney donation. The patient denies any feelings of being pressured to proceed with kidney donation.  Health events since donor evaluation: None    Special considerations:   Constipation: no  PONV: no  History of Urinary retention? no  Significant neck/back/joint concerns for lateral decubitus positioning?: No  Amish: No    MEDICAL HISTORY:                                                      Patient Active Problem List    Diagnosis Date Noted     Transplant donor evaluation 09/27/2022     Priority: Medium     Migraine without aura and without status migrainosus, not intractable 02/10/2021     Priority: Medium     BMI > 25 08/18/2017     Priority: Medium     Keloid scar 06/26/2014     Priority: Medium     From acne lesions, on chest and left upper back.        Contraception 06/26/2014     Priority: Medium     Acne      Priority: Medium     Contraceptive management      Priority: Medium      Past Medical History:   Diagnosis Date     Acne      Closed fracture of olecranon process of ulna 04/2006    left olecranon fracture     Contraceptive management 2008     Exercise-induced asthma      Migraine headache      Past Surgical History:   Procedure Laterality Date     LASIK Bilateral      WISDOM TOOTH EXTRACTION       Current  Outpatient Medications   Medication Sig Dispense Refill     ENSKYCE 0.15-30 MG-MCG tablet   0     spironolactone (ALDACTONE) 100 MG tablet TAKE 2 TABLETS DAILY (Patient not taking: Reported on 12/19/2022) 180 tablet 2     SUMAtriptan (IMITREX) 25 MG tablet Take 1 tablet (25 mg) by mouth at onset of headache for migraine May repeat in 2 hours. Max 4 tablets/24 hours. 30 tablet 0     OTC products: None, except as noted above  Allergies   Allergen Reactions     Minocycline      Joint pains      Social History     Tobacco Use     Smoking status: Never     Smokeless tobacco: Never     Tobacco comments:     no second hand smoke   Substance Use Topics     Alcohol use: Yes     Alcohol/week: 5.0 - 7.0 standard drinks     Types: 5 - 7 Standard drinks or equivalent per week     History   Drug Use No       REVIEW OF SYSTEMS:                                                    CONSTITUTIONAL: NEGATIVE for fever, chills, change in weight  INTEGUMENTARY/SKIN: NEGATIVE for worrisome rashes, moles or lesions  EYES: NEGATIVE for vision changes or irritation  ENT/MOUTH: NEGATIVE for ear, mouth and throat problems  RESP: NEGATIVE for significant cough or SOB  CV: NEGATIVE for chest pain, palpitations or peripheral edema  GI: NEGATIVE for nausea, abdominal pain, heartburn, or change in bowel habits  : NEGATIVE for frequency, dysuria, or hematuria  MUSCULOSKELETAL: NEGATIVE for significant arthralgias or myalgia  NEURO: NEGATIVE for weakness, dizziness or paresthesias  ENDOCRINE: NEGATIVE for temperature intolerance, skin/hair changes  HEME: NEGATIVE for bleeding problems  PSYCHIATRIC: NEGATIVE for changes in mood or affect    EXAM:                                                    There were no vitals taken for this visit.    GENERAL APPEARANCE: healthy, alert and no distress     EYES: EOMI, PERRL     HENT: ear canals and TM's normal and nose and mouth without ulcers or lesions     NECK: no adenopathy, no asymmetry, masses, or scars  and thyroid normal to palpation     RESP: lungs clear to auscultation - no rales, rhonchi or wheezes     CV: regular rates and rhythm, normal S1 S2, no S3 or S4 and no murmur, click or rub     ABDOMEN:  soft, nontender, no HSM or masses and bowel sounds normal     MS: extremities normal- no gross deformities noted, no evidence of inflammation in joints, FROM in all extremities.     SKIN: no suspicious lesions or rashes     NEURO: Normal strength and tone, sensory exam grossly normal, mentation intact and speech normal     PSYCH: mentation appears normal. and affect normal/bright     LYMPHATICS: No cervical adenopathy    DIAGNOSTICS:                                                      EKG: appears normal, NSR  Chest XRay  Labs Resulted Today:   Results for orders placed or performed in visit on 12/19/22   HCG quantitative pregnancy     Status: Normal   Result Value Ref Range    hCG Quantitative <1 <5 mIU/mL   UA with Microscopic reflex to Culture     Status: Normal    Specimen: Urine, NOS   Result Value Ref Range    Color Urine Straw Colorless, Straw, Light Yellow, Yellow    Appearance Urine Clear Clear    Glucose Urine Negative Negative mg/dL    Bilirubin Urine Negative Negative    Ketones Urine Negative Negative mg/dL    Specific Gravity Urine 1.005 1.003 - 1.035    Blood Urine Negative Negative    pH Urine 7.0 5.0 - 7.0    Protein Albumin Urine Negative Negative mg/dL    Urobilinogen Urine Normal Normal, 2.0 mg/dL    Nitrite Urine Negative Negative    Leukocyte Esterase Urine Negative Negative    RBC Urine <1 <=2 /HPF    WBC Urine 0 <=5 /HPF    Squamous Epithelials Urine 1 <=1 /HPF    Narrative    Urine Culture not indicated   Hemoglobin     Status: Normal   Result Value Ref Range    Hemoglobin 12.1 11.7 - 15.7 g/dL   Creatinine     Status: Normal   Result Value Ref Range    Creatinine 0.75 0.51 - 0.95 mg/dL    GFR Estimate >90 >60 mL/min/1.73m2   ABO/Rh type and screen     Status: None (In process)    Narrative     The following orders were created for panel order ABO/Rh type and screen.  Procedure                               Abnormality         Status                     ---------                               -----------         ------                     Adult Type and Screen[[id number]]                            In process                   Please view results for these tests on the individual orders.     Recent Labs   Lab Test 12/19/22  1406 10/28/22  0732 10/14/19  0951 07/22/19  1619   HGB 12.1 13.7 14.3  --    PLT  --  278 235  --    INR  --  0.96  --   --    NA  --  137  --   --    POTASSIUM  --  4.0  --  4.1   CR 0.75 0.94  --  0.74   A1C  --  5.2  --   --       Assessment:                                                    Healthy voluntary kidney donor    There have been no significant intercurrent medical problems or change of intent in proceeding with kidney donation as scheduled on 1/3/23.    1. Labs reviewed and within normal limits: Yes  2. EKG (10/28/22): appears normal  3. Paired Exchange case: No  4. ABO= O NEG  5. Laterality: left  6. Outstanding issues: Final COVID screen and CXR pending     Plan:                                                      1. Consent: Done  2. Outstanding issues: Final COID screen and CXR pending     Signed Electronically by: ALYSSA Boggs CNP        Again, thank you for allowing me to participate in the care of your patient.        Sincerely,        ALYSSA Boggs CNP

## 2022-12-20 NOTE — NURSING NOTE
Saw donor in clinic for her Pre-Op visit.  I reviewed procedure for the surgery day.  She will be donating to an anonymous recipient.    Lizzeth came to pre op by herself.      I reviewed pain control medication that is typically prescribed including nerve block which Anesthesia will place preop.  I reviewed the ERAS protocol teaching sheet:  Encourage Activity.  Demonstrated and reviewed importance of Incentive Spirometry usage- I gave the patient an IS to bring home to practice and to bring to surgery.   I gave the patient two bottles of Ensure clear with instructions to drink at bedtime the night prior to surgery and again the morning of surgery when the patient awakens.  I reviewed instruction for no solid foods after 10 pm the night before surgery.  I reviewed NPO status including clear liquids begins at 05:30 on the morning of surgery.  I reviewed arrival time of 05:30 to 3C and Start time of surgery 07:30.    Pt will meet with Surgeon and Nurse Practitioner today to develop plan for surgery.  I gave the patient a donor blanket and mug.   I thanked the patient for all that she is doing to make this happen.  I reviewed that the donor's bills will be paid for by insurance of the matched recipient.    I reviewed that the patient has a right to withdraw from kidney donation at any time, for any reason.     I reviewed post op plan of care including timing for office visit with surgeon and when labs are needed.  The patient will go home after discharge and then return to [facility] for her 2 week visit.   We reviewed plan for blood draws post donation.     I reviewed both the donor and recipient ABO blood types, UNOS ID and placed source documents in folder for the day of surgery ABO verification by the surgical team.        Final XM results: collected and sent - pending  Serologies: collected and sent- pending

## 2022-12-22 LAB
HBV DNA SERPL QL NAA+PROBE: NORMAL
HCV RNA SERPL QL NAA+PROBE: NORMAL
HIV1+2 RNA SERPL QL NAA+PROBE: NORMAL
WNV RNA SERPL DONR QL NAA+PROBE: NORMAL

## 2022-12-30 ENCOUNTER — TELEPHONE (OUTPATIENT)
Dept: TRANSPLANT | Facility: CLINIC | Age: 32
End: 2022-12-30

## 2022-12-30 NOTE — TELEPHONE ENCOUNTER
SW called Lizzeth-- she states that she plans to use work benefits at her full salary during recovery from living donor surgery. No other questions at this time.     Allie Fletcher Northern Light Mayo HospitalNELSON, CCTSW   Living Donor   [facility]  Direct: [phone]  E-Mail: [email or web address]

## 2023-01-02 ENCOUNTER — LAB (OUTPATIENT)
Dept: LAB | Facility: CLINIC | Age: 33
End: 2023-01-02
Payer: COMMERCIAL

## 2023-01-02 ENCOUNTER — ANESTHESIA EVENT (OUTPATIENT)
Dept: SURGERY | Facility: CLINIC | Age: 33
DRG: 661 | End: 2023-01-02
Payer: COMMERCIAL

## 2023-01-02 ENCOUNTER — TELEPHONE (OUTPATIENT)
Dept: TRANSPLANT | Facility: CLINIC | Age: 33
End: 2023-01-02

## 2023-01-02 ENCOUNTER — ANCILLARY PROCEDURE (OUTPATIENT)
Dept: GENERAL RADIOLOGY | Facility: CLINIC | Age: 33
End: 2023-01-02
Payer: COMMERCIAL

## 2023-01-02 DIAGNOSIS — Z00.5 EXAMINATION OF POTENTIAL DONOR OF ORGAN AND TISSUE: ICD-10-CM

## 2023-01-02 LAB — SARS-COV-2 RNA RESP QL NAA+PROBE: NEGATIVE

## 2023-01-02 PROCEDURE — U0003 INFECTIOUS AGENT DETECTION BY NUCLEIC ACID (DNA OR RNA); SEVERE ACUTE RESPIRATORY SYNDROME CORONAVIRUS 2 (SARS-COV-2) (CORONAVIRUS DISEASE [COVID-19]), AMPLIFIED PROBE TECHNIQUE, MAKING USE OF HIGH THROUGHPUT TECHNOLOGIES AS DESCRIBED BY CMS-2020-01-R: HCPCS | Mod: 90 | Performed by: PATHOLOGY

## 2023-01-02 PROCEDURE — 71046 X-RAY EXAM CHEST 2 VIEWS: CPT | Mod: GC | Performed by: RADIOLOGY

## 2023-01-02 PROCEDURE — 99000 SPECIMEN HANDLING OFFICE-LAB: CPT | Performed by: PATHOLOGY

## 2023-01-02 PROCEDURE — U0005 INFEC AGEN DETEC AMPLI PROBE: HCPCS | Mod: 90 | Performed by: PATHOLOGY

## 2023-01-02 NOTE — TELEPHONE ENCOUNTER
Writer calling to check in with Lizzeth. Surgery is tomorrow. We discussed pre-op instructions, check in time, and reviewed all Lizzeth's questions. Lizzeth will check in tomorrow at 5:30am, no further questions at this time. Lizzeth verbalized understanding and is in agreement with plan.

## 2023-01-02 NOTE — ANESTHESIA PREPROCEDURE EVALUATION
Anesthesia Pre-Procedure Evaluation    Patient: Lizzeth Alarcon   MRN: [id number] : 1990        Procedure : Procedure(s):  Laparoscopic Hand Assisted Living Non-Directed Left Kidney Donor          Past Medical History:   Diagnosis Date     Acne      Closed fracture of olecranon process of ulna 2006    left olecranon fracture     Contraceptive management 2008     Exercise-induced asthma      Migraine headache       Past Surgical History:   Procedure Laterality Date     LASIK Bilateral      WISDOM TOOTH EXTRACTION        Allergies   Allergen Reactions     Minocycline      Joint pains      Social History     Tobacco Use     Smoking status: Never     Smokeless tobacco: Never     Tobacco comments:     no second hand smoke   Substance Use Topics     Alcohol use: Yes     Alcohol/week: 5.0 - 7.0 standard drinks     Types: 5 - 7 Standard drinks or equivalent per week      Wt Readings from Last 1 Encounters:   22 79.4 kg (175 lb)        Anesthesia Evaluation   Pt has had prior anesthetic.         ROS/MED HX  ENT/Pulmonary: Comment: Hx exercise-induced asthma, not on any inhalers      Neurologic: Comment: On Imitrex prn    (+) migraines,     Cardiovascular:  - neg cardiovascular ROS     METS/Exercise Tolerance: >4 METS    Hematologic: Comments: Hgb 12.1  - neg hematologic  ROS     Musculoskeletal:  - neg musculoskeletal ROS     GI/Hepatic:  - neg GI/hepatic ROS     Renal/Genitourinary:  - neg Renal ROS     Endo:  - neg endo ROS     Psychiatric/Substance Use: Comment: Alcohol 7 drinks/wk      Infectious Disease:  - neg infectious disease ROS     Malignancy:  - neg malignancy ROS     Other: Comment: hCG <1              OUTSIDE LABS:  CBC:   Lab Results   Component Value Date    WBC 8.0 10/28/2022    WBC 8.4 10/14/2019    HGB 12.1 2022    HGB 13.7 10/28/2022    HCT 41.0 10/28/2022    HCT 42.1 10/14/2019     10/28/2022     10/14/2019     BMP:   Lab Results   Component Value Date      10/28/2022    POTASSIUM 4.0 10/28/2022    POTASSIUM 4.1 07/22/2019    CHLORIDE 101 10/28/2022    CO2 25 10/28/2022    BUN 7.3 10/28/2022    CR 0.75 12/19/2022    CR 0.94 10/28/2022    GLC 86 10/28/2022    GLC 82 06/02/2022     COAGS:   Lab Results   Component Value Date    PTT 27 10/28/2022    INR 0.96 10/28/2022     POC: No results found for: BGM, HCG, HCGS  HEPATIC:   Lab Results   Component Value Date    ALBUMIN 4.5 10/28/2022    PROTTOTAL 7.7 10/28/2022    ALT 32 10/28/2022    AST 40 (H) 10/28/2022    ALKPHOS 55 10/28/2022    BILITOTAL 0.2 10/28/2022     OTHER:   Lab Results   Component Value Date    A1C 5.2 10/28/2022    WU 9.9 10/28/2022    PHOS 4.3 10/28/2022    TSH 1.22 01/15/2009       Anesthesia Plan    ASA Status:  2   NPO Status:  NPO Appropriate    Anesthesia Type: General.     - Airway: ETT   Induction: Intravenous.   Maintenance: Balanced.   Techniques and Equipment:     - Lines/Monitors: 2nd IV, BIS     Consents            Postoperative Care    Pain management: IV analgesics, Oral pain medications, Multi-modal analgesia.   PONV prophylaxis: Ondansetron (or other 5HT-3), Dexamethasone or Solumedrol     Comments:                Tk Polo MD

## 2023-01-03 ENCOUNTER — HOSPITAL ENCOUNTER (INPATIENT)
Facility: CLINIC | Age: 33
LOS: 1 days | Discharge: HOME | DRG: 661 | End: 2023-01-04
Attending: TRANSPLANT SURGERY | Admitting: TRANSPLANT SURGERY
Payer: COMMERCIAL

## 2023-01-03 ENCOUNTER — ANESTHESIA (OUTPATIENT)
Dept: SURGERY | Facility: CLINIC | Age: 33
DRG: 661 | End: 2023-01-03
Payer: COMMERCIAL

## 2023-01-03 DIAGNOSIS — L70.0 ACNE VULGARIS: ICD-10-CM

## 2023-01-03 DIAGNOSIS — Z52.4 ENCOUNTER FOR DONATION OF KIDNEY: Primary | ICD-10-CM

## 2023-01-03 DIAGNOSIS — G89.18 ACUTE POST-OPERATIVE PAIN: ICD-10-CM

## 2023-01-03 LAB
CK SERPL-CCNC: 87 U/L (ref 26–192)
ERYTHROCYTE [DISTWIDTH] IN BLOOD BY AUTOMATED COUNT: 13.2 % (ref 10–15)
GLUCOSE BLDC GLUCOMTR-MCNC: 92 MG/DL (ref 70–99)
HCT VFR BLD AUTO: 36.6 % (ref 35–47)
HGB BLD-MCNC: 12 G/DL (ref 11.7–15.7)
MCH RBC QN AUTO: 31.3 PG (ref 26.5–33)
MCHC RBC AUTO-ENTMCNC: 32.8 G/DL (ref 31.5–36.5)
MCV RBC AUTO: 95 FL (ref 78–100)
PLATELET # BLD AUTO: 229 10E3/UL (ref 150–450)
RBC # BLD AUTO: 3.84 10E6/UL (ref 3.8–5.2)
WBC # BLD AUTO: 12.8 10E3/UL (ref 4–11)

## 2023-01-03 PROCEDURE — 250N000011 HC RX IP 250 OP 636: Performed by: STUDENT IN AN ORGANIZED HEALTH CARE EDUCATION/TRAINING PROGRAM

## 2023-01-03 PROCEDURE — 120N000011 HC R&B TRANSPLANT UMMC

## 2023-01-03 PROCEDURE — 50547 LAPARO REMOVAL DONOR KIDNEY: CPT | Mod: LT | Performed by: TRANSPLANT SURGERY

## 2023-01-03 PROCEDURE — 258N000003 HC RX IP 258 OP 636: Performed by: STUDENT IN AN ORGANIZED HEALTH CARE EDUCATION/TRAINING PROGRAM

## 2023-01-03 PROCEDURE — 360N000077 HC SURGERY LEVEL 4, PER MIN: Performed by: TRANSPLANT SURGERY

## 2023-01-03 PROCEDURE — 250N000009 HC RX 250: Performed by: TRANSPLANT SURGERY

## 2023-01-03 PROCEDURE — 370N000017 HC ANESTHESIA TECHNICAL FEE, PER MIN: Performed by: TRANSPLANT SURGERY

## 2023-01-03 PROCEDURE — 250N000025 HC SEVOFLURANE, PER MIN: Performed by: TRANSPLANT SURGERY

## 2023-01-03 PROCEDURE — 85027 COMPLETE CBC AUTOMATED: CPT | Performed by: STUDENT IN AN ORGANIZED HEALTH CARE EDUCATION/TRAINING PROGRAM

## 2023-01-03 PROCEDURE — 250N000011 HC RX IP 250 OP 636

## 2023-01-03 PROCEDURE — 250N000013 HC RX MED GY IP 250 OP 250 PS 637: Performed by: PHYSICIAN ASSISTANT

## 2023-01-03 PROCEDURE — 710N000010 HC RECOVERY PHASE 1, LEVEL 2, PER MIN: Performed by: TRANSPLANT SURGERY

## 2023-01-03 PROCEDURE — 250N000009 HC RX 250: Performed by: STUDENT IN AN ORGANIZED HEALTH CARE EDUCATION/TRAINING PROGRAM

## 2023-01-03 PROCEDURE — 82550 ASSAY OF CK (CPK): CPT | Performed by: STUDENT IN AN ORGANIZED HEALTH CARE EDUCATION/TRAINING PROGRAM

## 2023-01-03 PROCEDURE — 250N000011 HC RX IP 250 OP 636: Performed by: ANESTHESIOLOGY

## 2023-01-03 PROCEDURE — 250N000013 HC RX MED GY IP 250 OP 250 PS 637: Performed by: STUDENT IN AN ORGANIZED HEALTH CARE EDUCATION/TRAINING PROGRAM

## 2023-01-03 PROCEDURE — 250N000011 HC RX IP 250 OP 636: Performed by: PHYSICIAN ASSISTANT

## 2023-01-03 PROCEDURE — C9290 INJ, BUPIVACAINE LIPOSOME: HCPCS | Performed by: ANESTHESIOLOGY

## 2023-01-03 PROCEDURE — 0TT14ZZ RESECTION OF LEFT KIDNEY, PERCUTANEOUS ENDOSCOPIC APPROACH: ICD-10-PCS | Performed by: TRANSPLANT SURGERY

## 2023-01-03 PROCEDURE — 272N000001 HC OR GENERAL SUPPLY STERILE: Performed by: TRANSPLANT SURGERY

## 2023-01-03 PROCEDURE — 999N000141 HC STATISTIC PRE-PROCEDURE NURSING ASSESSMENT: Performed by: TRANSPLANT SURGERY

## 2023-01-03 RX ORDER — HEPARIN SODIUM 5000 [USP'U]/.5ML
5000 INJECTION, SOLUTION INTRAVENOUS; SUBCUTANEOUS 3 TIMES DAILY
Status: DISCONTINUED | OUTPATIENT
Start: 2023-01-03 | End: 2023-01-04 | Stop reason: HOSPADM

## 2023-01-03 RX ORDER — CEFUROXIME SODIUM 1.5 G/16ML
1.5 INJECTION, POWDER, FOR SOLUTION INTRAVENOUS EVERY 8 HOURS
Status: DISCONTINUED | OUTPATIENT
Start: 2023-01-03 | End: 2023-01-03

## 2023-01-03 RX ORDER — LABETALOL HYDROCHLORIDE 5 MG/ML
10 INJECTION, SOLUTION INTRAVENOUS
Status: DISCONTINUED | OUTPATIENT
Start: 2023-01-03 | End: 2023-01-03 | Stop reason: HOSPADM

## 2023-01-03 RX ORDER — DEXTROSE, SODIUM CHLORIDE, SODIUM LACTATE, POTASSIUM CHLORIDE, AND CALCIUM CHLORIDE 5; .6; .31; .03; .02 G/100ML; G/100ML; G/100ML; G/100ML; G/100ML
INJECTION, SOLUTION INTRAVENOUS CONTINUOUS
Status: DISCONTINUED | OUTPATIENT
Start: 2023-01-03 | End: 2023-01-04 | Stop reason: HOSPADM

## 2023-01-03 RX ORDER — SODIUM CHLORIDE, SODIUM LACTATE, POTASSIUM CHLORIDE, CALCIUM CHLORIDE 600; 310; 30; 20 MG/100ML; MG/100ML; MG/100ML; MG/100ML
INJECTION, SOLUTION INTRAVENOUS CONTINUOUS PRN
Status: DISCONTINUED | OUTPATIENT
Start: 2023-01-03 | End: 2023-01-03

## 2023-01-03 RX ORDER — OXYCODONE HYDROCHLORIDE 5 MG/1
5-10 TABLET ORAL
Status: DISCONTINUED | OUTPATIENT
Start: 2023-01-03 | End: 2023-01-04 | Stop reason: HOSPADM

## 2023-01-03 RX ORDER — ACETAMINOPHEN 325 MG/1
975 TABLET ORAL ONCE
Status: COMPLETED | OUTPATIENT
Start: 2023-01-03 | End: 2023-01-03

## 2023-01-03 RX ORDER — METHOCARBAMOL 500 MG/1
500 TABLET, FILM COATED ORAL 4 TIMES DAILY PRN
Status: DISCONTINUED | OUTPATIENT
Start: 2023-01-03 | End: 2023-01-03

## 2023-01-03 RX ORDER — CEFUROXIME SODIUM 1.5 G/16ML
INJECTION, POWDER, FOR SOLUTION INTRAVENOUS PRN
Status: DISCONTINUED | OUTPATIENT
Start: 2023-01-03 | End: 2023-01-03

## 2023-01-03 RX ORDER — FAMOTIDINE 20 MG/1
20 TABLET, FILM COATED ORAL DAILY
Status: DISCONTINUED | OUTPATIENT
Start: 2023-01-03 | End: 2023-01-04 | Stop reason: HOSPADM

## 2023-01-03 RX ORDER — KETOROLAC TROMETHAMINE 30 MG/ML
INJECTION, SOLUTION INTRAMUSCULAR; INTRAVENOUS PRN
Status: DISCONTINUED | OUTPATIENT
Start: 2023-01-03 | End: 2023-01-03

## 2023-01-03 RX ORDER — ESMOLOL HYDROCHLORIDE 10 MG/ML
INJECTION INTRAVENOUS PRN
Status: DISCONTINUED | OUTPATIENT
Start: 2023-01-03 | End: 2023-01-03

## 2023-01-03 RX ORDER — SODIUM CHLORIDE, SODIUM LACTATE, POTASSIUM CHLORIDE, CALCIUM CHLORIDE 600; 310; 30; 20 MG/100ML; MG/100ML; MG/100ML; MG/100ML
INJECTION, SOLUTION INTRAVENOUS CONTINUOUS
Status: DISCONTINUED | OUTPATIENT
Start: 2023-01-03 | End: 2023-01-03 | Stop reason: HOSPADM

## 2023-01-03 RX ORDER — AMOXICILLIN 250 MG
1 CAPSULE ORAL 2 TIMES DAILY
Status: DISCONTINUED | OUTPATIENT
Start: 2023-01-03 | End: 2023-01-04 | Stop reason: HOSPADM

## 2023-01-03 RX ORDER — GABAPENTIN 300 MG/1
300 CAPSULE ORAL ONCE
Status: DISCONTINUED | OUTPATIENT
Start: 2023-01-03 | End: 2023-01-03 | Stop reason: HOSPADM

## 2023-01-03 RX ORDER — ACETAMINOPHEN 325 MG/1
650 TABLET ORAL EVERY 6 HOURS
Status: DISCONTINUED | OUTPATIENT
Start: 2023-01-03 | End: 2023-01-04 | Stop reason: HOSPADM

## 2023-01-03 RX ORDER — METHOCARBAMOL 500 MG/1
500 TABLET, FILM COATED ORAL 4 TIMES DAILY
Status: DISCONTINUED | OUTPATIENT
Start: 2023-01-03 | End: 2023-01-04 | Stop reason: HOSPADM

## 2023-01-03 RX ORDER — FENTANYL CITRATE 50 UG/ML
50 INJECTION, SOLUTION INTRAMUSCULAR; INTRAVENOUS EVERY 5 MIN PRN
Status: DISCONTINUED | OUTPATIENT
Start: 2023-01-03 | End: 2023-01-03 | Stop reason: HOSPADM

## 2023-01-03 RX ORDER — SODIUM CHLORIDE, SODIUM GLUCONATE, SODIUM ACETATE, POTASSIUM CHLORIDE AND MAGNESIUM CHLORIDE 526; 502; 368; 37; 30 MG/100ML; MG/100ML; MG/100ML; MG/100ML; MG/100ML
INJECTION, SOLUTION INTRAVENOUS CONTINUOUS PRN
Status: DISCONTINUED | OUTPATIENT
Start: 2023-01-03 | End: 2023-01-03

## 2023-01-03 RX ORDER — ONDANSETRON 2 MG/ML
INJECTION INTRAMUSCULAR; INTRAVENOUS PRN
Status: DISCONTINUED | OUTPATIENT
Start: 2023-01-03 | End: 2023-01-03

## 2023-01-03 RX ORDER — FENTANYL CITRATE 50 UG/ML
25 INJECTION, SOLUTION INTRAMUSCULAR; INTRAVENOUS EVERY 5 MIN PRN
Status: DISCONTINUED | OUTPATIENT
Start: 2023-01-03 | End: 2023-01-03 | Stop reason: HOSPADM

## 2023-01-03 RX ORDER — ONDANSETRON 4 MG/1
4 TABLET, ORALLY DISINTEGRATING ORAL EVERY 30 MIN PRN
Status: DISCONTINUED | OUTPATIENT
Start: 2023-01-03 | End: 2023-01-03 | Stop reason: HOSPADM

## 2023-01-03 RX ORDER — NALOXONE HYDROCHLORIDE 0.4 MG/ML
0.2 INJECTION, SOLUTION INTRAMUSCULAR; INTRAVENOUS; SUBCUTANEOUS
Status: DISCONTINUED | OUTPATIENT
Start: 2023-01-03 | End: 2023-01-03 | Stop reason: HOSPADM

## 2023-01-03 RX ORDER — MAGNESIUM SULFATE HEPTAHYDRATE 40 MG/ML
2 INJECTION, SOLUTION INTRAVENOUS ONCE
Status: COMPLETED | OUTPATIENT
Start: 2023-01-03 | End: 2023-01-03

## 2023-01-03 RX ORDER — HYDROMORPHONE HCL IN WATER/PF 6 MG/30 ML
0.2 PATIENT CONTROLLED ANALGESIA SYRINGE INTRAVENOUS EVERY 5 MIN PRN
Status: DISCONTINUED | OUTPATIENT
Start: 2023-01-03 | End: 2023-01-03 | Stop reason: HOSPADM

## 2023-01-03 RX ORDER — MANNITOL 20 G/100ML
INJECTION, SOLUTION INTRAVENOUS PRN
Status: DISCONTINUED | OUTPATIENT
Start: 2023-01-03 | End: 2023-01-03

## 2023-01-03 RX ORDER — NALOXONE HYDROCHLORIDE 0.4 MG/ML
0.4 INJECTION, SOLUTION INTRAMUSCULAR; INTRAVENOUS; SUBCUTANEOUS
Status: DISCONTINUED | OUTPATIENT
Start: 2023-01-03 | End: 2023-01-03 | Stop reason: HOSPADM

## 2023-01-03 RX ORDER — ONDANSETRON 4 MG/1
4 TABLET, ORALLY DISINTEGRATING ORAL EVERY 6 HOURS PRN
Status: DISCONTINUED | OUTPATIENT
Start: 2023-01-03 | End: 2023-01-04 | Stop reason: HOSPADM

## 2023-01-03 RX ORDER — ONDANSETRON 2 MG/ML
4 INJECTION INTRAMUSCULAR; INTRAVENOUS EVERY 6 HOURS PRN
Status: DISCONTINUED | OUTPATIENT
Start: 2023-01-03 | End: 2023-01-04 | Stop reason: HOSPADM

## 2023-01-03 RX ORDER — PROPOFOL 10 MG/ML
INJECTION, EMULSION INTRAVENOUS PRN
Status: DISCONTINUED | OUTPATIENT
Start: 2023-01-03 | End: 2023-01-03

## 2023-01-03 RX ORDER — PROTAMINE SULFATE 10 MG/ML
50 INJECTION, SOLUTION INTRAVENOUS ONCE
Status: COMPLETED | OUTPATIENT
Start: 2023-01-03 | End: 2023-01-03

## 2023-01-03 RX ORDER — NALOXONE HYDROCHLORIDE 0.4 MG/ML
0.4 INJECTION, SOLUTION INTRAMUSCULAR; INTRAVENOUS; SUBCUTANEOUS
Status: DISCONTINUED | OUTPATIENT
Start: 2023-01-03 | End: 2023-01-04 | Stop reason: HOSPADM

## 2023-01-03 RX ORDER — GABAPENTIN 300 MG/1
300 CAPSULE ORAL
Status: COMPLETED | OUTPATIENT
Start: 2023-01-03 | End: 2023-01-03

## 2023-01-03 RX ORDER — MAGNESIUM SULFATE HEPTAHYDRATE 40 MG/ML
2 INJECTION, SOLUTION INTRAVENOUS ONCE
Status: DISCONTINUED | OUTPATIENT
Start: 2023-01-03 | End: 2023-01-03 | Stop reason: HOSPADM

## 2023-01-03 RX ORDER — METHOCARBAMOL 500 MG/1
500 TABLET, FILM COATED ORAL EVERY 6 HOURS PRN
Status: DISCONTINUED | OUTPATIENT
Start: 2023-01-03 | End: 2023-01-03

## 2023-01-03 RX ORDER — PROCHLORPERAZINE MALEATE 5 MG
10 TABLET ORAL EVERY 6 HOURS PRN
Status: DISCONTINUED | OUTPATIENT
Start: 2023-01-03 | End: 2023-01-04 | Stop reason: HOSPADM

## 2023-01-03 RX ORDER — ONDANSETRON 2 MG/ML
4 INJECTION INTRAMUSCULAR; INTRAVENOUS ONCE
Status: DISCONTINUED | OUTPATIENT
Start: 2023-01-03 | End: 2023-01-03 | Stop reason: HOSPADM

## 2023-01-03 RX ORDER — SODIUM CHLORIDE, SODIUM LACTATE, POTASSIUM CHLORIDE, CALCIUM CHLORIDE 600; 310; 30; 20 MG/100ML; MG/100ML; MG/100ML; MG/100ML
1-3 INJECTION, SOLUTION INTRAVENOUS CONTINUOUS
Status: DISCONTINUED | OUTPATIENT
Start: 2023-01-03 | End: 2023-01-03 | Stop reason: HOSPADM

## 2023-01-03 RX ORDER — ONDANSETRON 2 MG/ML
4 INJECTION INTRAMUSCULAR; INTRAVENOUS EVERY 30 MIN PRN
Status: DISCONTINUED | OUTPATIENT
Start: 2023-01-03 | End: 2023-01-03 | Stop reason: HOSPADM

## 2023-01-03 RX ORDER — NALOXONE HYDROCHLORIDE 0.4 MG/ML
0.2 INJECTION, SOLUTION INTRAMUSCULAR; INTRAVENOUS; SUBCUTANEOUS
Status: DISCONTINUED | OUTPATIENT
Start: 2023-01-03 | End: 2023-01-04 | Stop reason: HOSPADM

## 2023-01-03 RX ORDER — FLUMAZENIL 0.1 MG/ML
0.2 INJECTION, SOLUTION INTRAVENOUS
Status: DISCONTINUED | OUTPATIENT
Start: 2023-01-03 | End: 2023-01-03 | Stop reason: HOSPADM

## 2023-01-03 RX ORDER — LIDOCAINE HYDROCHLORIDE 20 MG/ML
INJECTION, SOLUTION INFILTRATION; PERINEURAL PRN
Status: DISCONTINUED | OUTPATIENT
Start: 2023-01-03 | End: 2023-01-03

## 2023-01-03 RX ORDER — CEFUROXIME SODIUM 1.5 G/16ML
1.5 INJECTION, POWDER, FOR SOLUTION INTRAVENOUS EVERY 4 HOURS PRN
Status: DISCONTINUED | OUTPATIENT
Start: 2023-01-03 | End: 2023-01-03 | Stop reason: HOSPADM

## 2023-01-03 RX ORDER — HEPARIN SODIUM 5000 [USP'U]/.5ML
5000 INJECTION, SOLUTION INTRAVENOUS; SUBCUTANEOUS EVERY 12 HOURS
Status: DISCONTINUED | OUTPATIENT
Start: 2023-01-03 | End: 2023-01-03

## 2023-01-03 RX ORDER — HEPARIN SODIUM 1000 [USP'U]/ML
INJECTION, SOLUTION INTRAVENOUS; SUBCUTANEOUS PRN
Status: DISCONTINUED | OUTPATIENT
Start: 2023-01-03 | End: 2023-01-03

## 2023-01-03 RX ORDER — KETOROLAC TROMETHAMINE 15 MG/ML
10 INJECTION, SOLUTION INTRAMUSCULAR; INTRAVENOUS EVERY 8 HOURS
Status: DISCONTINUED | OUTPATIENT
Start: 2023-01-03 | End: 2023-01-04 | Stop reason: HOSPADM

## 2023-01-03 RX ORDER — ONDANSETRON 2 MG/ML
4 INJECTION INTRAMUSCULAR; INTRAVENOUS EVERY 6 HOURS PRN
Status: DISCONTINUED | OUTPATIENT
Start: 2023-01-03 | End: 2023-01-03 | Stop reason: HOSPADM

## 2023-01-03 RX ORDER — FENTANYL CITRATE 50 UG/ML
10-20 INJECTION, SOLUTION INTRAMUSCULAR; INTRAVENOUS
Status: DISCONTINUED | OUTPATIENT
Start: 2023-01-03 | End: 2023-01-04

## 2023-01-03 RX ORDER — METHOCARBAMOL 500 MG/1
500 TABLET, FILM COATED ORAL 4 TIMES DAILY
Status: DISCONTINUED | OUTPATIENT
Start: 2023-01-03 | End: 2023-01-03

## 2023-01-03 RX ORDER — HEPARIN SODIUM 1000 [USP'U]/ML
5000 INJECTION, SOLUTION INTRAVENOUS; SUBCUTANEOUS ONCE
Status: DISCONTINUED | OUTPATIENT
Start: 2023-01-03 | End: 2023-01-03 | Stop reason: HOSPADM

## 2023-01-03 RX ORDER — HEPARIN SODIUM 5000 [USP'U]/.5ML
5000 INJECTION, SOLUTION INTRAVENOUS; SUBCUTANEOUS EVERY 8 HOURS
Status: DISCONTINUED | OUTPATIENT
Start: 2023-01-03 | End: 2023-01-03

## 2023-01-03 RX ORDER — DEXAMETHASONE SODIUM PHOSPHATE 4 MG/ML
8 INJECTION, SOLUTION INTRA-ARTICULAR; INTRALESIONAL; INTRAMUSCULAR; INTRAVENOUS; SOFT TISSUE ONCE
Status: DISCONTINUED | OUTPATIENT
Start: 2023-01-03 | End: 2023-01-03 | Stop reason: HOSPADM

## 2023-01-03 RX ORDER — FUROSEMIDE 10 MG/ML
INJECTION INTRAMUSCULAR; INTRAVENOUS PRN
Status: DISCONTINUED | OUTPATIENT
Start: 2023-01-03 | End: 2023-01-03

## 2023-01-03 RX ORDER — HYDROMORPHONE HCL IN WATER/PF 6 MG/30 ML
0.4 PATIENT CONTROLLED ANALGESIA SYRINGE INTRAVENOUS EVERY 5 MIN PRN
Status: DISCONTINUED | OUTPATIENT
Start: 2023-01-03 | End: 2023-01-03 | Stop reason: HOSPADM

## 2023-01-03 RX ORDER — KETOROLAC TROMETHAMINE 30 MG/ML
15 INJECTION, SOLUTION INTRAMUSCULAR; INTRAVENOUS ONCE
Status: DISCONTINUED | OUTPATIENT
Start: 2023-01-03 | End: 2023-01-03 | Stop reason: HOSPADM

## 2023-01-03 RX ORDER — AMOXICILLIN 250 MG
2 CAPSULE ORAL 2 TIMES DAILY
Status: DISCONTINUED | OUTPATIENT
Start: 2023-01-03 | End: 2023-01-04 | Stop reason: HOSPADM

## 2023-01-03 RX ORDER — BUPIVACAINE HYDROCHLORIDE 2.5 MG/ML
INJECTION, SOLUTION EPIDURAL; INFILTRATION; INTRACAUDAL
Status: COMPLETED | OUTPATIENT
Start: 2023-01-03 | End: 2023-01-03

## 2023-01-03 RX ORDER — ACETAMINOPHEN 325 MG/1
975 TABLET ORAL ONCE
Status: DISCONTINUED | OUTPATIENT
Start: 2023-01-03 | End: 2023-01-03 | Stop reason: HOSPADM

## 2023-01-03 RX ORDER — DEXAMETHASONE SODIUM PHOSPHATE 4 MG/ML
INJECTION, SOLUTION INTRA-ARTICULAR; INTRALESIONAL; INTRAMUSCULAR; INTRAVENOUS; SOFT TISSUE PRN
Status: DISCONTINUED | OUTPATIENT
Start: 2023-01-03 | End: 2023-01-03

## 2023-01-03 RX ORDER — CARDIOPLEG/ORGAN PRESERV NO.1 9-198-2-1
BOTTLE PERFUSION PRN
Status: DISCONTINUED | OUTPATIENT
Start: 2023-01-03 | End: 2023-01-03 | Stop reason: HOSPADM

## 2023-01-03 RX ORDER — FENTANYL CITRATE 50 UG/ML
25-50 INJECTION, SOLUTION INTRAMUSCULAR; INTRAVENOUS
Status: DISCONTINUED | OUTPATIENT
Start: 2023-01-03 | End: 2023-01-03 | Stop reason: HOSPADM

## 2023-01-03 RX ORDER — LIDOCAINE 40 MG/G
CREAM TOPICAL
Status: DISCONTINUED | OUTPATIENT
Start: 2023-01-03 | End: 2023-01-03 | Stop reason: HOSPADM

## 2023-01-03 RX ORDER — ONDANSETRON 4 MG/1
4 TABLET, ORALLY DISINTEGRATING ORAL EVERY 6 HOURS PRN
Status: DISCONTINUED | OUTPATIENT
Start: 2023-01-03 | End: 2023-01-03 | Stop reason: HOSPADM

## 2023-01-03 RX ORDER — PROCHLORPERAZINE MALEATE 5 MG
10 TABLET ORAL EVERY 6 HOURS PRN
Status: DISCONTINUED | OUTPATIENT
Start: 2023-01-03 | End: 2023-01-03 | Stop reason: HOSPADM

## 2023-01-03 RX ORDER — CEFUROXIME SODIUM 1.5 G/16ML
1.5 INJECTION, POWDER, FOR SOLUTION INTRAVENOUS
Status: DISCONTINUED | OUTPATIENT
Start: 2023-01-03 | End: 2023-01-03 | Stop reason: HOSPADM

## 2023-01-03 RX ORDER — BISACODYL 10 MG
10 SUPPOSITORY, RECTAL RECTAL DAILY
Status: DISCONTINUED | OUTPATIENT
Start: 2023-01-04 | End: 2023-01-04 | Stop reason: HOSPADM

## 2023-01-03 RX ADMIN — Medication 50 MG: at 07:53

## 2023-01-03 RX ADMIN — FUROSEMIDE 10 MG: 10 INJECTION, SOLUTION INTRAVENOUS at 10:20

## 2023-01-03 RX ADMIN — PHENYLEPHRINE HYDROCHLORIDE 100 MCG: 10 INJECTION INTRAVENOUS at 08:16

## 2023-01-03 RX ADMIN — SENNOSIDES AND DOCUSATE SODIUM 1 TABLET: 50; 8.6 TABLET ORAL at 19:55

## 2023-01-03 RX ADMIN — SODIUM CHLORIDE, SODIUM GLUCONATE, SODIUM ACETATE, POTASSIUM CHLORIDE AND MAGNESIUM CHLORIDE: 526; 502; 368; 37; 30 INJECTION, SOLUTION INTRAVENOUS at 09:49

## 2023-01-03 RX ADMIN — CEFUROXIME 1.5 G: 1.5 INJECTION, POWDER, FOR SOLUTION INTRAVENOUS at 08:33

## 2023-01-03 RX ADMIN — SODIUM CHLORIDE, SODIUM GLUCONATE, SODIUM ACETATE, POTASSIUM CHLORIDE AND MAGNESIUM CHLORIDE: 526; 502; 368; 37; 30 INJECTION, SOLUTION INTRAVENOUS at 08:57

## 2023-01-03 RX ADMIN — ESMOLOL HYDROCHLORIDE 50 MG: 10 INJECTION, SOLUTION INTRAVENOUS at 07:54

## 2023-01-03 RX ADMIN — FENTANYL CITRATE 50 MCG: 50 INJECTION, SOLUTION INTRAMUSCULAR; INTRAVENOUS at 11:21

## 2023-01-03 RX ADMIN — PROPOFOL 10 MG: 10 INJECTION, EMULSION INTRAVENOUS at 11:56

## 2023-01-03 RX ADMIN — PROPOFOL 10 MG: 10 INJECTION, EMULSION INTRAVENOUS at 11:48

## 2023-01-03 RX ADMIN — SODIUM CHLORIDE, POTASSIUM CHLORIDE, SODIUM LACTATE AND CALCIUM CHLORIDE: 600; 310; 30; 20 INJECTION, SOLUTION INTRAVENOUS at 07:41

## 2023-01-03 RX ADMIN — Medication 30 MG: at 09:32

## 2023-01-03 RX ADMIN — PHENYLEPHRINE HYDROCHLORIDE 200 MCG: 10 INJECTION INTRAVENOUS at 08:59

## 2023-01-03 RX ADMIN — LIDOCAINE HYDROCHLORIDE 60 MG: 20 INJECTION, SOLUTION INFILTRATION; PERINEURAL at 07:50

## 2023-01-03 RX ADMIN — ACETAMINOPHEN 975 MG: 325 TABLET, FILM COATED ORAL at 05:58

## 2023-01-03 RX ADMIN — PROPOFOL 160 MG: 10 INJECTION, EMULSION INTRAVENOUS at 07:51

## 2023-01-03 RX ADMIN — GABAPENTIN 300 MG: 300 CAPSULE ORAL at 05:58

## 2023-01-03 RX ADMIN — PROTAMINE SULFATE 30 MG: 10 INJECTION, SOLUTION INTRAVENOUS at 10:42

## 2023-01-03 RX ADMIN — HYDROMORPHONE HYDROCHLORIDE 0.2 MG: 0.2 INJECTION, SOLUTION INTRAMUSCULAR; INTRAVENOUS; SUBCUTANEOUS at 13:20

## 2023-01-03 RX ADMIN — DEXAMETHASONE SODIUM PHOSPHATE 8 MG: 4 INJECTION, SOLUTION INTRA-ARTICULAR; INTRALESIONAL; INTRAMUSCULAR; INTRAVENOUS; SOFT TISSUE at 09:19

## 2023-01-03 RX ADMIN — FENTANYL CITRATE 25 MCG: 50 INJECTION, SOLUTION INTRAMUSCULAR; INTRAVENOUS at 12:42

## 2023-01-03 RX ADMIN — BUPIVACAINE HYDROCHLORIDE 20 ML: 2.5 INJECTION, SOLUTION EPIDURAL; INFILTRATION; INTRACAUDAL; PERINEURAL at 07:00

## 2023-01-03 RX ADMIN — SUGAMMADEX 200 MG: 100 INJECTION, SOLUTION INTRAVENOUS at 11:31

## 2023-01-03 RX ADMIN — Medication 10 MG: at 10:41

## 2023-01-03 RX ADMIN — HEPARIN SODIUM 5000 UNITS: 5000 INJECTION, SOLUTION INTRAVENOUS; SUBCUTANEOUS at 19:55

## 2023-01-03 RX ADMIN — HEPARIN SODIUM 3000 UNITS: 1000 INJECTION INTRAVENOUS; SUBCUTANEOUS at 10:37

## 2023-01-03 RX ADMIN — MIDAZOLAM 2 MG: 1 INJECTION INTRAMUSCULAR; INTRAVENOUS at 07:20

## 2023-01-03 RX ADMIN — PHENYLEPHRINE HYDROCHLORIDE 200 MCG: 10 INJECTION INTRAVENOUS at 09:01

## 2023-01-03 RX ADMIN — PHENYLEPHRINE HYDROCHLORIDE 100 MCG: 10 INJECTION INTRAVENOUS at 08:47

## 2023-01-03 RX ADMIN — PROPOFOL 20 MG: 10 INJECTION, EMULSION INTRAVENOUS at 11:44

## 2023-01-03 RX ADMIN — SODIUM CHLORIDE, POTASSIUM CHLORIDE, SODIUM LACTATE AND CALCIUM CHLORIDE: 600; 310; 30; 20 INJECTION, SOLUTION INTRAVENOUS at 09:00

## 2023-01-03 RX ADMIN — ACETAMINOPHEN 650 MG: 325 TABLET, FILM COATED ORAL at 19:56

## 2023-01-03 RX ADMIN — SODIUM CHLORIDE, SODIUM GLUCONATE, SODIUM ACETATE, POTASSIUM CHLORIDE AND MAGNESIUM CHLORIDE: 526; 502; 368; 37; 30 INJECTION, SOLUTION INTRAVENOUS at 07:40

## 2023-01-03 RX ADMIN — PHENYLEPHRINE HYDROCHLORIDE 100 MCG: 10 INJECTION INTRAVENOUS at 08:42

## 2023-01-03 RX ADMIN — PHENYLEPHRINE HYDROCHLORIDE 100 MCG: 10 INJECTION INTRAVENOUS at 08:26

## 2023-01-03 RX ADMIN — SODIUM CHLORIDE, POTASSIUM CHLORIDE, SODIUM LACTATE AND CALCIUM CHLORIDE: 600; 310; 30; 20 INJECTION, SOLUTION INTRAVENOUS at 13:24

## 2023-01-03 RX ADMIN — ACETAMINOPHEN 650 MG: 325 TABLET, FILM COATED ORAL at 14:44

## 2023-01-03 RX ADMIN — SODIUM CHLORIDE, SODIUM LACTATE, POTASSIUM CHLORIDE, CALCIUM CHLORIDE AND DEXTROSE MONOHYDRATE: 5; 600; 310; 30; 20 INJECTION, SOLUTION INTRAVENOUS at 14:45

## 2023-01-03 RX ADMIN — ESMOLOL HYDROCHLORIDE 50 MG: 10 INJECTION, SOLUTION INTRAVENOUS at 07:51

## 2023-01-03 RX ADMIN — MIDAZOLAM 1 MG: 1 INJECTION INTRAMUSCULAR; INTRAVENOUS at 06:50

## 2023-01-03 RX ADMIN — KETOROLAC TROMETHAMINE 10 MG: 15 INJECTION, SOLUTION INTRAMUSCULAR; INTRAVENOUS at 17:33

## 2023-01-03 RX ADMIN — METHOCARBAMOL 500 MG: 500 TABLET ORAL at 16:10

## 2023-01-03 RX ADMIN — Medication 20 MG: at 09:01

## 2023-01-03 RX ADMIN — FENTANYL CITRATE 25 MCG: 50 INJECTION, SOLUTION INTRAMUSCULAR; INTRAVENOUS at 12:55

## 2023-01-03 RX ADMIN — METHOCARBAMOL 500 MG: 500 TABLET ORAL at 19:55

## 2023-01-03 RX ADMIN — ONDANSETRON 4 MG: 2 INJECTION INTRAMUSCULAR; INTRAVENOUS at 11:27

## 2023-01-03 RX ADMIN — PHENYLEPHRINE HYDROCHLORIDE 100 MCG: 10 INJECTION INTRAVENOUS at 07:59

## 2023-01-03 RX ADMIN — MANNITOL 12.5 G: 20 INJECTION, SOLUTION INTRAVENOUS at 10:20

## 2023-01-03 RX ADMIN — FENTANYL CITRATE 50 MCG: 50 INJECTION, SOLUTION INTRAMUSCULAR; INTRAVENOUS at 06:50

## 2023-01-03 RX ADMIN — OXYCODONE HYDROCHLORIDE 5 MG: 5 TABLET ORAL at 14:51

## 2023-01-03 RX ADMIN — PHENYLEPHRINE HYDROCHLORIDE 100 MCG: 10 INJECTION INTRAVENOUS at 08:10

## 2023-01-03 RX ADMIN — FAMOTIDINE 20 MG: 20 TABLET, FILM COATED ORAL at 14:44

## 2023-01-03 RX ADMIN — FENTANYL CITRATE 25 MCG: 50 INJECTION, SOLUTION INTRAMUSCULAR; INTRAVENOUS at 11:31

## 2023-01-03 RX ADMIN — FENTANYL CITRATE 25 MCG: 50 INJECTION, SOLUTION INTRAMUSCULAR; INTRAVENOUS at 13:07

## 2023-01-03 RX ADMIN — FENTANYL CITRATE 50 MCG: 50 INJECTION, SOLUTION INTRAMUSCULAR; INTRAVENOUS at 08:54

## 2023-01-03 RX ADMIN — FENTANYL CITRATE 50 MCG: 50 INJECTION, SOLUTION INTRAMUSCULAR; INTRAVENOUS at 08:02

## 2023-01-03 RX ADMIN — PROPOFOL 10 MG: 10 INJECTION, EMULSION INTRAVENOUS at 11:53

## 2023-01-03 RX ADMIN — SODIUM CHLORIDE, SODIUM GLUCONATE, SODIUM ACETATE, POTASSIUM CHLORIDE AND MAGNESIUM CHLORIDE: 526; 502; 368; 37; 30 INJECTION, SOLUTION INTRAVENOUS at 11:00

## 2023-01-03 RX ADMIN — PROPOFOL 40 MG: 10 INJECTION, EMULSION INTRAVENOUS at 07:53

## 2023-01-03 RX ADMIN — FENTANYL CITRATE 25 MCG: 50 INJECTION, SOLUTION INTRAMUSCULAR; INTRAVENOUS at 11:51

## 2023-01-03 RX ADMIN — BUPIVACAINE 20 ML: 13.3 INJECTION, SUSPENSION, LIPOSOMAL INFILTRATION at 07:00

## 2023-01-03 RX ADMIN — KETOROLAC TROMETHAMINE 10 MG: 30 INJECTION, SOLUTION INTRAMUSCULAR at 10:53

## 2023-01-03 RX ADMIN — MAGNESIUM SULFATE HEPTAHYDRATE 2 G: 40 INJECTION, SOLUTION INTRAVENOUS at 08:09

## 2023-01-03 ASSESSMENT — ACTIVITIES OF DAILY LIVING (ADL)
ADLS_ACUITY_SCORE: 18

## 2023-01-03 NOTE — OR NURSING
Handoff report given to 7A nurse Becky Oneill RN.  Becky notified by writing RN IV fluid D5 LR not available in PACU prior to patient transport.   ml infusing.  Becky will check 7A pyxis and change IV fluids appropriately when available.

## 2023-01-03 NOTE — PROGRESS NOTES
"SUBJECTIVE:  POD 0 laparoscopic assisted left nephrectomy for donation.    Pain control adequate. Denies nausea or SOB. Discussed plan to ambulate this evening and be OOB to chair.     EXAM:   /80   Pulse 80   Temp 98.2  F (36.8  C) (Oral)   Resp 14   Ht 1.702 m (5' 7\")   Wt 79.4 kg (175 lb 0.7 oz)   SpO2 94%   BMI 27.42 kg/m    Constitutional: NAD  Cardiovascular: well perfused  Respiratory: NLB on RA  Abdomen: no distention, appropriately tender, soft  Incision(s): covered with surgical dressing  : rosenberg  Extremities: no cyanosis or edema   Neurologic: axox3    Assessment: Post-operative day #0, doing well.    PLAN:   -Pain control: Scheduled acetaminophen. Encourage oral analgesia prn.   -Remove rosenberg tomorrow AM  -Discontinue IV fluid if intake > 500  -Diet as tolerated  -Zofran and compazine PRN  -Encouraged ambulation and IS this evening      Judie Upton, PAC 9898  Transplant Surgery          "

## 2023-01-03 NOTE — ANESTHESIA CARE TRANSFER NOTE
Patient: Lizzeth Alarcon    Procedure: Procedure(s):  Laparoscopic Hand Assisted Living Non-Directed Left Kidney Donor       Diagnosis: Examination of potential donor of organ and tissue [Z00.5]  Encounter for donation of kidney [Z52.4]  Diagnosis Additional Information: No value filed.    Anesthesia Type:   General     Note:    Oropharynx: oropharynx clear of all foreign objects and spontaneously breathing  Level of Consciousness: awake  Oxygen Supplementation: face mask  Level of Supplemental Oxygen (L/min / FiO2): 6  Independent Airway: airway patency satisfactory and stable  Dentition: dentition unchanged  Vital Signs Stable: post-procedure vital signs reviewed and stable  Report to RN Given: handoff report given  Patient transferred to: PACU    Handoff Report: Identifed the Patient, Identified the Reponsible Provider, Reviewed the pertinent medical history, Discussed the surgical course, Reviewed Intra-OP anesthesia mangement and issues during anesthesia, Set expectations for post-procedure period and Allowed opportunity for questions and acknowledgement of understanding      Vitals:  Vitals Value Taken Time   BP 83/53 01/03/23 1211   Temp     Pulse 93 01/03/23 1215   Resp 21 01/03/23 1215   SpO2 100 % 01/03/23 1215   Vitals shown include unvalidated device data.    Electronically Signed By: Tk Polo MD  January 3, 2023  12:16 PM

## 2023-01-03 NOTE — PHARMACY-TRANSPLANT NOTE
D/I: 32 year old female s/p Laparoscopic Hand Assisted Living Non-Directed Left Kidney Donor surgery 1/3/23.  Medications have been reviewed by the pharmacist for efficacy, appropriate dose, medication interactions and potential adverse effects.      A: Medications reviewed for this patient as above. The following issues were noted and communicated to the primary team:   + Enskyce (desogestrel and ethinyl estradiol): places patient at greater risk of post-surgical clot formation  + Spironolactone: places patient at greater risk of post-nephrectomy hyperkalemia  P: Pharmacy will continue to monitor for any potential medication issues, and will make recommendations as appropriate. Medication therapy needs for discharge planning will continue to be addressed throughout the current admission via multidisciplinary rounds and order review.    Jerome Santo PharmD, BCTXP, BCPS  Inpatient clinical pharmacist

## 2023-01-03 NOTE — OP NOTE
Transplant Surgery  Operative Note     Procedure Date:  01/03/23    Preoperative Diagnosis:  Healthy kidney donor    Postoperative Diagnosis:  Healthy kidney donor    Procedure:  1. Left Kidney  living donor nephrectomy for donation       Secondary Procedure:    none     Surgeon:    Surgeon(s) and Role:     * Arron Moran MD - Primary     * Blas Esteban MD - Resident - Assisting     * Checo Nash MD - Fellow - Assisting    Fellow/Assistant:    Checo Nash MD  There was no qualified assistant to assist with this procedure.    Specimen:  Left kidney and ureter    Anesthesia:    General    Urine Output: 700 mls  Estimated Blood Loss: 50 mls   Fluids administered: 3000ml       Findings: single artery, single vein, single ureter       Donor UNOS ID:    [id number]  Flush Start time:  1/3/2023 10:46 AM    Arterial Clamp:    1/3/2023 10:42 AM  Arterial anatomy:  1    Venous anatomy:    1  Ureteral anatomy:   1     Indication: Lizzeth Alarcon presents for Left Kidney donation. The patient has undergone a thorough medical and psychosocial evaluation and was found suitable for voluntary kidney donation. Risks and benefits of donation were discussed. The patient expressed understanding, was willing to proceed, and provided informed consent.    Final ABO/Crossmatch verification: Prior to incision, I verified the donor ABO and recipient ABO. I visually verified that the donor identification, blood type, and other vital data were compatible with the recipient.      Operative Procedure:  Lizzeth Alarcon was transported to the operating room, placed on the operating table in the right lateral decubitus position, and a universal timeout was performed. Sequential compression devices were placed on both lower extremities and general endotracheal anesthesia was induced. The patient was given IV antibiotics and Adorno catheter.  The abdomen was shaved, prepped, and draped in the usual sterile fashion.    We made a 6 cm  upper midline incision and carried down thru the linea alba. The peritoneum was opened under direct vision. The hand port was put into position and a left lower quadrant 10 mm port was placed over a trocar with hand assistance. Pneumoperitoneum with CO2 was provided to 12 mmHg. General survey with the laparoscope revealed no unusual findings. An additional 10 mm port was placed in the left lateral abdomen under direct vision.     We released the left colon from its lateral attachments and rotated medially, revealing the kidney and ureter. The ureter was circumferentially dissected free distally toward the pelvis then kidney taking care to preserve its vasculature. The gonadal vein was identified and dissected enbloc with the ureter, and the proximal gonadal vein was doubly ligated and divided near the insertion into the left renal vein. The left adrenal vein was likewise identified, ligated and divided. The renal vein was then circumferentially cleared of extraneous tissue. The kidney and ureter were dissected free posteriorly from the psoas and multiple lumbar veins were clipped and divided.     We created a plane between the left adrenal gland and the upper pole of the kidney with the harmonic scalpel and the upper pole attachments were released. The anterior and inferior aspects of the renal artery at its origin were cleared of investing lymphatics. The patient was given fluid, mannitol and lasix, and the kidney was then dissected free from its lateral attachments allowing full medial rotation. The remaining lymphatics and fat around the renal artery was cleared. The patient was heparinized and the distal ureter and gonadal vein were clipped and divided. Good urine flow was seen. A laparoscopic JONATHAN stapler was fired across the renal artery and then the renal vein.     The kidney was delivered from the hand port, flushed, and placed in cold storage. Protamine was administered for full heparin reversal.  Pneumoperitoneum was reestablished and hemostasis was obtained. Vascular transection sites were visualized and confirmed secure. The colon was placed back in its natural position. The 10 mm port sites were closed with 0-vicryl. The hand port was closed with 0-PDS. Skin incisions were irrigated and closed with 4-0 monocryl and Dermabond. Needle, sponge, and instrument counts were correct x2.  Faculty was present for key portions of the procedure. The patient tolerated the procedure well without apparent complications and was extubated and transferred to PACU in good condition.     ATTESTATION:    I was present during the key portions of the procedure, and I was immediately available for the entire procedure between opening and closing.    I asked Dr. Nash to assist with the surgery as no suitable resident was available.  Dr. Nash performed some of the vascular dissection while I ran the laparoscope.    Arron Moran MD, PhD  Associate Professor of Surgery

## 2023-01-03 NOTE — ANESTHESIA PROCEDURE NOTES
Erector spinae Procedure Note    Pre-Procedure   Staff -        Anesthesiologist:  Andrez Mclaughlin MD       Resident/Fellow: Esau Herrera MD       Performed By: resident       Location: pre-op       Procedure Start/Stop Times: 1/3/2023 7:00 AM and 1/3/2023 7:15 AM       Pre-Anesthestic Checklist: patient identified, IV checked, site marked, risks and benefits discussed, informed consent, monitors and equipment checked, pre-op evaluation, at physician/surgeon's request and post-op pain management  Timeout:       Correct Patient: Yes        Correct Procedure: Yes        Correct Site: Yes        Correct Position: Yes        Correct Laterality: Yes        Site Marked: Yes  Procedure Documentation  Procedure: Erector spinae       Diagnosis: POST-SURGICAL ANALGESIA       Laterality: bilateral       Patient Position: prone       Patient Prep/Sterile Barriers: sterile gloves, mask, patient draped       Skin prep: Chloraprep       Local skin infiltrated with mL of 1% lidocaine.        Insertion Site: T6-7.       Needle Type: short bevel       Needle Gauge: 21.        Needle Length (millimeters): 110        Ultrasound guided       1. Ultrasound was used to identify targeted nerve, plexus, vascular marker, or fascial plane and place a needle adjacent to it in real-time.       2. Ultrasound was used to visualize the spread of anesthetic in close proximity to the above referenced structure.       3. A permanent image is entered into the patient's record.       4. The visualized anatomic structures appeared normal.       5. There were no apparent abnormal pathologic findings.    Assessment/Narrative         The placement was negative for: blood aspirated, painful injection and site bleeding       Paresthesias: No.       Bolus given via needle..        Secured via.        Insertion/Infusion Method: Single Shot       Complications: none    Medication(s) Administered   Bupivacaine 0.25% PF (Infiltration) -  "Infiltration   20 mL - 1/3/2023 7:00:00 AM  Bupivacaine liposome (Exparel) 1.3% LA inj susp (Infiltration) - Infiltration   20 mL - 1/3/2023 7:00:00 AM  Medication Administration Time: 1/3/2023 7:00 AM      FOR [facility] ONLY:   Pain Team Contact information: please page the Pain Team Via [facility]. Search \"Pain\". During daytime hours, please page the attending first. At night please page the resident first.    "

## 2023-01-03 NOTE — ANESTHESIA PROCEDURE NOTES
Airway       Patient location during procedure: OR       Procedure Start/Stop Times: 1/3/2023 7:55 AM  Staff -        Resident/Fellow: Tk Polo MD       Performed By: resident  Consent for Airway        Urgency: elective  Indications and Patient Condition       Indications for airway management: gerson-procedural       Induction type:intravenous       Mask difficulty assessment: 1 - vent by mask    Final Airway Details       Final airway type: endotracheal airway       Successful airway: ETT - single  Endotracheal Airway Details        ETT size (mm): 7.0       Cuffed: yes       Successful intubation technique: direct laryngoscopy       DL Blade Type: MAC 4       Grade View of Cords: 1       Adjucts: stylet       Position: Center       Measured from: gums/teeth       Secured at (cm): 23       Bite block used: None    Post intubation assessment        Placement verified by: capnometry, equal breath sounds and chest rise        Number of attempts at approach: 1       Number of other approaches attempted: 0       Secured with: pink tape       Ease of procedure: easy       Dentition: Intact    Medication(s) Administered   Medication Administration Time: 1/3/2023 7:55 AM

## 2023-01-03 NOTE — OR NURSING
Dr. Nash at bedside in PACU and has spoken with patient.  Dr. Nash notified of CBC and CK results.  No new orders obtained.

## 2023-01-03 NOTE — ANESTHESIA POSTPROCEDURE EVALUATION
Patient: Lizzeth Alarcon    Procedure: Procedure(s):  Laparoscopic Hand Assisted Living Non-Directed Left Kidney Donor       Anesthesia Type:  General    Note:  Disposition: Inpatient   Postop Pain Control: Uneventful            Sign Out: Well controlled pain   PONV: No   Neuro/Psych: Uneventful            Sign Out: Acceptable/Baseline neuro status   Airway/Respiratory: Uneventful            Sign Out: Acceptable/Baseline resp. status   CV/Hemodynamics: Uneventful            Sign Out: Acceptable CV status; No obvious hypovolemia; No obvious fluid overload   Other NRE: NONE   DID A NON-ROUTINE EVENT OCCUR? No           Last vitals:  Vitals Value Taken Time   /67 01/03/23 1345   Temp 36.8  C (98.2  F) 01/03/23 1330   Pulse 77 01/03/23 1347   Resp 11 01/03/23 1347   SpO2 99 % 01/03/23 1345   Vitals shown include unvalidated device data.    Electronically Signed By: Rahat Rosenberg MD  January 3, 2023  2:23 PM

## 2023-01-03 NOTE — ANESTHESIA POSTPROCEDURE EVALUATION
Patient: Lizzeth Alarcon    Procedure: Procedure(s):  Laparoscopic Hand Assisted Living Non-Directed Left Kidney Donor       Anesthesia Type:  General    Note:  Disposition: Inpatient   Postop Pain Control: Uneventful            Sign Out: Well controlled pain   PONV: No   Neuro/Psych: Uneventful            Sign Out: Acceptable/Baseline neuro status   Airway/Respiratory: Uneventful            Sign Out: Acceptable/Baseline resp. status   CV/Hemodynamics: Uneventful            Sign Out: Acceptable CV status; No obvious hypovolemia; No obvious fluid overload   Other NRE: NONE   DID A NON-ROUTINE EVENT OCCUR? No           Last vitals:  Vitals Value Taken Time   BP 96/64 01/03/23 1315   Temp 37.1  C (98.8  F) 01/03/23 1245   Pulse 87 01/03/23 1317   Resp 19 01/03/23 1317   SpO2 100 % 01/03/23 1317   Vitals shown include unvalidated device data.    Electronically Signed By: Esau Herrera MD  January 3, 2023  1:18 PM

## 2023-01-03 NOTE — OR NURSING
BRENDAN block timeout done @ 0650 - and block finished at 0710 patient tolerated well. VSS throughout 50 of fent and 1 of versed given

## 2023-01-03 NOTE — OR NURSING
Dr. Esau Herrera at bedside and has spoken with patient. Dr. Herrera will enter anesthesia sign out.

## 2023-01-04 VITALS
TEMPERATURE: 98.1 F | HEIGHT: 67 IN | DIASTOLIC BLOOD PRESSURE: 91 MMHG | OXYGEN SATURATION: 98 % | SYSTOLIC BLOOD PRESSURE: 123 MMHG | RESPIRATION RATE: 16 BRPM | HEART RATE: 79 BPM | WEIGHT: 180.7 LBS | BODY MASS INDEX: 28.36 KG/M2

## 2023-01-04 PROBLEM — G89.18 ACUTE POST-OPERATIVE PAIN: Status: ACTIVE | Noted: 2023-01-04

## 2023-01-04 LAB
ALBUMIN UR-MCNC: NEGATIVE MG/DL
APPEARANCE UR: CLEAR
BACTERIA #/AREA URNS HPF: NORMAL /HPF
BILIRUB UR QL STRIP: NEGATIVE
BUN SERPL-MCNC: 11.2 MG/DL (ref 6–20)
CK SERPL-CCNC: 707 U/L (ref 26–192)
COLOR UR AUTO: NORMAL
CREAT SERPL-MCNC: 1.56 MG/DL (ref 0.51–0.95)
GFR SERPL CREATININE-BSD FRML MDRD: 45 ML/MIN/1.73M2
GLUCOSE UR STRIP-MCNC: NEGATIVE MG/DL
HCT VFR BLD AUTO: 38.7 % (ref 35–47)
HGB BLD-MCNC: 12.6 G/DL (ref 11.7–15.7)
HGB UR QL STRIP: NEGATIVE
KETONES UR STRIP-MCNC: NEGATIVE MG/DL
LEUKOCYTE ESTERASE UR QL STRIP: NEGATIVE
NITRATE UR QL: NEGATIVE
PH UR STRIP: 6 [PH] (ref 5–7)
RBC URINE: <1 /HPF
SP GR UR STRIP: 1.01 (ref 1–1.03)
UROBILINOGEN UR STRIP-MCNC: NORMAL MG/DL
WBC URINE: <1 /HPF

## 2023-01-04 PROCEDURE — 250N000011 HC RX IP 250 OP 636: Performed by: STUDENT IN AN ORGANIZED HEALTH CARE EDUCATION/TRAINING PROGRAM

## 2023-01-04 PROCEDURE — 82565 ASSAY OF CREATININE: CPT | Performed by: STUDENT IN AN ORGANIZED HEALTH CARE EDUCATION/TRAINING PROGRAM

## 2023-01-04 PROCEDURE — 250N000013 HC RX MED GY IP 250 OP 250 PS 637: Performed by: PHYSICIAN ASSISTANT

## 2023-01-04 PROCEDURE — 81001 URINALYSIS AUTO W/SCOPE: CPT | Performed by: STUDENT IN AN ORGANIZED HEALTH CARE EDUCATION/TRAINING PROGRAM

## 2023-01-04 PROCEDURE — 250N000011 HC RX IP 250 OP 636: Performed by: PHYSICIAN ASSISTANT

## 2023-01-04 PROCEDURE — 85014 HEMATOCRIT: CPT | Performed by: STUDENT IN AN ORGANIZED HEALTH CARE EDUCATION/TRAINING PROGRAM

## 2023-01-04 PROCEDURE — 250N000013 HC RX MED GY IP 250 OP 250 PS 637: Performed by: STUDENT IN AN ORGANIZED HEALTH CARE EDUCATION/TRAINING PROGRAM

## 2023-01-04 PROCEDURE — 84520 ASSAY OF UREA NITROGEN: CPT | Performed by: STUDENT IN AN ORGANIZED HEALTH CARE EDUCATION/TRAINING PROGRAM

## 2023-01-04 PROCEDURE — 36415 COLL VENOUS BLD VENIPUNCTURE: CPT | Performed by: STUDENT IN AN ORGANIZED HEALTH CARE EDUCATION/TRAINING PROGRAM

## 2023-01-04 PROCEDURE — 82550 ASSAY OF CK (CPK): CPT | Performed by: STUDENT IN AN ORGANIZED HEALTH CARE EDUCATION/TRAINING PROGRAM

## 2023-01-04 RX ORDER — BISACODYL 10 MG
10 SUPPOSITORY, RECTAL RECTAL DAILY PRN
Qty: 3 SUPPOSITORY | Refills: 0 | Status: SHIPPED | OUTPATIENT
Start: 2023-01-04 | End: 2023-05-26

## 2023-01-04 RX ORDER — DESOGESTREL AND ETHINYL ESTRADIOL 0.15-0.03
1 KIT ORAL DAILY
Status: DISCONTINUED | OUTPATIENT
Start: 2023-01-04 | End: 2023-01-04 | Stop reason: HOSPADM

## 2023-01-04 RX ORDER — SENNOSIDES 8.6 MG
1-2 TABLET ORAL 2 TIMES DAILY
Qty: 60 TABLET | Refills: 0 | Status: SHIPPED | OUTPATIENT
Start: 2023-01-04 | End: 2023-05-26

## 2023-01-04 RX ORDER — ACETAMINOPHEN 325 MG/1
650 TABLET ORAL EVERY 4 HOURS PRN
Qty: 100 TABLET | Refills: 0 | Status: SHIPPED | OUTPATIENT
Start: 2023-01-04 | End: 2023-05-26

## 2023-01-04 RX ORDER — POLYETHYLENE GLYCOL 3350 17 G/17G
1 POWDER, FOR SOLUTION ORAL 2 TIMES DAILY PRN
Qty: 527 G | Refills: 0 | Status: SHIPPED | OUTPATIENT
Start: 2023-01-04 | End: 2023-05-26

## 2023-01-04 RX ORDER — OXYCODONE HYDROCHLORIDE 5 MG/1
5-10 TABLET ORAL EVERY 4 HOURS PRN
Qty: 20 TABLET | Refills: 0 | Status: SHIPPED | OUTPATIENT
Start: 2023-01-04 | End: 2023-05-26

## 2023-01-04 RX ORDER — METHOCARBAMOL 500 MG/1
500 TABLET, FILM COATED ORAL 4 TIMES DAILY PRN
Qty: 20 TABLET | Refills: 0 | Status: SHIPPED | OUTPATIENT
Start: 2023-01-04 | End: 2023-05-26

## 2023-01-04 RX ORDER — OXYCODONE HYDROCHLORIDE 5 MG/1
5-10 TABLET ORAL EVERY 4 HOURS
Qty: 20 TABLET | Refills: 0 | Status: SHIPPED | OUTPATIENT
Start: 2023-01-04 | End: 2023-01-04

## 2023-01-04 RX ORDER — ONDANSETRON 4 MG/1
4 TABLET, ORALLY DISINTEGRATING ORAL EVERY 6 HOURS PRN
Qty: 5 TABLET | Refills: 0 | Status: SHIPPED | OUTPATIENT
Start: 2023-01-04 | End: 2023-05-26

## 2023-01-04 RX ORDER — SPIRONOLACTONE 100 MG/1
TABLET, FILM COATED ORAL
Start: 2023-01-04 | End: 2023-05-02

## 2023-01-04 RX ADMIN — FAMOTIDINE 20 MG: 20 TABLET, FILM COATED ORAL at 07:35

## 2023-01-04 RX ADMIN — SENNOSIDES AND DOCUSATE SODIUM 2 TABLET: 50; 8.6 TABLET ORAL at 07:35

## 2023-01-04 RX ADMIN — ACETAMINOPHEN 650 MG: 325 TABLET, FILM COATED ORAL at 02:13

## 2023-01-04 RX ADMIN — KETOROLAC TROMETHAMINE 10 MG: 15 INJECTION, SOLUTION INTRAMUSCULAR; INTRAVENOUS at 02:14

## 2023-01-04 RX ADMIN — KETOROLAC TROMETHAMINE 10 MG: 15 INJECTION, SOLUTION INTRAMUSCULAR; INTRAVENOUS at 10:38

## 2023-01-04 RX ADMIN — ACETAMINOPHEN 650 MG: 325 TABLET, FILM COATED ORAL at 07:35

## 2023-01-04 RX ADMIN — HEPARIN SODIUM 5000 UNITS: 5000 INJECTION, SOLUTION INTRAVENOUS; SUBCUTANEOUS at 07:34

## 2023-01-04 RX ADMIN — METHOCARBAMOL 500 MG: 500 TABLET ORAL at 11:49

## 2023-01-04 RX ADMIN — METHOCARBAMOL 500 MG: 500 TABLET ORAL at 07:35

## 2023-01-04 RX ADMIN — BISACODYL 10 MG: 10 SUPPOSITORY RECTAL at 06:09

## 2023-01-04 ASSESSMENT — ACTIVITIES OF DAILY LIVING (ADL)
ADLS_ACUITY_SCORE: 18

## 2023-01-04 NOTE — PROGRESS NOTES
Transplant Surgery  Inpatient Daily Progress Note  01/04/2023    Ms. Alarcon is Post-operative day #1 s/p laparoscopic left donor nephrectomy. Issues Overnight: Pain controlled, no flatus, no nausea     Patient Vitals for the past 24 hrs:   BP Temp Temp src Pulse Resp SpO2 Weight   01/04/23 1007 112/77 98.2  F (36.8  C) Oral 82 16 99 % 82 kg (180 lb 11.2 oz)   01/04/23 0544 120/74 98.2  F (36.8  C) Oral 107 17 97 % --   01/04/23 0158 103/59 98.6  F (37  C) Oral 109 17 95 % --   01/03/23 2207 121/69 98.2  F (36.8  C) Oral 112 17 94 % --   01/03/23 1803 130/84 96.8  F (36  C) Oral 89 24 -- --   01/03/23 1426 119/80 -- -- 80 14 94 % --   01/03/23 1345 107/67 -- -- 87 16 99 % --   01/03/23 1339 -- -- -- -- -- 99 % --   01/03/23 1330 99/72 98.2  F (36.8  C) Oral 90 14 100 % --   01/03/23 1300 110/75 -- -- 84 14 100 % --   01/03/23 1245 97/65 98.8  F (37.1  C) Temporal 96 14 100 % --   01/03/23 1230 107/69 -- -- 85 14 100 % --   01/03/23 1215 98/57 -- -- 93 14 100 % --   01/03/23 1211 (!) 83/53 98.4  F (36.9  C) Temporal 100 14 100 % --       Pain: Visual Analog Score: 2  Nausea:    [x]    None      []    Some, but not needing medication    []    Yes, needing medication      []    Dry Retching    []    Vomited    DREAMS Performance:    DRinking: y   Eating: y   Analgesia: good   Mobilizing:  y   Slept: y    Passed flatus? No  Incision(s): C/D/I with no gerson-incisional ecchymoses  Abdomen: no distention, appropriately tender, soft  Extremities: no cyanosis or edema     Allergies:   Allergies   Allergen Reactions     Minocycline      Joint pains       Medications:  Prescription Medications as of 1/4/2023       Rx Number Disp Refills Start End Last Dispensed Date Next Fill Date Owning Pharmacy    ENSKYCE 0.15-30 MG-MCG tablet   0 5/19/2019    [facility] [address] [facility] [place] [address] AT [facility]    Sig: Take 1 tablet by mouth daily    Class: Historical    Route: Oral    spironolactone  (ALDACTONE) 100 MG tablet  180 tablet 2 8/4/2022    [facility] - [address]    Sig: TAKE 2 TABLETS DAILY    Class: E-Prescribe    SUMAtriptan (IMITREX) 25 MG tablet  30 tablet 0 8/3/2022    [facility] - [address]    Sig: Take 1 tablet (25 mg) by mouth at onset of headache for migraine May repeat in 2 hours. Max 4 tablets/24 hours.    Class: E-Prescribe    Route: Oral      Hospital Medications as of 1/4/2023       Dose Frequency Start End    acetaminophen (TYLENOL) tablet 650 mg 650 mg EVERY 6 HOURS 1/3/2023 1/5/2023    Admin Instructions: Administer for multimodal surgical pain management.  Pharmacy to time the next dose 8 hours from PRE OP dose.  Maximum acetaminophen dose from all sources = 75 mg/kg/day not to exceed 4 grams/day.    Class: E-Prescribe    Route: Oral    bisacodyl (DULCOLAX) suppository 10 mg 10 mg DAILY 1/4/2023     Admin Instructions: Start POD 1, then daily until BM. Hold for loose stools.  Hold for loose stools.    Class: E-Prescribe    Route: Rectal    dextrose 5% in lactated ringers infusion  CONTINUOUS 1/3/2023     Admin Instructions: Heplock when oral intake greater than 500 mL.    Class: E-Prescribe    Route: Intravenous    famotidine (PEPCID) tablet 20 mg 20 mg DAILY 1/3/2023     Class: E-Prescribe    Route: Oral    heparin ANTICOAGULANT injection 5,000 Units 5,000 Units 3 TIMES DAILY 1/3/2023     Admin Instructions: High concentration heparin. Not for line flush or cath care.    Class: E-Prescribe    Route: Subcutaneous    ketorolac (TORADOL) injection 10 mg 10 mg EVERY 8 HOURS 1/3/2023 1/5/2023    Admin Instructions: Pharmacy to time the first dose based on the timing of the INTRA-OP dose.  Can cause pain on injection.  If ordered intravenously (IV) : administer through a running maintenance fluid over 1 minute followed by a flush.  If patient complains of pain on injection, may dilute 15-30 mg  in 5 mL and push over 1 to 2 minutes.      Class: E-Prescribe    Route: Intravenous    methocarbamol (ROBAXIN) tablet 500 mg 500 mg 4 TIMES DAILY 1/3/2023     Class: E-Prescribe    Route: Oral    naloxone (NARCAN) injection 0.2 mg 0.2 mg EVERY 2 MIN PRN 1/3/2023     Admin Instructions: Administer intravenous route when available and notify provider when administered.  For unintended sedation or respiratory depression if all of the below criteria are met:  ~ respiratory rate LESS than or EQUAL to 8.   ~SaO2 less than 92% and or/end-tidal CO2 is greater than 50.  ~ the patient is receiving an opioid, has unintended sedations assessed as RASS (-3), and is currently not on mechanical ventilation.  RASS scale moderate (-3) is movement or eye opening to voice but no eye contact.    Patient Monitoring  Once the patient has demonstrated a response to the naloxone, continue to monitor respiratory rate, depth, oxygen saturation and end-tidal CO2 (if available) every 15 minutes x 2, then every 30 minutes x 2, then every 1 hour x 1 after each naloxone dose.  Consider transfer to ICU if patient respiratory parameters have not improved after 4 naloxone doses.    Class: E-Prescribe    Route: Intravenous    Linked Group 1: See Arti for full Linked Orders Report.        naloxone (NARCAN) injection 0.2 mg 0.2 mg EVERY 2 MIN PRN 1/3/2023     Admin Instructions: Administer intramuscular if an intravenous route is not available and notify provider when administered.  For unintended sedation or respiratory depression if all of the below criteria are met:  ~ respiratory rate LESS than or EQUAL to 8.   ~SaO2 less than 92% and or/end-tidal CO2 is greater than 50.  ~ the patient is receiving an opioid, has unintended sedations assessed as RASS (-3), and is currently not on mechanical ventilation.  RASS scale moderate (-3) is movement or eye opening to voice but no eye contact.    Patient Monitoring  Once the patient has demonstrated a  response to the naloxone, continue to monitor respiratory rate, depth, oxygen saturation and end-tidal CO2 (if available) every 15 minutes x 2, then every 30 minutes x 2, then every 1 hour x 1 after each naloxone dose.  Consider transfer to ICU if patient respiratory parameters have not improved after 4 naloxone doses.    Class: E-Prescribe    Route: Intramuscular    Linked Group 1: See Hyperspace for full Linked Orders Report.        naloxone (NARCAN) injection 0.4 mg 0.4 mg EVERY 2 MIN PRN 1/3/2023     Admin Instructions: Administer intravenous route when available and notify provider when administered.  For unintended sedation or respiratory depression if all of the below criteria are met:  ~ respiratory rate LESS than or EQUAL to 8.  ~ SaO2 less than 92% and or/end-tidal CO2 is greater than 50.  ~ the patient is receiving an opioid, has unintended sedation assessed as RASS (-4) or (-5) and patient is currently not on mechanical ventilation.  RASS scale (-4) is deep sedation with no response to voice but movement or eye opening to physical stimulation.  RASS scale (-5) is unarousable.      Patient Monitoring  Once the patient has demonstrated a response to the naloxone, continue to monitor respiratory rate, depth, oxygen saturation and end-tidal CO2 (if available) every 15 minutes x 2, then every 30 minutes x 2, then every 1 hour x 1 after each naloxone dose.  Consider transfer to ICU if patient respiratory parameters have not improved after 4 naloxone doses.    Class: E-Prescribe    Route: Intravenous    Linked Group 1: See Hyperspace for full Linked Orders Report.        naloxone (NARCAN) injection 0.4 mg 0.4 mg EVERY 2 MIN PRN 1/3/2023     Admin Instructions: Administer intramuscular if an intravenous route is not available and notify provider when administered.  For unintended sedation or respiratory depression if all of the below criteria are met:  ~ respiratory rate LESS than or EQUAL to 8.  ~ SaO2 less  than 92% and or/end-tidal CO2 is greater than 50.  ~ the patient is receiving an opioid, has unintended sedation assessed as RASS (-4) or (-5) and patient is currently not on mechanical ventilation.  RASS scale (-4) is deep sedation with no response to voice but movement or eye opening to physical stimulation.  RASS scale (-5) is unarousable.      Patient Monitoring  Once the patient has demonstrated a response to the naloxone, continue to monitor respiratory rate, depth, oxygen saturation and end-tidal CO2 (if available) every 15 minutes x 2, then every 30 minutes x 2, then every 1 hour x 1 after each naloxone dose.  Consider transfer to ICU if patient respiratory parameters have not improved after 4 naloxone doses.    Class: E-Prescribe    Route: Intramuscular    Linked Group 1: See Hyperspace for full Linked Orders Report.        ondansetron (ZOFRAN ODT) ODT tab 4 mg 4 mg EVERY 6 HOURS PRN 1/3/2023     Admin Instructions: This is Step 1 of nausea and vomiting management.  If nausea not resolved in 15 minutes, go to Step 2 prochlorperazine (COMPAZINE). Do not push through foil backing. Peel back foil and gently remove. Place on tongue immediately. Administration with liquid unnecessary  With dry hands, peel back foil backing and gently remove tablet. Do not push oral disintegrating tablet through foil backing. Administer immediately on tongue and oral disintegrating tablet dissolves in seconds, then swallow with saliva. Liquid not required.    Class: E-Prescribe    Route: Oral    Linked Group 2: See Hyperspace for full Linked Orders Report.        ondansetron (ZOFRAN) injection 4 mg 4 mg EVERY 6 HOURS PRN 1/3/2023     Admin Instructions: This is Step 1 of nausea and vomiting management.  If nausea not resolved in 15 minutes, go to Step 2 prochlorperazine (COMPAZINE).  Irritant.    Class: E-Prescribe    Route: Intravenous    Linked Group 2: See Hyperspace for full Linked Orders Report.        oxyCODONE (ROXICODONE)  tablet 5-10 mg 5-10 mg EVERY 3 HOURS PRN 1/3/2023     Admin Instructions: ~ Start at the lowest dose.  ~ May adjust dose by 5 mg every 3 hours to optimize patient mobilization and comfort as needed.  Maximum individual dose is 10 mg.  ~ Hold dose for analgesic side effects.    ~ Notify provider to assess patient for uncontrolled pain or analgesic side effects.   ~ Maximum total is 80 mg in 24 hours.    Route: Oral    prochlorperazine (COMPAZINE) injection 10 mg 10 mg EVERY 6 HOURS PRN 1/3/2023     Admin Instructions: This is Step 2 of nausea and vomiting management.   If nausea not resolved in 15-30 minutes, Notify provider.    Class: E-Prescribe    Route: Intravenous    Linked Group 3: See Hyperspace for full Linked Orders Report.        prochlorperazine (COMPAZINE) tablet 10 mg 10 mg EVERY 6 HOURS PRN 1/3/2023     Admin Instructions: This is Step 2 of nausea and vomiting management.   If nausea not resolved in 15-30 minutes, Notify provider.    Class: E-Prescribe    Route: Oral    Linked Group 3: See Hyperspace for full Linked Orders Report.        protamine injection 50 mg (Completed) 50 mg ONCE 1/3/2023 1/3/2023    Admin Instructions: At direction of Surgeon, administer protamine intra-operatively.  Give slowly once renal artery is divided.   1 ml protamine (10 mg/mL) for every 1 ml heparin (1000 units/mL)    Class: E-Prescribe    Route: Intravenous    senna-docusate (SENOKOT-S/PERICOLACE) 8.6-50 MG per tablet 1 tablet 1 tablet 2 TIMES DAILY 1/3/2023     Admin Instructions: If no bowel movement in 24 hours, increase to 2 tablets PO.  Hold for loose stools.  Hold for loose stools.    Class: E-Prescribe    Route: Oral    Linked Group 4: See Hyperspace for full Linked Orders Report.        senna-docusate (SENOKOT-S/PERICOLACE) 8.6-50 MG per tablet 2 tablet 2 tablet 2 TIMES DAILY 1/3/2023     Admin Instructions: Hold for loose stools.  Hold for loose stools.    Class: E-Prescribe    Route: Oral    Linked Group 4:  See Hyperspace for full Linked Orders Report.              Labs:  Lab Results   Component Value Date    CR 1.56 (H) 01/04/2023    CR 0.75 12/19/2022    CR 0.94 10/28/2022     Lab Results   Component Value Date    HGB 12.6 01/04/2023    HGB 12.0 01/03/2023    HGB 12.1 12/19/2022       Assessment: Post-operative day #1, doing well.    Plan:   -Encouraged ambulation and ISB   -Continue GI and DVT prophylaxis  -Pain control: Scheduled acetaminophen. Encourage oral analgesia prn.  -Remove rosenberg  -Discontinue IV fluid    Discharge planning: Today vs tomorrow    ALYSSA Alvarado CNP  Division of Transplantation  Pager [phone]

## 2023-01-04 NOTE — PLAN OF CARE
"/59 (BP Location: Left arm)   Pulse 109   Temp 98.6  F (37  C) (Oral)   Resp 17   Ht 1.702 m (5' 7\")   Wt 79.4 kg (175 lb 0.7 oz)   SpO2 95%   BMI 27.42 kg/m      Shift: 1352-1348  VS: Vitally stable.   Neuro: Aox4  Behaviors: Calm and cooperative.   Respiratory: RA   Pain/Nausea: Scheduled Tylenol and Toradol.   Diet: Regular   IV Access: Left and right PIVs SL.   GI/: Adequate rosenberg output. Rosenberg removed at 6. No BM.  Skin: Abdominal incision and lap sites. UTV. Dressing clean dry and intact.   Mobility: Standby Assist. Ambulated in pelaez.   Plan: Continue with the POC.   "

## 2023-01-04 NOTE — PHARMACY-ADMISSION MEDICATION HISTORY
Admission Medication History Completed by Pharmacy    See [facility] Admission Navigator for allergy information, preferred outpatient pharmacy, prior to admission medications and immunization status.     Medication History Sources:     Patient, dispense report    Changes made to PTA medication list (reason):    Added: None    Deleted: None    Changed:   o Added missing instructions to birth control entry.  o Noted that patient reports taking spironolactone 1 tablet (100 mg) daily, but her fills have been for 2 tablets (200 mg) per day. This was changed in February 2022, per dispense report. Last dose of spironolactone was at the beginning of December. Patient has questions about whether she should continue this medication after discharge.     Additional Information:    Confirmed allergy to minocycline - pt reports having lupus-like symptoms that went away when she stopped taking the medication.    Home pharmacy is [facility].     Prior to Admission medications    Medication Sig Last Dose Taking? Auth Provider Long Term End Date   spironolactone (ALDACTONE) 100 MG tablet TAKE 2 TABLETS DAILY 12/2/2022 Yes Jesus Granado MD Yes    SUMAtriptan (IMITREX) 25 MG tablet Take 1 tablet (25 mg) by mouth at onset of headache for migraine May repeat in 2 hours. Max 4 tablets/24 hours. Past Month Yes Rita Blum MD     ENSKYCE 0.15-30 MG-MCG tablet Take 1 tablet by mouth daily 12/17/2022  Reported, Patient Yes        Date completed: 01/03/23    Medication history completed by:   Trinh Rodrigues, PharmD

## 2023-01-04 NOTE — PLAN OF CARE
"Vital signs:  Temp: 98.1  F (36.7  C) Temp src: Oral BP: (!) 123/91 Pulse: 79   Resp: 16 SpO2: 98 % O2 Device: None (Room air) Oxygen Delivery: 3 LPM Height: 170.2 cm (5' 7\") Weight: 82 kg (180 lb 11.2 oz)    AVS reviewed with patient and all questions answered. Patient off the unit in stable condition via wheelchair to discharge pharmacy, all personal items taken from the room.     Problem: Plan of Care - These are the overarching goals to be used throughout the patient stay.    Goal: Plan of Care Review  Description: The Plan of Care Review/Shift note should be completed every shift.  The Outcome Evaluation is a brief statement about your assessment that the patient is improving, declining, or no change.  This information will be displayed automatically on your shift note.  Outcome: Met  Flowsheets (Taken 1/4/2023 9491)  Plan of Care Reviewed With: patient  Overall Patient Progress: improving   Goal Outcome Evaluation:      Plan of Care Reviewed With: patient    Overall Patient Progress: improvingOverall Patient Progress: improving           "

## 2023-01-04 NOTE — DISCHARGE SUMMARY
[facility]    Discharge Summary  Solid Organ Transplant Surgery    Date of Admission:  1/3/2023  Date of Discharge:  1/4/2023  Date of Service (when I saw the patient): 01/04/23    Discharge Diagnoses   Principal Problem:    Encounter for donation of kidney  Active Problems:    Acute post-operative pain      Procedure/Surgery Information   Procedure: Procedure(s):  Laparoscopic Hand Assisted Living Non-Directed Left Kidney Donor   Surgeon(s): Surgeon(s) and Role:     * Arron Moran MD - Primary     * Blas Esteban MD - Resident - Assisting     * Checo Nash MD - Fellow - Assisting             History of Present Illness   Lizzeth Alarcon is a 32 year old female who presented for nephrectomy for kidney donation.    Hospital Course   Lizzeth Alarcon was admitted on 1/3/2023 and underwent nephrectomy for kidney donation. Tolerating oral diet, ambulating, passing stool, and pain controlled by day of discharge.    Patient was advised to use back up birth control due to receiving sugammadex. Patient was advised to temporarily hold spironolactone (used for acne) until follow up with surgeon.    ALYSSA Alvarado CNP    Discharge Disposition   Discharged to home   Condition at discharge: Stable    Primary Care Physician   Rita Blum    Consultations This Hospital Stay   PHARMACY IP CONSULT  SOT MEDICATION HISTORY IP PHARMACY CONSULT    Time Spent on this Encounter   I have spent less than 30 minutes on this discharge.    Discharge Orders      Reason for your hospital stay    Nephrectomy for donation     Activity    Your activity upon discharge: Don't lift anything heavier than 10 pounds for 8 weeks (or longer if your surgeon has discussed this with you).  Walk regularly.    OK to drive only after no longer taking narcotics AND you feel comfortable working the breaks/clutch suddenly if needed.  Limit sports and strenuous activities/'core' exercises for 8  "weeks.  If you experience pain after exertion, try an ice pack or warm pack to the area.   Wear abdominal binder for comfort if you desire.    You have been instructed to avoid NSAIDs (medications such as ibuprofen, \"Motrin\", naproxen, diclofenac) as these medications may affect the remaining kidney. Take other medications as prescribed.    Keep narcotics out of reach of children. When finished with them, please destroy/discard any remaining pills responsibly. Often, your [facility] government office, local police station or pharmacy will have a drug deposit box.     [facility] Follow-up and recommended labs and tests    Follow up with Dr. Moran in Transplant Clinic in 2-3 weeks.  If you need to change your appointment please contact your coordinator at [phone].     When to contact your care team    Your transplant coordinator if you have any of the following:   Swelling, oozing, worsening pain, unusual redness around the incision  Fever of 101 F or higher   Increasing abdominal pain   Nausea or vomiting   Severe diarrhea, bloating, or constipation     Any concerns or questions, please call your Transplant coordinator:    Phone: [phone].  If they are not available, the on call coordinator/MD will help you with your concern.  If you are unable to reach a coordinator and have an urgent medical questions, please call the hospital at [phone] and ask to have the Pancreas Transplant Surgery fellow on-call paged.     Wound care and dressings    Instructions to care for your wound at home: If your incisions have GLUE, gently wash with soap and water, but do not scrub. Do not soak in a bath until the glue has naturally fallen off and any openings are closed (at least 2 weeks).    If your incisions have STERI STRIPS, leave steri strips in place until they curl and peel off. Please don't shower until 48 hours after surgery. Then gently wash with soap and water, but do not scrub them. Do not soak in a bath until " the strips have naturally fallen off and any openings are closed (at least 2 weeks).     Discharge Instructions    Please use back up contraception (such as condoms) for at least the next 2 weeks. Your birth control will be less effective due to a medication interaction.     Diet    Follow this diet upon discharge: Keep yourself well hydrated (goal 1500 ml/day).   Eat lightly the first week as tolerated and avoid constipating foods.  If you are constipated, take a stool softener like Senna, Miralax, a bisacodyl suppository, or a Fleets enema (available over the counter).     Discharge Medications   Current Discharge Medication List      START taking these medications    Details   acetaminophen (TYLENOL) 325 MG tablet Take 2 tablets (650 mg) by mouth every 4 hours as needed for pain  Qty: 100 tablet, Refills: 0    Associated Diagnoses: Encounter for donation of kidney; Acute post-operative pain      bisacodyl (DULCOLAX) 10 MG suppository Place 1 suppository (10 mg) rectally daily as needed for constipation  Qty: 3 suppository, Refills: 0    Associated Diagnoses: Encounter for donation of kidney      methocarbamol (ROBAXIN) 500 MG tablet Take 1 tablet (500 mg) by mouth 4 times daily as needed for muscle spasms  Qty: 20 tablet, Refills: 0    Associated Diagnoses: Encounter for donation of kidney; Acute post-operative pain      ondansetron (ZOFRAN ODT) 4 MG ODT tab Take 1 tablet (4 mg) by mouth every 6 hours as needed for nausea or vomiting  Qty: 5 tablet, Refills: 0    Associated Diagnoses: Encounter for donation of kidney      oxyCODONE (ROXICODONE) 5 MG tablet Take 1-2 tablets (5-10 mg) by mouth every 4 hours as needed for severe pain (7-10)  Qty: 20 tablet, Refills: 0    Associated Diagnoses: Encounter for donation of kidney; Acute post-operative pain      polyethylene glycol (MIRALAX) 17 GM/Dose powder Take 17 g (1 capful) by mouth 2 times daily as needed for constipation  Qty: 527 g, Refills: 0    Associated  Diagnoses: Encounter for donation of kidney      sennosides (SENOKOT) 8.6 MG tablet Take 1-2 tablets by mouth 2 times daily  Qty: 60 tablet, Refills: 0    Associated Diagnoses: Encounter for donation of kidney         CONTINUE these medications which have CHANGED    Details   spironolactone (ALDACTONE) 100 MG tablet Do not take until follow up with surgeon. Can cause dehydration and high potassium levels.    Associated Diagnoses: Acne vulgaris         CONTINUE these medications which have NOT CHANGED    Details   SUMAtriptan (IMITREX) 25 MG tablet Take 1 tablet (25 mg) by mouth at onset of headache for migraine May repeat in 2 hours. Max 4 tablets/24 hours.  Qty: 30 tablet, Refills: 0    Associated Diagnoses: Migraine without aura and without status migrainosus, not intractable      ENSKYCE 0.15-30 MG-MCG tablet Take 1 tablet by mouth daily  Refills: 0           Allergies   Allergies   Allergen Reactions     Minocycline      Joint pains     Data   Most Recent 3 Creatinines:Recent Labs   Lab Test 01/04/23  0756 12/19/22  1406 10/28/22  0732   CR 1.56* 0.75 0.94     Most Recent 3 Hemoglobins:Recent Labs   Lab Test 01/04/23  0756 01/03/23  1232 12/19/22  1406   HGB 12.6 12.0 12.1

## 2023-01-04 NOTE — PLAN OF CARE
"Vital signs:  Temp: 96.8  F (36  C) Temp src: Oral BP: 130/84 Pulse: 89   Resp: 24 SpO2: 94 %   Oxygen Delivery: 3 LPM Height: 170.2 cm (5' 7\") Weight: 79.4 kg (175 lb 0.7 oz)    Pt admitted to the unit in stable condition from PACU. Incision to abd CDI, abd binder in place for pain. PRN medications given with good effect. Ambulated in pelaez x1 with standby assist. Tolerated regular diet with no nausea.     Problem: Plan of Care - These are the overarching goals to be used throughout the patient stay.    Goal: Plan of Care Review  Description: The Plan of Care Review/Shift note should be completed every shift.  The Outcome Evaluation is a brief statement about your assessment that the patient is improving, declining, or no change.  This information will be displayed automatically on your shift note.  Outcome: Progressing  Flowsheets (Taken 1/3/2023 1414)  Plan of Care Reviewed With: patient  Overall Patient Progress: improving   Goal Outcome Evaluation:      Plan of Care Reviewed With: patient    Overall Patient Progress: improvingOverall Patient Progress: improving           "

## 2023-01-04 NOTE — PHARMACY-TRANSPLANT NOTE
Solid Organ Transplant Donor Prior to Discharge Note  32 year old female s/p kidney donation surgery on 1/3/2022.     Medication considerations at discharge include:    - Recommend using backup contraception method for next week due to interaction between hormonal birth control and sugammadex    - Continue to hold spironolactone post-nephrectomy until outpatient follow-up with transplant surgery    Pharmacy has monitored for any potential medication issues.  In anticipation for discharge, medication therapy needs have been addressed daily throughout the current admission via multidisciplinary rounds and/or discussions, order verification, daily clinical pharmacy review, and communication with prescribers.    Kevin Garcia, PharmD

## 2023-01-04 NOTE — CONSULTS
"Transplant Admission Psychosocial Assessment    Patient Name: Lizzeth Alarcon  : 1990  Age: 32 year old  MRN: [id number]  Date of Initial Social Work Evaluation: 10/28/22     Lizzeth is a NDD who is post op day 1. SW met with Lizzeth over the phone. She states that she is feeling well, just having some \"gas pains.\" She is feeling much better than yesterday. Her plan is to go home today and her mother will take her home and father will stay with her for a few days. She does NOT plan to use   NKR donor shield, as her work provides her with full salary benefits while she is off.     Lizzeth haphazardly found her recipient over Facebook-- SW provided psychosocial support about this and discussed the emotions that come along with this. She expressed understanding.     Presenting Information   Living Situation: lives alone   If not local, plans for short term stay:  n/a  Previous Functional Status: independent   Cultural/Language/Spiritual Considerations: none discussed     Support System  Primary Support Person: parents   Other support:  n/a  Plan for support in immediate post-transplant period: home with family     Health Care Directive  Decision Maker: self  Alternate Decision Maker: parents  Health Care Directive: Provided education    Mental Health/Coping:   History of Mental Health: none-- does see a counselor for work stress   History of Chemical Health: none  Current status: appropriate   Coping: appropriate   Services Needed/Recommended: none     Financial   Income: wages through [employer]   Impact of transplant on income: none  Insurance and medication coverage: n/a  Financial concerns: none  Resources needed: none      Assessment and recommendations and plan:  SW to remain available to Lizzeth post discharge    TAMERA Sanderson, [facility] HospitalSW   Living Donor   [facility]  Direct: [phone]  E-Mail: " [email or web address]

## 2023-01-04 NOTE — PROGRESS NOTES
INDEPENDENT LIVING DONOR ADVOCATE NOTE:  D:  Lizzeth Alarcon is a living Nondirected kidney donor, POD 1.  I:  I called her this afternoon.  She had just gotten home from the hospital and her mother was taking care of her.  I thanked her for her gift of donation.  She felt very blessed that she was able to donate.  She belongs to a Facebook donor group.  Through this group, she learned from someone who was an advanced donor, that Lizzeth's kidney actually went to that person's intended recipient.  She states it is very rewarding to actually know who received her kidney and a little about his life. It brings her comfort.  I  Thanked her for her gift of  donation and to assess for any VICTORIANO needs.  I assessed the patient's mood/affect, plans for recovery, and any feelings of regret/remorse regarding donation.  We reviewed the importance of completing follow-up labs and surveys at six months, 1 year and 2 years after donation to monitor kidney health and the impact donation has had on their life post donation.   A:  She is resting comfortably at her mother's home.  She felt pleased about the care she received in the hospital.  She was in a good mood, tearful about having gotten to learn a little more about the recipient, and very positive.  She states that pain is well controlled.  Patient describes recovery as going well.  She and I discussed how to reach me should she have any post operative VICTORIANO needs.  She reports no feelings of regret or remorse about donation.  She denies any discharge planning needs at this time.  Patient  discharged to home with the care and support of her family.   P: VICTORIANO will remain available to assist with any support and VICTORIANO needs, both inpatient and outpatient, as needed.  Patient is aware of follow-up recommendations and has my contact information.

## 2023-01-05 ENCOUNTER — TELEPHONE (OUTPATIENT)
Dept: TRANSPLANT | Facility: CLINIC | Age: 33
End: 2023-01-05

## 2023-01-05 NOTE — TELEPHONE ENCOUNTER
Writer calling to check in on Lizzeth post op, after being discharged yesterday. Phone went to - left  asking Lizzeth to return call. Will await reply.

## 2023-01-06 ENCOUNTER — TELEPHONE (OUTPATIENT)
Dept: TRANSPLANT | Facility: CLINIC | Age: 33
End: 2023-01-06

## 2023-01-06 DIAGNOSIS — Z52.4 ENCOUNTER FOR DONATION OF KIDNEY: Primary | ICD-10-CM

## 2023-01-06 NOTE — TELEPHONE ENCOUNTER
Writer calling to check in on Lizzeth 3 days post op.     Post-hospital outreach call made to check in post 3 days donation.     Lizzeth reports overall feeling well.    Incision: Clean, dry, intact  Pain: manageable on tylenol alone  Bowels: regular BMs,    Appetite: fair  Fluids: staying well hydrated  Urinary: no issues with urination  Activity: up and walking without difficulty, using abd binder when walking.     Reviewed: abdominal precautions, lifting restrictions.    Next appointment: 1/23    Reminded Lizzeth about UNOS follow-up requirements.     Lizzeth knows how to get in touch with me or an on call coordinator with any questions or concerns.

## 2023-01-10 ENCOUNTER — TELEPHONE (OUTPATIENT)
Dept: TRANSPLANT | Facility: CLINIC | Age: 33
End: 2023-01-10

## 2023-01-10 NOTE — TELEPHONE ENCOUNTER
Writer calling to check in with Lizzeth, one week post op. She states the weekend went okay- she is not needing pain medication besides tylenol. She states being uncomfortable, not pain. Appetite is okay, no issues urinating or BM, incision are clean, dry, intact. Lizzeth is able to do light household duties and is walking. Discussed staying within weight restriction and follow up appointment dates. Lizzeth will reach out with any further questions or concerns. Lizzeth verbalized understanding and is in agreement with plan.

## 2023-01-23 ENCOUNTER — LAB (OUTPATIENT)
Dept: LAB | Facility: CLINIC | Age: 33
End: 2023-01-23
Payer: COMMERCIAL

## 2023-01-23 ENCOUNTER — OFFICE VISIT (OUTPATIENT)
Dept: TRANSPLANT | Facility: CLINIC | Age: 33
End: 2023-01-23
Attending: TRANSPLANT SURGERY

## 2023-01-23 VITALS
WEIGHT: 171.8 LBS | OXYGEN SATURATION: 100 % | DIASTOLIC BLOOD PRESSURE: 82 MMHG | SYSTOLIC BLOOD PRESSURE: 126 MMHG | HEART RATE: 78 BPM | BODY MASS INDEX: 26.91 KG/M2

## 2023-01-23 DIAGNOSIS — Z52.4 ENCOUNTER FOR DONATION OF KIDNEY: ICD-10-CM

## 2023-01-23 DIAGNOSIS — Z00.5 EXAMINATION OF POTENTIAL DONOR OF ORGAN AND TISSUE: ICD-10-CM

## 2023-01-23 DIAGNOSIS — Z52.4 KIDNEY DONOR: Primary | ICD-10-CM

## 2023-01-23 LAB — HOLD SPECIMEN: NORMAL

## 2023-01-23 PROCEDURE — G0463 HOSPITAL OUTPT CLINIC VISIT: HCPCS | Performed by: TRANSPLANT SURGERY

## 2023-01-23 PROCEDURE — 88240 CELL CRYOPRESERVE/STORAGE: CPT | Performed by: PATHOLOGY

## 2023-01-23 PROCEDURE — 99024 POSTOP FOLLOW-UP VISIT: CPT | Performed by: TRANSPLANT SURGERY

## 2023-01-23 PROCEDURE — 36415 COLL VENOUS BLD VENIPUNCTURE: CPT | Performed by: PATHOLOGY

## 2023-01-23 NOTE — LETTER
1/23/2023         RE: Lizzeth Alarcon  [address]  [facility] [address]        Dear Colleague,    Thank you for referring your patient, Lizzeth Alarcon, to the [facility]. Please see a copy of my visit note below.      Transplant Surgery Kidney Donor Progress Note     Medical record number: [id number]  YOB: 1990,   Date of Visit:  02/11/2023  For followup after kidney donation.    Assessment and Recommendations: Ms. Alarcon is doing well after kidney donation.     1. Lifting restrictions: 10 lbs until 8 weeks post donation.  2. Followup: 4 weeks as needed  3. Return to work to be determined per patient's progress, expected between 6-8 weeks after surgery.    Total time: 15 minutes  Counselling time: 10 minutes    Arron Moran MD, PhD  Associate Professor of Surgery        ------------------------------------------------------------------------------------------    S: Ms. Alarcon donate a kidney 2 weeks ago and is doing well, reporting no fevers, chills, dysuria.  She is not taking narcotic analgesia.  She is not constipated.  She is mostly back to routine activities of daily living and is feeling ready to return to work at this time.    No issues  Tylenol PM  Returns to work tomorrow      Medications:  Prescription Medications as of 2/11/2023       Rx Number Disp Refills Start End Last Dispensed Date Next Fill Date Owning Pharmacy    acetaminophen (TYLENOL) 325 MG tablet  100 tablet 0 1/4/2023    [facility] [place], [place] - [address] [facility]    Sig: Take 2 tablets (650 mg) by mouth every 4 hours as needed for pain    Class: E-Prescribe    Route: Oral    bisacodyl (DULCOLAX) 10 MG suppository  3 suppository 0 1/4/2023    [facility] [place], [place] - [address] [facility]    Sig: Place 1 suppository (10 mg) rectally daily as needed for constipation    Class: E-Prescribe    Route: Rectal    ENSKYCE 0.15-30 MG-MCG  tablet   0 5/19/2019    [id number] [place] [address] [address] [facility]    Sig: Take 1 tablet by mouth daily    Class: Historical    Route: Oral    methocarbamol (ROBAXIN) 500 MG tablet  20 tablet 0 1/4/2023    [address]    Sig: Take 1 tablet (500 mg) by mouth 4 times daily as needed for muscle spasms    Class: E-Prescribe    Route: Oral    Renewals     Renewal requests to authorizing provider (Yuliya Ventura APRN CNP) <b>prohibited</b>          ondansetron (ZOFRAN ODT) 4 MG ODT tab  5 tablet 0 1/4/2023    [address]    Sig: Take 1 tablet (4 mg) by mouth every 6 hours as needed for nausea or vomiting    Class: E-Prescribe    Route: Oral    Renewals     Renewal requests to authorizing provider (Yuliya Ventura APRN CNP) <b>prohibited</b>          oxyCODONE (ROXICODONE) 5 MG tablet  20 tablet 0 1/4/2023        Sig: Take 1-2 tablets (5-10 mg) by mouth every 4 hours as needed for severe pain (7-10)    Class: Local Print    Earliest Fill Date: 1/4/2023    Route: Oral    polyethylene glycol (MIRALAX) 17 GM/Dose powder  527 g 0 1/4/2023    [address]    Sig: Take 17 g (1 capful) by mouth 2 times daily as needed for constipation    Class: E-Prescribe    Route: Oral    sennosides (SENOKOT) 8.6 MG tablet  60 tablet 0 1/4/2023    [address]    Sig: Take 1-2 tablets by mouth 2 times daily    Class: E-Prescribe    Route: Oral    spironolactone (ALDACTONE) 100 MG tablet    1/4/2023        Sig: Do not take until follow up with surgeon. Can cause dehydration and high potassium levels.    Class: No Print Out    SUMAtriptan (IMITREX) 25 MG tablet  30 tablet 0 8/3/2022    [facility] - [address]     Sig: Take 1 tablet (25 mg) by mouth at onset of headache for migraine May repeat in 2 hours. Max 4 tablets/24 hours.    Class: E-Prescribe    Route: Oral          Exam:     /82   Pulse 78   Wt 77.9 kg (171 lb 12.8 oz)   SpO2 100%   BMI 26.91 kg/m      Appearance: in no apparent distress.   Skin: normal  Head and Neck: Normal, no rashes or jaundice  Respiratory: normal respiratory excursions, no audible wheeze  Cardiovascular: RRR  Abdomen: flat, No distention incisions clean, dry, and intact   Extremeties: Edema, none  Neuro: grossly normal       Labs:   Lab on 01/23/2023   Component Date Value Ref Range Status     Hold Specimen 01/23/2023 Specimen Received   Final           Again, thank you for allowing me to participate in the care of your patient.      Sincerely,    Arron Moran MD

## 2023-01-23 NOTE — PROGRESS NOTES
Transplant Surgery Kidney Donor Progress Note     Medical record number: [id number]  YOB: 1990,   Date of Visit:  02/11/2023  For followup after kidney donation.    Assessment and Recommendations: Ms. Alarcon is doing well after kidney donation.     1. Lifting restrictions: 10 lbs until 8 weeks post donation.  2. Followup: 4 weeks as needed  3. Return to work to be determined per patient's progress, expected between 6-8 weeks after surgery.    Total time: 15 minutes  Counselling time: 10 minutes    Arron Moran MD, PhD  Associate Professor of Surgery        ------------------------------------------------------------------------------------------    S: Ms. Alarcon donate a kidney 2 weeks ago and is doing well, reporting no fevers, chills, dysuria.  She is not taking narcotic analgesia.  She is not constipated.  She is mostly back to routine activities of daily living and is feeling ready to return to work at this time.    No issues  Tylenol PM  Returns to work tomorrow      Medications:  Prescription Medications as of 2/11/2023       Rx Number Disp Refills Start End Last Dispensed Date Next Fill Date Owning Pharmacy    acetaminophen (TYLENOL) 325 MG tablet  100 tablet 0 1/4/2023    [address]    Sig: Take 2 tablets (650 mg) by mouth every 4 hours as needed for pain    Class: E-Prescribe    Route: Oral    bisacodyl (DULCOLAX) 10 MG suppository  3 suppository 0 1/4/2023    [address]    Sig: Place 1 suppository (10 mg) rectally daily as needed for constipation    Class: E-Prescribe    Route: Rectal    ENSKYCE 0.15-30 MG-MCG tablet   0 5/19/2019    [facility] [address] [place], [place] - [facility] [address] [facility]    Sig: Take 1 tablet by mouth daily    Class: Historical    Route: Oral    methocarbamol (ROBAXIN) 500 MG tablet  20 tablet 0 1/4/2023    [facility] [address]    Sig: Take 1 tablet (500 mg) by mouth 4 times daily as needed for muscle spasms    Class: E-Prescribe    Route: Oral    Renewals     Renewal requests to authorizing provider (Yuliya Ventura APRN CNP) <b>prohibited</b>          ondansetron (ZOFRAN ODT) 4 MG ODT tab  5 tablet 0 1/4/2023    [address]    Sig: Take 1 tablet (4 mg) by mouth every 6 hours as needed for nausea or vomiting    Class: E-Prescribe    Route: Oral    Renewals     Renewal requests to authorizing provider (Yuliya Ventura APRN CNP) <b>prohibited</b>          oxyCODONE (ROXICODONE) 5 MG tablet  20 tablet 0 1/4/2023        Sig: Take 1-2 tablets (5-10 mg) by mouth every 4 hours as needed for severe pain (7-10)    Class: Local Print    Earliest Fill Date: 1/4/2023    Route: Oral    polyethylene glycol (MIRALAX) 17 GM/Dose powder  527 g 0 1/4/2023    [address]    Sig: Take 17 g (1 capful) by mouth 2 times daily as needed for constipation    Class: E-Prescribe    Route: Oral    sennosides (SENOKOT) 8.6 MG tablet  60 tablet 0 1/4/2023    [address]    Sig: Take 1-2 tablets by mouth 2 times daily    Class: E-Prescribe    Route: Oral    spironolactone (ALDACTONE) 100 MG tablet    1/4/2023        Sig: Do not take until follow up with surgeon. Can cause dehydration and high potassium levels.    Class: No Print Out    SUMAtriptan (IMITREX) 25 MG tablet  30 tablet 0 8/3/2022    [facility] - [place], [place] - [facility]    Sig: Take 1 tablet (25 mg) by mouth at onset of headache for migraine May repeat in 2 hours. Max 4 tablets/24 hours.    Class: E-Prescribe    Route: Oral          Exam:     /82   Pulse 78   Wt 77.9 kg (171 lb 12.8 oz)   SpO2 100%   BMI 26.91 kg/m       Appearance: in no apparent distress.   Skin: normal  Head and Neck: Normal, no rashes or jaundice  Respiratory: normal respiratory excursions, no audible wheeze  Cardiovascular: RRR  Abdomen: flat, No distention incisions clean, dry, and intact   Extremeties: Edema, none  Neuro: grossly normal       Labs:   Lab on 01/23/2023   Component Date Value Ref Range Status     Hold Specimen 01/23/2023 Specimen Received   Final

## 2023-01-27 DIAGNOSIS — Z52.4 KIDNEY DONOR: Primary | ICD-10-CM

## 2023-02-10 ENCOUNTER — LAB (OUTPATIENT)
Dept: LAB | Facility: CLINIC | Age: 33
End: 2023-02-10
Attending: SURGERY
Payer: COMMERCIAL

## 2023-02-10 DIAGNOSIS — Z52.4 KIDNEY DONOR: ICD-10-CM

## 2023-02-10 LAB
ALBUMIN MFR UR ELPH: <6 MG/DL (ref 1–14)
ALBUMIN UR-MCNC: NEGATIVE MG/DL
APPEARANCE UR: CLEAR
BILIRUB UR QL STRIP: NEGATIVE
COLOR UR AUTO: ABNORMAL
CREAT SERPL-MCNC: 1.32 MG/DL (ref 0.51–0.95)
CREAT UR-MCNC: 21.1 MG/DL
CREAT UR-MCNC: 21.6 MG/DL
GFR SERPL CREATININE-BSD FRML MDRD: 55 ML/MIN/1.73M2
GLUCOSE UR STRIP-MCNC: NEGATIVE MG/DL
HGB UR QL STRIP: NEGATIVE
KETONES UR STRIP-MCNC: NEGATIVE MG/DL
LEUKOCYTE ESTERASE UR QL STRIP: ABNORMAL
MICROALBUMIN UR-MCNC: <12 MG/L
MICROALBUMIN/CREAT UR: NORMAL MG/G{CREAT}
NITRATE UR QL: NEGATIVE
PH UR STRIP: 6 [PH] (ref 5–7)
PROT/CREAT 24H UR: NORMAL MG/G{CREAT}
RBC URINE: 1 /HPF
SP GR UR STRIP: 1 (ref 1–1.03)
SQUAMOUS EPITHELIAL: 1 /HPF
UROBILINOGEN UR STRIP-MCNC: NORMAL MG/DL
WBC URINE: 1 /HPF

## 2023-02-10 PROCEDURE — 82043 UR ALBUMIN QUANTITATIVE: CPT | Mod: 90 | Performed by: PATHOLOGY

## 2023-02-10 PROCEDURE — 81001 URINALYSIS AUTO W/SCOPE: CPT | Performed by: PATHOLOGY

## 2023-02-10 PROCEDURE — 82565 ASSAY OF CREATININE: CPT | Performed by: PATHOLOGY

## 2023-02-10 PROCEDURE — 36415 COLL VENOUS BLD VENIPUNCTURE: CPT | Performed by: PATHOLOGY

## 2023-02-10 PROCEDURE — 84156 ASSAY OF PROTEIN URINE: CPT | Mod: 90 | Performed by: PATHOLOGY

## 2023-02-10 PROCEDURE — 99000 SPECIMEN HANDLING OFFICE-LAB: CPT | Performed by: PATHOLOGY

## 2023-02-10 PROCEDURE — 82570 ASSAY OF URINE CREATININE: CPT | Mod: 90 | Performed by: PATHOLOGY

## 2023-04-20 ENCOUNTER — PATIENT OUTREACH (OUTPATIENT)
Dept: CARE COORDINATION | Facility: CLINIC | Age: 33
End: 2023-04-20
Payer: COMMERCIAL

## 2023-05-19 ASSESSMENT — ENCOUNTER SYMPTOMS
ABDOMINAL PAIN: 0
NAUSEA: 0
SORE THROAT: 0
SHORTNESS OF BREATH: 0
DIZZINESS: 0
HEADACHES: 0
DIARRHEA: 0
DYSURIA: 0
WEAKNESS: 0
HEARTBURN: 0
MYALGIAS: 0
COUGH: 0
ARTHRALGIAS: 0
CHILLS: 0
BREAST MASS: 0
FEVER: 0
EYE PAIN: 0
PALPITATIONS: 0
CONSTIPATION: 0
NERVOUS/ANXIOUS: 0
HEMATURIA: 0
JOINT SWELLING: 0
HEMATOCHEZIA: 0
FREQUENCY: 0
PARESTHESIAS: 0

## 2023-05-26 ENCOUNTER — OFFICE VISIT (OUTPATIENT)
Dept: FAMILY MEDICINE | Facility: CLINIC | Age: 33
End: 2023-05-26
Payer: COMMERCIAL

## 2023-05-26 VITALS
SYSTOLIC BLOOD PRESSURE: 121 MMHG | RESPIRATION RATE: 11 BRPM | TEMPERATURE: 98.6 F | OXYGEN SATURATION: 100 % | WEIGHT: 172.4 LBS | HEART RATE: 66 BPM | HEIGHT: 67 IN | DIASTOLIC BLOOD PRESSURE: 82 MMHG | BODY MASS INDEX: 27.06 KG/M2

## 2023-05-26 DIAGNOSIS — G43.009 MIGRAINE WITHOUT AURA AND WITHOUT STATUS MIGRAINOSUS, NOT INTRACTABLE: ICD-10-CM

## 2023-05-26 DIAGNOSIS — Z52.4 KIDNEY DONOR: ICD-10-CM

## 2023-05-26 DIAGNOSIS — Z00.00 ROUTINE GENERAL MEDICAL EXAMINATION AT A HEALTH CARE FACILITY: Primary | ICD-10-CM

## 2023-05-26 PROCEDURE — 99395 PREV VISIT EST AGE 18-39: CPT | Performed by: FAMILY MEDICINE

## 2023-05-26 RX ORDER — SUMATRIPTAN 25 MG/1
25 TABLET, FILM COATED ORAL
Qty: 30 TABLET | Refills: 0 | Status: SHIPPED | OUTPATIENT
Start: 2023-05-26

## 2023-05-26 ASSESSMENT — ENCOUNTER SYMPTOMS
CHILLS: 0
ABDOMINAL PAIN: 0
SORE THROAT: 0
DIZZINESS: 0
FEVER: 0
ARTHRALGIAS: 0
WEAKNESS: 0
MYALGIAS: 0
BREAST MASS: 0
NAUSEA: 0
COUGH: 0
JOINT SWELLING: 0
DYSURIA: 0
CONSTIPATION: 0
HEARTBURN: 0
EYE PAIN: 0
PARESTHESIAS: 0
HEMATOCHEZIA: 0
HEMATURIA: 0
FREQUENCY: 0
SHORTNESS OF BREATH: 0
PALPITATIONS: 0
HEADACHES: 0
DIARRHEA: 0
NERVOUS/ANXIOUS: 0

## 2023-05-26 NOTE — PROGRESS NOTES
SUBJECTIVE:   CC: Lizzeth is an 33 year old who presents for preventive health visit.        View : No data to display.            Patient has been advised of split billing requirements and indicates understanding: Yes  Healthy Habits:     Getting at least 3 servings of Calcium per day:  Yes    Bi-annual eye exam:  Yes    Dental care twice a year:  Yes    Sleep apnea or symptoms of sleep apnea:  None    Diet:  Regular (no restrictions)    Frequency of exercise:  2-3 days/week    Duration of exercise:  15-30 minutes    Taking medications regularly:  Yes    Medication side effects:  Not applicable    PHQ-2 Total Score: 1    Additional concerns today:  Yes      1) things are under control , doing well       Today's PHQ-2 Score:       5/25/2023    11:00 AM   PHQ-2 ( 1999 Pf[employer])   Q1: Little interest or pleasure in doing things 0   Q2: Feeling down, depressed or hopeless 1   PHQ-2 Score 1   Q1: Little interest or pleasure in doing things Not at all   Q2: Feeling down, depressed or hopeless Several days   PHQ-2 Score 1           Social History     Tobacco Use     Smoking status: Never     Passive exposure: Never     Smokeless tobacco: Never     Tobacco comments:     no second hand smoke   Vaping Use     Vaping status: Never Used   Substance Use Topics     Alcohol use: Yes     Alcohol/week: 5.0 - 7.0 standard drinks of alcohol     Types: 5 - 7 Standard drinks or equivalent per week             5/19/2023     8:54 AM   Alcohol Use   Prescreen: >3 drinks/day or >7 drinks/week? No          View : No data to display.              Reviewed orders with patient.  Reviewed health maintenance and updated orders accordingly - Yes  Lab work is in process  Labs reviewed in EPIC  BP Readings from Last 3 Encounters:   05/26/23 121/82   01/23/23 126/82   01/04/23 (!) 123/91    Wt Readings from Last 3 Encounters:   05/26/23 78.2 kg (172 lb 6.4 oz)   01/23/23 77.9 kg (171 lb 12.8 oz)   01/04/23 82 kg (180 lb 11.2 oz)                   Patient Active Problem List   Diagnosis     Acne     Contraceptive management     Keloid scar     Contraception     BMI > 25     Migraine without aura and without status migrainosus, not intractable     Transplant donor evaluation     Encounter for donation of kidney     Acute post-operative pain     Past Surgical History:   Procedure Laterality Date     LAPAROSCOPIC DONOR HAND ASSISTED KIDNEY NON-DIRECTED Left 1/3/2023    Procedure: Laparoscopic Hand Assisted Living Non-Directed Left Kidney Donor;  Surgeon: Arron Moran MD;  Location: [facility] OR     LASIK Bilateral      WISDOM TOOTH EXTRACTION         Social History     Tobacco Use     Smoking status: Never     Passive exposure: Never     Smokeless tobacco: Never     Tobacco comments:     no second hand smoke   Vaping Use     Vaping status: Never Used   Substance Use Topics     Alcohol use: Yes     Alcohol/week: 5.0 - 7.0 standard drinks of alcohol     Types: 5 - 7 Standard drinks or equivalent per week     Family History   Problem Relation Age of Onset     Thyroid Disease Mother      Depression Mother      Mental Illness Mother      Asthma Father      Hypertension Sister      Respiratory Brother         Exercised induced Asthma     Breast Cancer Other      Colon Cancer No family hx of      Diabetes No family hx of      Myocardial Infarction No family hx of      Melanoma No family hx of      Skin Cancer No family hx of      Kidney Disease No family hx of          Current Outpatient Medications   Medication Sig Dispense Refill     SUMAtriptan (IMITREX) 25 MG tablet Take 1 tablet (25 mg) by mouth at onset of headache for migraine May repeat in 2 hours. Max 4 tablets/24 hours. 30 tablet 0     Allergies   Allergen Reactions     Minocycline      Joint pains     Recent Labs   Lab Test 02/10/23  1040 01/04/23  0756 12/19/22  1406 10/28/22  0732 06/02/22  0816 04/29/21  0846 10/14/19  0951 07/22/19  1619   A1C  --   --   --  5.2  --   --   --   --    LDL  --   --   --   145* 115* 106*   < >  --    HDL  --   --   --  62 83 81   < >  --    TRIG  --   --   --  96 106 67   < >  --    ALT  --   --   --  32  --   --   --   --    CR 1.32* 1.56*   < > 0.94  --   --   --  0.74   GFRESTIMATED 55* 45*   < > 82  --   --   --  >90   GFRESTBLACK  --   --   --   --   --   --   --  >90   POTASSIUM  --   --   --  4.0  --   --   --  4.1    < > = values in this interval not displayed.        Breast Cancer Screenin/23/2021     8:05 AM   Breast CA Risk Assessment (FHS-7)   Do you have a family history of breast, colon, or ovarian cancer? No / Unknown         Patient under 40 years of age: Routine Mammogram Screening not recommended.   Pertinent mammograms are reviewed under the imaging tab.    History of abnormal Pap smear: NO - age 30-65 PAP every 5 years with negative HPV co-testing recommended      Latest Ref Rng & Units 2021     8:39 AM 2021     8:31 AM 2017     8:03 AM   PAP / HPV   PAP (Historical)   NIL   NIL     HPV 16 DNA NEG^Negative Negative       HPV 18 DNA NEG^Negative Negative       Other HR HPV NEG^Negative Negative         Reviewed and updated as needed this visit by clinical staff   Tobacco  Allergies  Meds            Maternal aunt had breast cancer ,   Reviewed and updated as needed this visit by Provider    Not on the birth control now , not too bad as far as menstrual cycles , q month , not to o heavy only 3 or 4 days     Slowly gettting back into exercising , walking now    Kidney donation surgery in January                  Past Medical History:   Diagnosis Date     Acne      Closed fracture of olecranon process of ulna 2006    left olecranon fracture     Contraceptive management      Exercise-induced asthma      Migraine headache       Past Surgical History:   Procedure Laterality Date     LAPAROSCOPIC DONOR HAND ASSISTED KIDNEY NON-DIRECTED Left 1/3/2023    Procedure: Laparoscopic Hand Assisted Living Non-Directed Left Kidney Donor;  Surgeon:  "Finger, Arron Cruz MD;  Location:  OR     [facility] Bilateral      WISDOM TOOTH EXTRACTION         Review of Systems   Constitutional: Negative for chills and fever.   HENT: Negative for congestion, ear pain, hearing loss and sore throat.    Eyes: Negative for pain and visual disturbance.   Respiratory: Negative for cough and shortness of breath.    Cardiovascular: Negative for chest pain, palpitations and peripheral edema.   Gastrointestinal: Negative for abdominal pain, constipation, diarrhea, heartburn, hematochezia and nausea.   Breasts:  Negative for tenderness, breast mass and discharge.   Genitourinary: Negative for dysuria, frequency, genital sores, hematuria, pelvic pain, urgency, vaginal bleeding and vaginal discharge.   Musculoskeletal: Negative for arthralgias, joint swelling and myalgias.   Skin: Negative for rash.   Neurological: Negative for dizziness, weakness, headaches and paresthesias.   Psychiatric/Behavioral: Negative for mood changes. The patient is not nervous/anxious.      CONSTITUTIONAL: NEGATIVE for fever, chills, change in weight  INTEGUMENTARU/SKIN: NEGATIVE for worrisome rashes, moles or lesions  EYES: NEGATIVE for vision changes or irritation  ENT: NEGATIVE for ear, mouth and throat problems  RESP: NEGATIVE for significant cough or SOB  BREAST: NEGATIVE for masses, tenderness or discharge  CV: NEGATIVE for chest pain, palpitations or peripheral edema  GI: NEGATIVE for nausea, abdominal pain, heartburn, or change in bowel habits  : NEGATIVE for unusual urinary or vaginal symptoms. Periods are regular.  MUSCULOSKELETAL: NEGATIVE for significant arthralgias or myalgia  NEURO: NEGATIVE for weakness, dizziness or paresthesias  PSYCHIATRIC: NEGATIVE for changes in mood or affect     OBJECTIVE:   /82   Pulse 66   Temp 98.6  F (37  C) (Tympanic)   Resp 11   Ht 1.702 m (5' 7\")   Wt 78.2 kg (172 lb 6.4 oz)   LMP 05/07/2023 (Exact Date)   SpO2 100%   BMI 27.00 kg/m    Physical " Exam  GENERAL: healthy, alert and no distress  EYES: Eyes grossly normal to inspection, PERRL and conjunctivae and sclerae normal  HENT: ear canals and TM's normal, nose and mouth without ulcers or lesions  NECK: no adenopathy, no asymmetry, masses, or scars and thyroid normal to palpation  RESP: lungs clear to auscultation - no rales, rhonchi or wheezes  BREAST: normal without masses, tenderness or nipple discharge and no palpable axillary masses or adenopathy  CV: regular rate and rhythm, normal S1 S2, no S3 or S4, no murmur, click or rub, no peripheral edema and peripheral pulses strong  ABDOMEN: soft, nontender, no hepatosplenomegaly, no masses and bowel sounds normal  MS: no gross musculoskeletal defects noted, no edema  SKIN: no suspicious lesions or rashes  NEURO: Normal strength and tone, mentation intact and speech normal  PSYCH: mentation appears normal, affect normal/bright    Diagnostic Test Results:  Labs reviewed in Epic  No results found for this or any previous visit (from the past 24 hour(s)).    ASSESSMENT/PLAN:   (Z00.00) Routine general medical examination at a health care facility  (primary encounter diagnosis)  Comment: Discussed diet,calcium,exercise.Went over self breast exam.Thin prep was NOT done.Eyes and teeth UTD.No immunizations needed today.See orders below for tests ordered and screening needed.    Plan: as above     (G43.009) Migraine without aura and without status migrainosus, not intractable  Comment: she has stopped the BC pills last few months after donating a kidney and noticed she gets less migraines now   Plan: SUMAtriptan (IMITREX) 25 MG tablet        Refilled her Imitrex just in case       Patient has been advised of split billing requirements and indicates understanding: Yes      COUNSELING:  Reviewed preventive health counseling, as reflected in patient instructions       Regular exercise       Healthy diet/nutrition       Vision screening       Contraception      BMI:  "  Estimated body mass index is 27 kg/m  as calculated from the following:    Height as of this encounter: 1.702 m (5' 7\").    Weight as of this encounter: 78.2 kg (172 lb 6.4 oz).         She reports that she has never smoked. She has never been exposed to tobacco smoke. She has never used smokeless tobacco.      Rita Blum MD  [facility]  "

## 2023-07-10 ENCOUNTER — LAB (OUTPATIENT)
Dept: LAB | Facility: CLINIC | Age: 33
End: 2023-07-10
Attending: TRANSPLANT SURGERY

## 2023-07-10 DIAGNOSIS — Z52.4 KIDNEY DONOR: ICD-10-CM

## 2023-07-10 LAB
ALBUMIN MFR UR ELPH: <6 MG/DL
ALBUMIN UR-MCNC: NEGATIVE MG/DL
APPEARANCE UR: CLEAR
BILIRUB UR QL STRIP: NEGATIVE
COLOR UR AUTO: ABNORMAL
CREAT SERPL-MCNC: 1.1 MG/DL (ref 0.51–0.95)
CREAT UR-MCNC: 13.3 MG/DL
CREAT UR-MCNC: 13.6 MG/DL
GFR SERPL CREATININE-BSD FRML MDRD: 68 ML/MIN/1.73M2
GLUCOSE UR STRIP-MCNC: NEGATIVE MG/DL
HGB UR QL STRIP: NEGATIVE
KETONES UR STRIP-MCNC: NEGATIVE MG/DL
LEUKOCYTE ESTERASE UR QL STRIP: NEGATIVE
MICROALBUMIN UR-MCNC: <12 MG/L
MICROALBUMIN/CREAT UR: NORMAL MG/G{CREAT}
NITRATE UR QL: NEGATIVE
PH UR STRIP: 6 [PH] (ref 5–7)
PROT/CREAT 24H UR: NORMAL MG/G{CREAT}
RBC URINE: <1 /HPF
SP GR UR STRIP: 1 (ref 1–1.03)
SQUAMOUS EPITHELIAL: 1 /HPF
UROBILINOGEN UR STRIP-MCNC: NORMAL MG/DL
WBC URINE: <1 /HPF

## 2023-07-10 PROCEDURE — 82565 ASSAY OF CREATININE: CPT | Performed by: PATHOLOGY

## 2023-07-10 PROCEDURE — 99000 SPECIMEN HANDLING OFFICE-LAB: CPT | Performed by: PATHOLOGY

## 2023-07-10 PROCEDURE — 84156 ASSAY OF PROTEIN URINE: CPT | Performed by: PATHOLOGY

## 2023-07-10 PROCEDURE — 36415 COLL VENOUS BLD VENIPUNCTURE: CPT | Performed by: PATHOLOGY

## 2023-07-10 PROCEDURE — 82570 ASSAY OF URINE CREATININE: CPT | Performed by: TRANSPLANT SURGERY

## 2023-07-10 PROCEDURE — 81001 URINALYSIS AUTO W/SCOPE: CPT | Performed by: PATHOLOGY

## 2023-09-20 ENCOUNTER — VIRTUAL VISIT (OUTPATIENT)
Dept: FAMILY MEDICINE | Facility: CLINIC | Age: 33
End: 2023-09-20
Payer: COMMERCIAL

## 2023-09-20 ENCOUNTER — NURSE TRIAGE (OUTPATIENT)
Dept: FAMILY MEDICINE | Facility: CLINIC | Age: 33
End: 2023-09-20

## 2023-09-20 DIAGNOSIS — U07.1 CLINICAL DIAGNOSIS OF COVID-19: ICD-10-CM

## 2023-09-20 DIAGNOSIS — R05.1 ACUTE COUGH: Primary | ICD-10-CM

## 2023-09-20 DIAGNOSIS — U07.1 INFECTION DUE TO 2019 NOVEL CORONAVIRUS: ICD-10-CM

## 2023-09-20 PROCEDURE — 99213 OFFICE O/P EST LOW 20 MIN: CPT | Mod: VID | Performed by: FAMILY MEDICINE

## 2023-09-20 NOTE — PROGRESS NOTES
"Lizzeth is a 33 year old who is being evaluated via a billable video visit.      How would you like to obtain your AVS? MyChart  If the video visit is dropped, the invitation should be resent by: Send to e-mail at: [email or web address]  Will anyone else be joining your video visit? No          Assessment & Plan     Infection due to 2019 novel coronavirus    - nirmatrelvir and ritonavir (PAXLOVID) 300 mg/100 mg therapy pack; Take 3 tablets by mouth 2 times daily for 5 days (Take 2 Nirmatrelvir tablets and 1 Ritonavir tablet twice daily for 5 days)    Acute cough    - nirmatrelvir and ritonavir (PAXLOVID) 300 mg/100 mg therapy pack; Take 3 tablets by mouth 2 times daily for 5 days (Take 2 Nirmatrelvir tablets and 1 Ritonavir tablet twice daily for 5 days)    Clinical diagnosis of COVID-19    - nirmatrelvir and ritonavir (PAXLOVID) 300 mg/100 mg therapy pack; Take 3 tablets by mouth 2 times daily for 5 days (Take 2 Nirmatrelvir tablets and 1 Ritonavir tablet twice daily for 5 days)             COVID-19 positive patient.  Encounter for consideration of medication intervention. Patient does qualify for a prescription. Full discussion with patient including medication options, risks and benefits. Potential drug interactions reviewed with patient.     Treatment Planned Paxlovid, Rx sent to [facility]    Temporary change to home medications:  None     Estimated body mass index is 27 kg/m  as calculated from the following:    Height as of 5/26/23: 1.702 m (5' 7\").    Weight as of 5/26/23: 78.2 kg (172 lb 6.4 oz).  GFR Estimate   Date Value Ref Range Status   07/10/2023 68 >60 mL/min/1.73m2 Final   07/22/2019 >90 >60 mL/min/[1.73_m2] Final     Comment:     Non  GFR Calc  Starting 12/18/2018, serum creatinine based estimated GFR (eGFR) will be   calculated using the Chronic Kidney Disease Epidemiology Collaboration   (CKD-EPI) equation.       Lab Results   Component Value Date    [id number] " Negative 01/02/2023       Sayra Vasquez MD  [facility]    Brien Moreau is a 33 year old, presenting for the following health issues:  Suspected Covid      9/20/2023    10:03 AM   Additional Questions   Roomed by Minnie BURROUGHS       History of Present Illness       Reason for visit:  COVID  Symptom onset:  1-3 days ago  Symptoms include:  Sore throat, cough, congestion, muscle aches, fatigue  Symptom intensity:  Moderate  Symptom progression:  Staying the same  Had these symptoms before:  No    She eats 2-3 servings of fruits and vegetables daily.She consumes 1 sweetened beverage(s) daily.She exercises with enough effort to increase her heart rate 20 to 29 minutes per day.  She exercises with enough effort to increase her heart rate 3 or less days per week.   She is taking medications regularly.     Started to have symptoms Monday  Tested positive yesterday afternoon  Fevers are maybe mild - better with antipyretics    Donated kidney in January        COVID-19 Symptom Review  How many days ago did these symptoms start? 3 days     Are any of the following symptoms significant for you?  New or worsening difficulty breathing? Yes  Please describe what kind of difficulty you are having breathing:Mild dyspnea (able to do ADLs without difficulty, mild shortness of breath with activities such as climbing one or two flights of stairs or walking briskly)  Worsening cough? Yes, it's a dry cough.   Fever or chills? Yes, I felt feverish or had chills. Has no thermometer  Headache: No  Sore throat: YES  Chest pain: No  Diarrhea: No  Body aches? YES    What treatments has patient tried? Acetaminophen not helpful  Does patient live in a nursing home, group home, or shelter? No  Does patient have a way to get food/medications during quarantined? Yes, I have a friend or family member who can help me.                Review of Systems   Constitutional, HEENT, cardiovascular, pulmonary, gi and gu systems  are negative, except as otherwise noted.      Objective           Vitals:  No vitals were obtained today due to virtual visit.    Physical Exam   GENERAL: Healthy, alert and no distress  EYES: Eyes grossly normal to inspection.  No discharge or erythema, or obvious scleral/conjunctival abnormalities.  RESP: No audible wheeze, cough, or visible cyanosis.  No visible retractions or increased work of breathing.    SKIN: Visible skin clear. No significant rash, abnormal pigmentation or lesions.  NEURO: Cranial nerves grossly intact.  Mentation and speech appropriate for age.  PSYCH: Mentation appears normal, affect normal/bright, judgement and insight intact, normal speech and appearance well-groomed.                Video-Visit Details    Type of service:  Video Visit   Joined the call at 9/20/2023, 10:36:30 am.  Left the call at 9/20/2023, 10:48:22 am.  You were on the call for 11 minutes 52 seconds  Originating Location (pt. Location): Home    Distant Location (provider location):  On-site  Platform used for Video Visit: Danny

## 2023-09-20 NOTE — TELEPHONE ENCOUNTER
RN COVID TREATMENT VISIT  09/20/23      The patient has been triaged and does not require a higher level of care.    Lizzeth Alarcon  33 year old  Current weight? 172lb    Has the patient been seen by a primary care provider at an [facility] or [facility] within the past two years? Yes.   Have you been in close proximity to/do you have a known exposure to a person with a confirmed case of influenza? No.     General treatment eligibility:  Date of positive COVID test (PCR or at home)?  9/19/2023    Are you or have you been hospitalized for this COVID-19 infection? No.   Have you received monoclonal antibodies or antiviral treatment for COVID-19 since this positive test? No.   Do you have any of the following conditions that place you at risk of being very sick from COVID-19?   - Overweight or Obesity (BMI >85th percentile or BMI 25 or higher)  Yes, patient has at least one high risk condition as noted above.     Current COVID symptoms:   - cough  - fatigue  - muscle or body aches  - sore throat  - congestion or runny nose  Yes. Patient has at least one symptom as selected.     How many days since symptoms started? 5 days or less. Established patient, 12 years or older weighing at least 88.2 lbs, who has symptoms that started in the past 5 days, has not been hospitalized nor received treatment already, and is at risk for being very sick from COVID-19.     Treatment eligibility by RN:  Are you currently pregnant or nursing? No  Do you have a clinically significant hypersensitivity to nirmatrelvir or ritonavir, or toxic epidermal necrolysis (TEN) or Holloway-Adelso Syndrome? No  Do you have a history of hepatitis, any hepatic impairment on the Problem List (such as Child-Guzman Class C, cirrhosis, fatty liver disease, alcoholic liver disease), or was the last liver lab (hepatic panel, ALT, AST, ALK Phos, bilirubin) elevated in the past 6 months? No  Do you have any history of severe renal  impairment (eGFR < 30mL/min)? No    Is patient eligible to continue? Yes, patient meets all eligibility requirements for the RN COVID treatment (as denoted by all no responses above).     Current Outpatient Medications   Medication Sig Dispense Refill    SUMAtriptan (IMITREX) 25 MG tablet Take 1 tablet (25 mg) by mouth at onset of headache for migraine May repeat in 2 hours. Max 4 tablets/24 hours. 30 tablet 0       Medications from List 1 of the standing order (on medications that exclude the use of Paxlovid) that patient is taking:  Is patient taking Los's Wort? No  Is patient taking Los's Wort or any meds from List 1? No.   Medications from List 2 of the standing order (on meds that provider needs to adjust) that patient is taking: NONE. Is patient on any of the meds from List 2? No.   Medications from List 3 of standing order (on meds that a RN needs to adjust) that patient is taking: NONE. Is patient on any meds from List 3? No.     Paxlovid has an approximate 90% reduction in hospitalization. Paxlovid can possibly cause altered sense of taste, diarrhea (loose, watery stools), high blood pressure, muscle aches.     Would patient like a Paxlovid prescription?   No. Patient informed there are no other treatment options at this time. Patient very concern of Paxlovid use since having one kidney. Pt wants to discuss this concern with the provider if there is any other alternative option for Covid treatment. Thomas Yousif RN.         Reason for Disposition   COVID-19 diagnosed by positive lab test (e.g., PCR, rapid self-test kit) and mild symptoms (e.g., cough, fever, others) and no complications or SOB    Additional Information   Negative: SEVERE difficulty breathing (e.g., struggling for each breath, speaks in single words)   Negative: Difficult to awaken or acting confused (e.g., disoriented, slurred speech)   Negative: Bluish (or gray) lips or face now   Negative: Shock suspected (e.g.,  cold/pale/clammy skin, too weak to stand, low BP, rapid pulse)   Negative: Sounds like a life-threatening emergency to the triager   Negative: SEVERE or constant chest pain or pressure  (Exception: Mild central chest pain, present only when coughing.)   Negative: MODERATE difficulty breathing (e.g., speaks in phrases, SOB even at rest, pulse 100-120)   Negative: Headache and stiff neck (can't touch chin to chest)   Negative: Oxygen level (e.g., pulse oximetry) 90% or lower   Negative: Diagnosed or suspected COVID-19 and symptoms lasting 3 or more weeks   Negative: COVID-19 exposure and no symptoms   Negative: COVID-19 vaccine reaction suspected (e.g., fever, headache, muscle aches) occurring 1 to 3 days after getting vaccine   Negative: COVID-19 vaccine, questions about   Negative: Lives with someone known to have influenza (flu test positive) and flu-like symptoms (e.g., cough, runny nose, sore throat, SOB; with or without fever)   Negative: Possible COVID-19 symptoms and triager concerned about severity of symptoms or other causes   Negative: COVID-19 and breastfeeding, questions about   Negative: Chest pain or pressure  (Exception: MILD central chest pain, present only when coughing.)   Negative: Drinking very little and dehydration suspected (e.g., no urine > 12 hours, very dry mouth, very lightheaded)   Negative: Patient sounds very sick or weak to the triager   Negative: MILD difficulty breathing (e.g., minimal/no SOB at rest, SOB with walking, pulse <100)   Negative: Fever > 103 F (39.4 C)   Negative: Fever > 101 F (38.3 C) and over 60 years of age   Negative: Fever > 100.0 F (37.8 C) and bedridden (e.g., CVA, chronic illness, recovering from surgery)   Negative: HIGH RISK patient (e.g., weak immune system, age > 64 years, obesity with BMI of 30 or higher, pregnant, chronic lung disease or other chronic medical condition) and COVID symptoms (e.g., cough, fever)  (Exceptions: Already seen by doctor or NP/PA  and no new or worsening symptoms.)   Negative: HIGH RISK patient and influenza is widespread in the community and ONE OR MORE respiratory symptoms: cough, sore throat, runny or stuffy nose   Negative: HIGH RISK patient and influenza exposure within the last 7 days and ONE OR MORE respiratory symptoms: cough, sore throat, runny or stuffy nose   Negative: Oxygen level (e.g., pulse oximetry) 91 to 94%   Negative: COVID-19 infection suspected by caller or triager and mild symptoms (cough, fever, or others) and negative COVID-19 rapid test   Negative: Fever present > 3 days (72 hours)   Negative: Fever returns after gone for over 24 hours and symptoms worse or not improved   Negative: Continuous (nonstop) coughing interferes with work or school and no improvement using cough treatment per Care Advice   Negative: Cough present > 3 weeks    Protocols used: Coronavirus (COVID-19) Diagnosed or [id number]

## 2023-10-13 ENCOUNTER — IMMUNIZATION (OUTPATIENT)
Dept: FAMILY MEDICINE | Facility: CLINIC | Age: 33
End: 2023-10-13
Payer: COMMERCIAL

## 2023-10-13 VITALS — TEMPERATURE: 97.7 F

## 2023-10-13 DIAGNOSIS — Z23 ENCOUNTER FOR IMMUNIZATION: Primary | ICD-10-CM

## 2023-10-13 PROCEDURE — 90686 IIV4 VACC NO PRSV 0.5 ML IM: CPT

## 2023-10-13 PROCEDURE — 90471 IMMUNIZATION ADMIN: CPT

## 2023-10-13 PROCEDURE — 99207 PR NO CHARGE NURSE ONLY: CPT

## 2023-10-13 NOTE — PROGRESS NOTES
Prior to immunization administration, verified patients identity using patient s name and date of birth. Please see Immunization Activity for additional information.     Screening Questionnaire for Adult Immunization    Are you sick today?   No   Do you have allergies to medications, food, a vaccine component or latex?   No   Have you ever had a serious reaction after receiving a vaccination?   No   Do you have a long-term health problem with heart, lung, kidney, or metabolic disease (e.g., diabetes), asthma, a blood disorder, no spleen, complement component deficiency, a cochlear implant, or a spinal fluid leak?  Are you on long-term aspirin therapy?   No   Do you have cancer, leukemia, HIV/AIDS, or any other immune system problem?   No   Do you have a parent, brother, or sister with an immune system problem?   No   In the past 3 months, have you taken medications that affect  your immune system, such as prednisone, other steroids, or anticancer drugs; drugs for the treatment of rheumatoid arthritis, Crohn s disease, or psoriasis; or have you had radiation treatments?   No   Have you had a seizure, or a brain or other nervous system problem?   No   During the past year, have you received a transfusion of blood or blood    products, or been given immune (gamma) globulin or antiviral drug?   No   For women: Are you pregnant or is there a chance you could become       pregnant during the next month?   No   Have you received any vaccinations in the past 4 weeks?   No     Immunization questionnaire answers were all negative.    I have reviewed the following standing orders:   This patient is due and qualifies for the Influenza vaccine.    Click here for Influenza Vaccine Standing Order    I have reviewed the vaccines inclusion and exclusion criteria; No concerns regarding eligibility.     RN reviewed injectable influenza screening questions with Pt - no concerns. Pt given VIS form. Patient instructed to remain in clinic  for 15 minutes afterwards, and to report any adverse reactions.     Performed by Evaristo De La Cruz RN on 10/13/2023 at 10:02 AM.

## 2023-12-28 ENCOUNTER — LAB (OUTPATIENT)
Dept: LAB | Facility: CLINIC | Age: 33
End: 2023-12-28
Payer: COMMERCIAL

## 2023-12-28 DIAGNOSIS — Z52.4 KIDNEY DONOR: ICD-10-CM

## 2023-12-28 LAB
ALBUMIN MFR UR ELPH: <6 MG/DL
ALBUMIN UR-MCNC: NEGATIVE MG/DL
APPEARANCE UR: CLEAR
BILIRUB UR QL STRIP: NEGATIVE
COLOR UR AUTO: NORMAL
CREAT SERPL-MCNC: 1.21 MG/DL (ref 0.51–0.95)
CREAT UR-MCNC: 31 MG/DL
CREAT UR-MCNC: 31.5 MG/DL
EGFRCR SERPLBLD CKD-EPI 2021: 60 ML/MIN/1.73M2
GLUCOSE UR STRIP-MCNC: NEGATIVE MG/DL
HGB UR QL STRIP: NEGATIVE
KETONES UR STRIP-MCNC: NEGATIVE MG/DL
LEUKOCYTE ESTERASE UR QL STRIP: NEGATIVE
MICROALBUMIN UR-MCNC: <12 MG/L
MICROALBUMIN/CREAT UR: NORMAL MG/G{CREAT}
NITRATE UR QL: NEGATIVE
PH UR STRIP: 6 [PH] (ref 5–7)
PROT/CREAT 24H UR: NORMAL MG/G{CREAT}
RBC URINE: <1 /HPF
SP GR UR STRIP: 1.01 (ref 1–1.03)
SQUAMOUS EPITHELIAL: 1 /HPF
TRANSITIONAL EPI: <1 /HPF
UROBILINOGEN UR STRIP-MCNC: NORMAL MG/DL
WBC URINE: 0 /HPF

## 2023-12-28 PROCEDURE — 82565 ASSAY OF CREATININE: CPT | Performed by: PATHOLOGY

## 2023-12-28 PROCEDURE — 99000 SPECIMEN HANDLING OFFICE-LAB: CPT | Performed by: PATHOLOGY

## 2023-12-28 PROCEDURE — 82043 UR ALBUMIN QUANTITATIVE: CPT | Performed by: TRANSPLANT SURGERY

## 2023-12-28 PROCEDURE — 84156 ASSAY OF PROTEIN URINE: CPT | Performed by: PATHOLOGY

## 2023-12-28 PROCEDURE — 81001 URINALYSIS AUTO W/SCOPE: CPT | Performed by: PATHOLOGY

## 2023-12-28 PROCEDURE — 36415 COLL VENOUS BLD VENIPUNCTURE: CPT | Performed by: PATHOLOGY

## 2024-02-16 DIAGNOSIS — Z52.4 KIDNEY DONOR: Primary | ICD-10-CM

## 2024-02-21 ENCOUNTER — OFFICE VISIT (OUTPATIENT)
Dept: FAMILY MEDICINE | Facility: CLINIC | Age: 34
End: 2024-02-21
Payer: COMMERCIAL

## 2024-02-21 VITALS
TEMPERATURE: 97.7 F | HEART RATE: 88 BPM | RESPIRATION RATE: 16 BRPM | BODY MASS INDEX: 28.25 KG/M2 | OXYGEN SATURATION: 98 % | DIASTOLIC BLOOD PRESSURE: 71 MMHG | WEIGHT: 180 LBS | HEIGHT: 67 IN | SYSTOLIC BLOOD PRESSURE: 123 MMHG

## 2024-02-21 DIAGNOSIS — I73.00 RAYNAUD'S DISEASE WITHOUT GANGRENE: Primary | ICD-10-CM

## 2024-02-21 DIAGNOSIS — R76.8 ELEVATED RHEUMATOID FACTOR: ICD-10-CM

## 2024-02-21 DIAGNOSIS — R79.89 ELEVATED SERUM CREATININE: ICD-10-CM

## 2024-02-21 LAB
ALBUMIN SERPL BCG-MCNC: 4.6 G/DL (ref 3.5–5.2)
ALP SERPL-CCNC: 60 U/L (ref 40–150)
ALT SERPL W P-5'-P-CCNC: 13 U/L (ref 0–50)
ANION GAP SERPL CALCULATED.3IONS-SCNC: 12 MMOL/L (ref 7–15)
AST SERPL W P-5'-P-CCNC: 24 U/L (ref 0–45)
BILIRUB SERPL-MCNC: 0.2 MG/DL
BUN SERPL-MCNC: 12.1 MG/DL (ref 6–20)
CALCIUM SERPL-MCNC: 9.7 MG/DL (ref 8.6–10)
CHLORIDE SERPL-SCNC: 102 MMOL/L (ref 98–107)
CREAT SERPL-MCNC: 1.24 MG/DL (ref 0.51–0.95)
DEPRECATED HCO3 PLAS-SCNC: 25 MMOL/L (ref 22–29)
EGFRCR SERPLBLD CKD-EPI 2021: 59 ML/MIN/1.73M2
ERYTHROCYTE [SEDIMENTATION RATE] IN BLOOD BY WESTERGREN METHOD: 16 MM/HR (ref 0–20)
GLUCOSE SERPL-MCNC: 78 MG/DL (ref 70–99)
POTASSIUM SERPL-SCNC: 4.1 MMOL/L (ref 3.4–5.3)
PROT SERPL-MCNC: 7.3 G/DL (ref 6.4–8.3)
RHEUMATOID FACT SERPL-ACNC: 24 IU/ML
SODIUM SERPL-SCNC: 139 MMOL/L (ref 135–145)
TSH SERPL DL<=0.005 MIU/L-ACNC: 1.72 UIU/ML (ref 0.3–4.2)

## 2024-02-21 PROCEDURE — 85652 RBC SED RATE AUTOMATED: CPT | Performed by: FAMILY MEDICINE

## 2024-02-21 PROCEDURE — 36415 COLL VENOUS BLD VENIPUNCTURE: CPT | Performed by: FAMILY MEDICINE

## 2024-02-21 PROCEDURE — 86038 ANTINUCLEAR ANTIBODIES: CPT | Performed by: FAMILY MEDICINE

## 2024-02-21 PROCEDURE — 84443 ASSAY THYROID STIM HORMONE: CPT | Performed by: FAMILY MEDICINE

## 2024-02-21 PROCEDURE — 86431 RHEUMATOID FACTOR QUANT: CPT | Performed by: FAMILY MEDICINE

## 2024-02-21 PROCEDURE — 80053 COMPREHEN METABOLIC PANEL: CPT | Performed by: FAMILY MEDICINE

## 2024-02-21 PROCEDURE — 99214 OFFICE O/P EST MOD 30 MIN: CPT | Performed by: FAMILY MEDICINE

## 2024-02-21 RX ORDER — SPIRONOLACTONE 100 MG/1
TABLET, FILM COATED ORAL
COMMUNITY
Start: 2023-08-01

## 2024-02-21 ASSESSMENT — PAIN SCALES - GENERAL: PAINLEVEL: NO PAIN (0)

## 2024-02-21 NOTE — PROGRESS NOTES
"  Assessment & Plan     Raynaud's disease without gangrene  We discussed symptomatic measures like wearing warm gloves , warm layer of undershirts and clothes to prevent the finger blanching , keeping the core body temperature warm too   We also discussed medication like nifedipine or amlodipine if the above measures do not help   - TSH with free T4 reflex; Future  - Anti Nuclear Sylvia IgG by IFA with Reflex; Future  - Rheumatoid factor; Future  - ESR: Erythrocyte sedimentation rate; Future  - Comprehensive metabolic panel (BMP + Alb, Alk Phos, ALT, AST, Total. Bili, TP); Future  - TSH with free T4 reflex  - Anti Nuclear Sylvia IgG by IFA with Reflex  - Rheumatoid factor  - ESR: Erythrocyte sedimentation rate  - Comprehensive metabolic panel (BMP + Alb, Alk Phos, ALT, AST, Total. Bili, TP)  - Adult Rheumatology  Referral; Future    Elevated rheumatoid factor  Elevated RF , plus the Raynaud's , I have sent a [employer] message to the pt about the referral   - Adult Rheumatology  Referral; Future    Elevated serum creatinine  Status post kidney donation January of 2023   - Adult Rheumatology  Referral; Future      RTC if no improving or worsening.  Pt is aware  and comfortable with the current plan.        BMI  Estimated body mass index is 28.19 kg/m  as calculated from the following:    Height as of this encounter: 1.702 m (5' 7\").    Weight as of this encounter: 81.6 kg (180 lb).             Subjective   Lizzeth is a 33 year old, presenting for the following health issues:  Consult      2/21/2024     8:02 AM   Additional Questions   Roomed by serenity lozano     History of Present Illness       Reason for visit:  Raynauds syndrome  Symptom onset:  More than a month  Symptoms include:  Fingers turn white in cold, some toe numbness  Symptom intensity:  Moderate  Symptom progression:  Worsening  Had these symptoms before:  Yes  Has tried/received treatment for these symptoms:  No    She eats 2-3 servings " of fruits and vegetables daily.She consumes 2 sweetened beverage(s) daily.She exercises with enough effort to increase her heart rate 20 to 29 minutes per day.  She exercises with enough effort to increase her heart rate 4 days per week.   She is taking medications regularly.   Worse symptoms now with  blanching of the fingers both hands , has pictures , usually when cold outside   Status post kidney donation January of 2023 ,   FH of autoimmune no , mom had thyroid issue           Review of Systems  Constitutional, HEENT, cardiovascular, pulmonary, GI, , musculoskeletal, neuro, skin, endocrine and psych systems are negative, except as otherwise noted.      Objective    There were no vitals taken for this visit.  There is no height or weight on file to calculate BMI.  Physical Exam   GENERAL: alert and no distress  EYES: Eyes grossly normal to inspection, PERRL and conjunctivae and sclerae normal  NECK: no adenopathy, no asymmetry, masses, or scars  RESP: lungs clear to auscultation - no rales, rhonchi or wheezes  CV: regular rate and rhythm, normal S1 S2, no S3 or S4, no murmur, click or rub, no peripheral edema  ABDOMEN: soft, nontender, no hepatosplenomegaly, no masses and bowel sounds normal  MS: no gross musculoskeletal defects noted, no edema  NEURO: Normal strength and tone, mentation intact and speech normal    Results for orders placed or performed in visit on 02/21/24   TSH with free T4 reflex     Status: Normal   Result Value Ref Range    TSH 1.72 0.30 - 4.20 uIU/mL   Rheumatoid factor     Status: Abnormal   Result Value Ref Range    Rheumatoid Factor 24 (H) <14 IU/mL   ESR: Erythrocyte sedimentation rate     Status: Normal   Result Value Ref Range    Erythrocyte Sedimentation Rate 16 0 - 20 mm/hr   Comprehensive metabolic panel (BMP + Alb, Alk Phos, ALT, AST, Total. Bili, TP)     Status: Abnormal   Result Value Ref Range    Sodium 139 135 - 145 mmol/L    Potassium 4.1 3.4 - 5.3 mmol/L    Carbon Dioxide  (CO2) 25 22 - 29 mmol/L    Anion Gap 12 7 - 15 mmol/L    Urea Nitrogen 12.1 6.0 - 20.0 mg/dL    Creatinine 1.24 (H) 0.51 - 0.95 mg/dL    GFR Estimate 59 (L) >60 mL/min/1.73m2    Calcium 9.7 8.6 - 10.0 mg/dL    Chloride 102 98 - 107 mmol/L    Glucose 78 70 - 99 mg/dL    Alkaline Phosphatase 60 40 - 150 U/L    AST 24 0 - 45 U/L    ALT 13 0 - 50 U/L    Protein Total 7.3 6.4 - 8.3 g/dL    Albumin 4.6 3.5 - 5.2 g/dL    Bilirubin Total 0.2 <=1.2 mg/dL           Signed Electronically by: Rita Blum MD

## 2024-02-22 PROBLEM — I73.00 RAYNAUD'S DISEASE WITHOUT GANGRENE: Status: ACTIVE | Noted: 2024-02-22

## 2024-02-23 LAB
ANA PAT SER IF-IMP: ABNORMAL
ANA SER QL IF: POSITIVE
ANA TITR SER IF: ABNORMAL {TITER}

## 2024-04-26 ENCOUNTER — PATIENT OUTREACH (OUTPATIENT)
Dept: CARE COORDINATION | Facility: CLINIC | Age: 34
End: 2024-04-26
Payer: COMMERCIAL

## 2024-05-10 ENCOUNTER — PATIENT OUTREACH (OUTPATIENT)
Dept: CARE COORDINATION | Facility: CLINIC | Age: 34
End: 2024-05-10
Payer: COMMERCIAL

## 2024-06-26 NOTE — PROGRESS NOTES
NEW PATIENT RHEUMATOLOGY VISIT     Assessment & Plan     Problem List    Solitary kidney status post kidney donation    Raynaud's  EUGENIA 1:1280 Centromere   RF +24  Comment: Patient seen today for evaluation of worsening Raynaud's and fatigability (unable to keep up with previous physical activities as well as possible increase in muscle soreness). 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  Review of systems is positive for intermittent GERD and intermittent arthralgias over her hands and elbows, but is otherwise reassuring.  No rashes, muscle weakness, synovitis, telangiectasias, calcinosis, abnormal nailfold capillaries, digital pits or sclerodactyly on exam.     She reports a history of a lupus-like reaction to Accutane in high school, and a maternal grandmother with rheumatoid arthritis, which further supports the predisposition towards autoimmunity.    Plan:  -Reviewed conservative management for Raynaud's and discussed possibility of starting a calcium channel blocker if symptoms continue to progress.  -Had extensive conversation with patient today regarding my suspicion for underlying autoimmune disease.  Reviewed signs and symptoms to be aware of and watch out for including worsening GERD, worsening Raynaud's, changes in breathing, fevers, new cough, and skin changes.  -Given suspicion for scleroderma and patient solitary kidney also counseled her to avoid steroids for possible risk of scleroderma renal crisis.  -PFTs, echocardiogram, and labs as below to assess for underlying autoimmune disease including lupus, scleroderma, myositis, Sjogren's, rheumatoid arthritis.  Infectious labs also ordered in preparation for possible DMARD.       Solitary kidney  Elevated creatinine  Comment: Donated a kidney in January 2024 and since then has had elevated  creatinine; peak 1.56 with improvement to 1.10 but now is appears to hover around 1.2.  No proteinuria or hematuria.  Plan:  -Sent message to patient's transplant doctor to get a feel for whether this persistent elevation of creatinine is to be expected or if she warrants further workup.    Orders Placed This Encounter   Procedures    CK total    Scleroderma Antibody Scl70 BRYAN IgG    [facility]; 2001601 (Laboratory Miscellaneous Order)    Centromere Antibody IgG    PM Scl 100 Ab IgG with Reflex to EUGENIA IFA    [facility]; 2012173; Fibrillarin (U3 RNP) Antibody, IgG (Laboratory Miscellaneous Order)    [facility]; [id number]; Anti-Th/To Ab (RDL) (Laboratory Miscellaneous Order)    HIV Antigen Antibody Combo Cascade    Hepatitis B core antibody    Hepatitis B surface antigen    Hepatitis C antibody    Complement C3    Complement C4    Erythrocyte sedimentation rate auto    CRP inflammation    BRYAN antibody panel    DNA double stranded antibodies    Beta 2 Glycoprotein Antibodies IGG IGM    Cardiolipin Sylvia IgG and IgM    Lupus Anticoagulant Panel    UA with Microscopic reflex to Culture    Protein  random urine    Comprehensive metabolic panel    CBC with platelets and differential    UA Microscopic with Reflex to Culture    2012173; Fibrillarin (U3 RNP) Antibody, IgG: [facility]iscellaneous Test    2001601: [facility]iscellaneous Test    [facility] Miscellaneous Testing    Echocardiogram Complete    General PFT Lab (Please always keep checked)    Pulmonary Function Test        The longitudinal plan of care for the diagnosis(es)/condition(s) as documented were addressed during this visit. Due to the added complexity in care, I will continue to support Lizzeth in the subsequent management and with ongoing continuity of care.    60 minutes spent on the day of the encounter doing chart review, history and exam, counseling and documentation.     RTC after above work-up is complete     Subjective         HPI    Has had Raynaud's  since teenage years, worse last year with pain on re-warming and numbness/tingling. Takes about 10 minutes to rewarm, sometimes toes are affected too. Denies history of finger tip or toe sores. 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   Goes on long walks, but they tire her out more easily than prior. Has noticed this over the last 6 months.  No SOB or new cough, no chest pain,     No hair loss,   no rashes, no photosensitivity    no oral or nasal ulcers, no hx of inflammatory eye disease,   no fevers or weigh loss, no hx of blood clots, never been pregnant,  no dry eyes or dry mouth,     PMhx  JIn high school was put on Acutane and developed Lupus like symptoms with major fatigue, joint pain,   an of 2023 donated a kidney   No other hospitalizations   Migraines     Family/Social  Maternal grandmother with rheumatoid arthritis   Not a previous smoker. Not a current smoker.   Works in [employer]       Lab and Imaging review:    I reviewed recent labs and imaging including:  RF 24   EUGENIA 1:1280 Centromere       Current Outpatient Medications:     spironolactone (ALDACTONE) 100 MG tablet, , Disp: , Rfl:     SUMAtriptan (IMITREX) 25 MG tablet, Take 1 tablet (25 mg) by mouth at onset of headache for migraine May repeat in 2 hours. Max 4 tablets/24 hours., Disp: 30 tablet, Rfl: 0  Allergies:  Allergies   Allergen Reactions    Minocycline      Joint pains     Medical Hx:  Past Medical History:    Diagnosis Date    Acne     Closed fracture of olecranon process of ulna 04/2006    left olecranon fracture    Contraceptive management 2008    Exercise-induced asthma     Migraine headache      Surgical Hx:  Past Surgical History:   Procedure Laterality Date    LAPAROSCOPIC DONOR HAND ASSISTED KIDNEY NON-DIRECTED Left 1/3/2023    Procedure: Laparoscopic Hand Assisted Living Non-Directed Left Kidney Donor;  Surgeon: Arron Moran MD;  Location: [facility]    [facility] Bilateral     WISDOM TOOTH EXTRACTION       Family Hx:  Family History   Problem Relation Age of Onset    Thyroid Disease Mother     Depression Mother     Mental Illness Mother     Asthma Father     Hypertension Sister     Respiratory Brother         Exercised induced Asthma    Breast Cancer Other     Colon Cancer No family hx of     Diabetes No family hx of     Myocardial Infarction No family hx of     Melanoma No family hx of     Skin Cancer No family hx of     Kidney Disease No family hx of      Social Hx:  Social History     Tobacco Use    Smoking status: Never     Passive exposure: Never    Smokeless tobacco: Never    Tobacco comments:     no second hand smoke   Vaping Use    Vaping status: Never Used   Substance Use Topics    Alcohol use: Yes     Alcohol/week: 5.0 - 7.0 standard drinks of alcohol     Types: 5 - 7 Standard drinks or equivalent per week    Drug use: No        Objective   Physical Exam   /79 (BP Location: Right arm, Patient Position: Sitting, Cuff Size: Adult Regular)   Pulse 78   Wt 85.3 kg (188 lb)   SpO2 100%   BMI 29.44 kg/m    General: alert, well appearing, no distress  HEENT: no alopecia, clear conjunctiva, no oral or nasal ulcers, no cervical lymphadenopathy, no mucosal or facial telangiectasias  Cardiac: normal rate and rhythm, no murmur, rubs or gallops   Pulm: normal respiratory effort, clear to auscultation bilaterally   MSK: Hand, wrist, elbow, and shoulder joints without evidence of synovitis or effusion.  Intact and nonpainful range of motion.   Muscle strength exam:  Should abduction (deltoids): 5/5 bilaterally   Elbow flexion (biceps): 5/5 bilaterally   Elbow extension (triceps): 5/5 bilaterally  Hip flexion (Iliopsoas): 5/5 bilaterally   Knee extension (Quadriceps): 5/5 bilaterally   Knee flexion (Hamstrings): 5/5 bilaterally     Skin: Normal nailfold capillaries per jeweler's loop.  Keloid over chest and a few over her back, warm and well-perfused. No nail pitting, digital ulceration, no telangiectasias over chest, face, back. No subcutaneous nodules.       Celine Castro MD  Rheumatology

## 2024-06-28 ENCOUNTER — LAB (OUTPATIENT)
Dept: LAB | Facility: CLINIC | Age: 34
End: 2024-06-28
Payer: COMMERCIAL

## 2024-06-28 ENCOUNTER — OFFICE VISIT (OUTPATIENT)
Dept: RHEUMATOLOGY | Facility: CLINIC | Age: 34
End: 2024-06-28
Attending: FAMILY MEDICINE
Payer: COMMERCIAL

## 2024-06-28 VITALS
OXYGEN SATURATION: 100 % | SYSTOLIC BLOOD PRESSURE: 111 MMHG | DIASTOLIC BLOOD PRESSURE: 79 MMHG | HEART RATE: 78 BPM | BODY MASS INDEX: 29.44 KG/M2 | WEIGHT: 188 LBS

## 2024-06-28 DIAGNOSIS — R76.8 ELEVATED RHEUMATOID FACTOR: ICD-10-CM

## 2024-06-28 DIAGNOSIS — R76.8 CENTROMERE ANTIBODY POSITIVE: Primary | ICD-10-CM

## 2024-06-28 DIAGNOSIS — Z13.220 SCREENING FOR HYPERLIPIDEMIA: Primary | ICD-10-CM

## 2024-06-28 DIAGNOSIS — R79.89 ELEVATED SERUM CREATININE: ICD-10-CM

## 2024-06-28 DIAGNOSIS — I73.00 RAYNAUD'S DISEASE WITHOUT GANGRENE: ICD-10-CM

## 2024-06-28 LAB
ALBUMIN MFR UR ELPH: <6 MG/DL
ALBUMIN SERPL BCG-MCNC: 4.7 G/DL (ref 3.5–5.2)
ALBUMIN UR-MCNC: NEGATIVE MG/DL
ALP SERPL-CCNC: 63 U/L (ref 40–150)
ALT SERPL W P-5'-P-CCNC: 17 U/L (ref 0–50)
ANION GAP SERPL CALCULATED.3IONS-SCNC: 10 MMOL/L (ref 7–15)
APPEARANCE UR: CLEAR
AST SERPL W P-5'-P-CCNC: 28 U/L (ref 0–45)
BACTERIA #/AREA URNS HPF: ABNORMAL /HPF
BASOPHILS # BLD AUTO: 0 10E3/UL (ref 0–0.2)
BASOPHILS NFR BLD AUTO: 1 %
BILIRUB SERPL-MCNC: 0.3 MG/DL
BILIRUB UR QL STRIP: NEGATIVE
BUN SERPL-MCNC: 11.6 MG/DL (ref 6–20)
CALCIUM SERPL-MCNC: 9.7 MG/DL (ref 8.6–10)
CHLORIDE SERPL-SCNC: 101 MMOL/L (ref 98–107)
CHOLEST SERPL-MCNC: 221 MG/DL
CK SERPL-CCNC: 65 U/L (ref 26–192)
COLOR UR AUTO: YELLOW
CREAT SERPL-MCNC: 1.16 MG/DL (ref 0.51–0.95)
CREAT UR-MCNC: 20 MG/DL
CRP SERPL-MCNC: <3 MG/L
DEPRECATED HCO3 PLAS-SCNC: 27 MMOL/L (ref 22–29)
EGFRCR SERPLBLD CKD-EPI 2021: 63 ML/MIN/1.73M2
EOSINOPHIL # BLD AUTO: 0.4 10E3/UL (ref 0–0.7)
EOSINOPHIL NFR BLD AUTO: 6 %
ERYTHROCYTE [DISTWIDTH] IN BLOOD BY AUTOMATED COUNT: 12.5 % (ref 10–15)
ERYTHROCYTE [SEDIMENTATION RATE] IN BLOOD BY WESTERGREN METHOD: 11 MM/HR (ref 0–20)
FASTING STATUS PATIENT QL REPORTED: NO
FASTING STATUS PATIENT QL REPORTED: NO
GLUCOSE SERPL-MCNC: 83 MG/DL (ref 70–99)
GLUCOSE UR STRIP-MCNC: NEGATIVE MG/DL
HBV CORE AB SERPL QL IA: NONREACTIVE
HBV SURFACE AG SERPL QL IA: NONREACTIVE
HCT VFR BLD AUTO: 39.7 % (ref 35–47)
HCV AB SERPL QL IA: NONREACTIVE
HDLC SERPL-MCNC: 62 MG/DL
HGB BLD-MCNC: 13.1 G/DL (ref 11.7–15.7)
HGB UR QL STRIP: NEGATIVE
HIV 1+2 AB+HIV1 P24 AG SERPL QL IA: NONREACTIVE
IMM GRANULOCYTES # BLD: 0 10E3/UL
IMM GRANULOCYTES NFR BLD: 0 %
KETONES UR STRIP-MCNC: NEGATIVE MG/DL
LDLC SERPL CALC-MCNC: 143 MG/DL
LEUKOCYTE ESTERASE UR QL STRIP: ABNORMAL
LYMPHOCYTES # BLD AUTO: 1.3 10E3/UL (ref 0.8–5.3)
LYMPHOCYTES NFR BLD AUTO: 22 %
Lab: NORMAL
MCH RBC QN AUTO: 31.3 PG (ref 26.5–33)
MCHC RBC AUTO-ENTMCNC: 33 G/DL (ref 31.5–36.5)
MCV RBC AUTO: 95 FL (ref 78–100)
MONOCYTES # BLD AUTO: 0.6 10E3/UL (ref 0–1.3)
MONOCYTES NFR BLD AUTO: 9 %
NEUTROPHILS # BLD AUTO: 3.6 10E3/UL (ref 1.6–8.3)
NEUTROPHILS NFR BLD AUTO: 62 %
NITRATE UR QL: NEGATIVE
NONHDLC SERPL-MCNC: 159 MG/DL
PERFORMING LABORATORY: NORMAL
PH UR STRIP: 6.5 [PH] (ref 5–7)
PLATELET # BLD AUTO: 266 10E3/UL (ref 150–450)
POTASSIUM SERPL-SCNC: 4.3 MMOL/L (ref 3.4–5.3)
PROT SERPL-MCNC: 7.7 G/DL (ref 6.4–8.3)
PROT/CREAT 24H UR: NORMAL MG/G{CREAT}
RBC # BLD AUTO: 4.18 10E6/UL (ref 3.8–5.2)
RBC #/AREA URNS AUTO: ABNORMAL /HPF
SODIUM SERPL-SCNC: 138 MMOL/L (ref 135–145)
SP GR UR STRIP: <=1.005 (ref 1–1.03)
SPECIMEN STATUS: NORMAL
SQUAMOUS #/AREA URNS AUTO: ABNORMAL /LPF
TEST NAME: NORMAL
TRIGL SERPL-MCNC: 79 MG/DL
UROBILINOGEN UR STRIP-ACNC: 0.2 E.U./DL
WBC # BLD AUTO: 5.8 10E3/UL (ref 4–11)
WBC #/AREA URNS AUTO: ABNORMAL /HPF

## 2024-06-28 PROCEDURE — 36415 COLL VENOUS BLD VENIPUNCTURE: CPT | Performed by: STUDENT IN AN ORGANIZED HEALTH CARE EDUCATION/TRAINING PROGRAM

## 2024-06-28 PROCEDURE — 86235 NUCLEAR ANTIGEN ANTIBODY: CPT | Performed by: STUDENT IN AN ORGANIZED HEALTH CARE EDUCATION/TRAINING PROGRAM

## 2024-06-28 PROCEDURE — 99213 OFFICE O/P EST LOW 20 MIN: CPT | Performed by: STUDENT IN AN ORGANIZED HEALTH CARE EDUCATION/TRAINING PROGRAM

## 2024-06-28 PROCEDURE — 86704 HEP B CORE ANTIBODY TOTAL: CPT | Performed by: STUDENT IN AN ORGANIZED HEALTH CARE EDUCATION/TRAINING PROGRAM

## 2024-06-28 PROCEDURE — 86200 CCP ANTIBODY: CPT | Performed by: STUDENT IN AN ORGANIZED HEALTH CARE EDUCATION/TRAINING PROGRAM

## 2024-06-28 PROCEDURE — 87340 HEPATITIS B SURFACE AG IA: CPT | Performed by: STUDENT IN AN ORGANIZED HEALTH CARE EDUCATION/TRAINING PROGRAM

## 2024-06-28 PROCEDURE — 84156 ASSAY OF PROTEIN URINE: CPT | Performed by: STUDENT IN AN ORGANIZED HEALTH CARE EDUCATION/TRAINING PROGRAM

## 2024-06-28 PROCEDURE — 86803 HEPATITIS C AB TEST: CPT | Performed by: STUDENT IN AN ORGANIZED HEALTH CARE EDUCATION/TRAINING PROGRAM

## 2024-06-28 PROCEDURE — 86481 TB AG RESPONSE T-CELL SUSP: CPT | Performed by: STUDENT IN AN ORGANIZED HEALTH CARE EDUCATION/TRAINING PROGRAM

## 2024-06-28 PROCEDURE — 86146 BETA-2 GLYCOPROTEIN ANTIBODY: CPT | Performed by: STUDENT IN AN ORGANIZED HEALTH CARE EDUCATION/TRAINING PROGRAM

## 2024-06-28 PROCEDURE — 85390 FIBRINOLYSINS SCREEN I&R: CPT | Mod: 26 | Performed by: PATHOLOGY

## 2024-06-28 PROCEDURE — 86140 C-REACTIVE PROTEIN: CPT | Performed by: STUDENT IN AN ORGANIZED HEALTH CARE EDUCATION/TRAINING PROGRAM

## 2024-06-28 PROCEDURE — 86225 DNA ANTIBODY NATIVE: CPT | Performed by: STUDENT IN AN ORGANIZED HEALTH CARE EDUCATION/TRAINING PROGRAM

## 2024-06-28 PROCEDURE — 86147 CARDIOLIPIN ANTIBODY EA IG: CPT | Performed by: STUDENT IN AN ORGANIZED HEALTH CARE EDUCATION/TRAINING PROGRAM

## 2024-06-28 PROCEDURE — 86160 COMPLEMENT ANTIGEN: CPT | Performed by: STUDENT IN AN ORGANIZED HEALTH CARE EDUCATION/TRAINING PROGRAM

## 2024-06-28 PROCEDURE — 81001 URINALYSIS AUTO W/SCOPE: CPT | Performed by: STUDENT IN AN ORGANIZED HEALTH CARE EDUCATION/TRAINING PROGRAM

## 2024-06-28 PROCEDURE — 87389 HIV-1 AG W/HIV-1&-2 AB AG IA: CPT | Performed by: STUDENT IN AN ORGANIZED HEALTH CARE EDUCATION/TRAINING PROGRAM

## 2024-06-28 PROCEDURE — 85652 RBC SED RATE AUTOMATED: CPT | Performed by: STUDENT IN AN ORGANIZED HEALTH CARE EDUCATION/TRAINING PROGRAM

## 2024-06-28 PROCEDURE — G2211 COMPLEX E/M VISIT ADD ON: HCPCS | Performed by: STUDENT IN AN ORGANIZED HEALTH CARE EDUCATION/TRAINING PROGRAM

## 2024-06-28 PROCEDURE — 99205 OFFICE O/P NEW HI 60 MIN: CPT | Performed by: STUDENT IN AN ORGANIZED HEALTH CARE EDUCATION/TRAINING PROGRAM

## 2024-06-28 PROCEDURE — 80061 LIPID PANEL: CPT

## 2024-06-28 PROCEDURE — 85730 THROMBOPLASTIN TIME PARTIAL: CPT | Performed by: STUDENT IN AN ORGANIZED HEALTH CARE EDUCATION/TRAINING PROGRAM

## 2024-06-28 PROCEDURE — 80053 COMPREHEN METABOLIC PANEL: CPT | Performed by: STUDENT IN AN ORGANIZED HEALTH CARE EDUCATION/TRAINING PROGRAM

## 2024-06-28 PROCEDURE — 82550 ASSAY OF CK (CPK): CPT | Performed by: STUDENT IN AN ORGANIZED HEALTH CARE EDUCATION/TRAINING PROGRAM

## 2024-06-28 PROCEDURE — 85025 COMPLETE CBC W/AUTO DIFF WBC: CPT | Performed by: STUDENT IN AN ORGANIZED HEALTH CARE EDUCATION/TRAINING PROGRAM

## 2024-06-28 ASSESSMENT — PAIN SCALES - GENERAL: PAINLEVEL: NO PAIN (0)

## 2024-06-28 NOTE — LETTER
6/28/2024       RE: Lizzeth Alarcon  [address]  [facility] [address]     Dear Colleague,    Thank you for referring your patient, Lizzeth Alarcon, to the [facility] at [facility]. Please see a copy of my visit note below.    NEW PATIENT RHEUMATOLOGY VISIT     Assessment & Plan    Problem List    Solitary kidney status post kidney donation    Raynaud's  EUGENIA 1:1280 Centromere   RF +24  Comment: Patient seen today for evaluation of worsening Raynaud's and fatigability (unable to keep up with previous physical activities as well as possible increase in muscle soreness). 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  Review of systems is positive for intermittent GERD and intermittent arthralgias over her hands and elbows, but is otherwise reassuring.  No rashes, muscle weakness, synovitis, telangiectasias, calcinosis, abnormal nailfold capillaries, digital pits or sclerodactyly on exam.     She reports a history of a lupus-like reaction to Accutane in high school, and a maternal grandmother with rheumatoid arthritis, which further supports the predisposition towards autoimmunity.    Plan:  -Reviewed conservative management for Raynaud's and discussed possibility of starting a calcium channel blocker if symptoms continue to progress.  -Had extensive conversation with patient today regarding my suspicion for underlying autoimmune disease.  Reviewed signs and symptoms to be aware of and watch out for including worsening GERD, worsening Raynaud's, changes in breathing, fevers, new cough, and skin changes.  -Given suspicion for scleroderma and patient solitary kidney also counseled her to avoid steroids for possible risk of scleroderma renal crisis.  -PFTs, echocardiogram, and labs as  below to assess for underlying autoimmune disease including lupus, scleroderma, myositis, Sjogren's, rheumatoid arthritis.  Infectious labs also ordered in preparation for possible DMARD.       Solitary kidney  Elevated creatinine  Comment: Donated a kidney in January 2024 and since then has had elevated creatinine; peak 1.56 with improvement to 1.10 but now is appears to hover around 1.2.  No proteinuria or hematuria.  Plan:  -Sent message to patient's transplant doctor to get a feel for whether this persistent elevation of creatinine is to be expected or if she warrants further workup.    Orders Placed This Encounter   Procedures    CK total    Scleroderma Antibody Scl70 BRYAN IgG    [facility]; 2001601 (Laboratory Miscellaneous Order)    Centromere Antibody IgG    PM Scl 100 Ab IgG with Reflex to EUGENIA IFA    [facility]; 2012173; Fibrillarin (U3 RNP) Antibody, IgG (Laboratory Miscellaneous Order)    [facility]; [id number]; Anti-Th/To Ab (RDL) (Laboratory Miscellaneous Order)    HIV Antigen Antibody Combo Cascade    Hepatitis B core antibody    Hepatitis B surface antigen    Hepatitis C antibody    Complement C3    Complement C4    Erythrocyte sedimentation rate auto    CRP inflammation    BRYAN antibody panel    DNA double stranded antibodies    Beta 2 Glycoprotein Antibodies IGG IGM    Cardiolipin Sylvia IgG and IgM    Lupus Anticoagulant Panel    UA with Microscopic reflex to Culture    Protein  random urine    Comprehensive metabolic panel    CBC with platelets and differential    UA Microscopic with Reflex to Culture    2012173; Fibrillarin (U3 RNP) Antibody, IgG: [facility]    2001601: [facility]iscellaneous Test    [facility] Miscellaneous Testing    Echocardiogram Complete    General PFT Lab (Please always keep checked)    Pulmonary Function Test        The longitudinal plan of care for the diagnosis(es)/condition(s) as documented were addressed during this visit. Due to the added complexity in care, I  will continue to support Lizzeth in the subsequent management and with ongoing continuity of care.    60 minutes spent on the day of the encounter doing chart review, history and exam, counseling and documentation.     RTC after above work-up is complete     Subjective        HPI    Has had Raynaud's since teenage years, worse last year with pain on re-warming and numbness/tingling. Takes about 10 minutes to rewarm, sometimes toes are affected too. Denies history of finger tip or toe sores. 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   Goes on long walks, but they tire her out more easily than prior. Has noticed this over the last 6 months.  No SOB or new cough, no chest pain,     No hair loss,   no rashes, no photosensitivity    no oral or nasal ulcers, no hx of inflammatory eye disease,   no fevers or weigh loss, no hx of blood clots, never been pregnant,  no dry eyes or dry mouth,     PMhx  JIn high school was put on Acutane and developed Lupus like symptoms with major fatigue, joint pain,   an of 2023 donated a kidney   No other hospitalizations   Migraines     Family/Social  Maternal grandmother with rheumatoid arthritis   Not a previous smoker. Not a current smoker.   Works in [employer]       Lab and Imaging review:    I reviewed recent labs and imaging including:  RF 24   EUGENIA 1:1280 Centromere       Current Outpatient Medications:     spironolactone (ALDACTONE) 100 MG tablet, , Disp:  , Rfl:     SUMAtriptan (IMITREX) 25 MG tablet, Take 1 tablet (25 mg) by mouth at onset of headache for migraine May repeat in 2 hours. Max 4 tablets/24 hours., Disp: 30 tablet, Rfl: 0  Allergies:  Allergies   Allergen Reactions    Minocycline      Joint pains     Medical Hx:  Past Medical History:   Diagnosis Date    Acne     Closed fracture of olecranon process of ulna 04/2006    left olecranon fracture    Contraceptive management 2008    Exercise-induced asthma     Migraine headache      Surgical Hx:  Past Surgical History:   Procedure Laterality Date    LAPAROSCOPIC DONOR HAND ASSISTED KIDNEY NON-DIRECTED Left 1/3/2023    Procedure: Laparoscopic Hand Assisted Living Non-Directed Left Kidney Donor;  Surgeon: Arron Moran MD;  Location: [facility] OR    LASIK Bilateral     WISDOM TOOTH EXTRACTION       Family Hx:  Family History   Problem Relation Age of Onset    Thyroid Disease Mother     Depression Mother     Mental Illness Mother     Asthma Father     Hypertension Sister     Respiratory Brother         Exercised induced Asthma    Breast Cancer Other     Colon Cancer No family hx of     Diabetes No family hx of     Myocardial Infarction No family hx of     Melanoma No family hx of     Skin Cancer No family hx of     Kidney Disease No family hx of      Social Hx:  Social History     Tobacco Use    Smoking status: Never     Passive exposure: Never    Smokeless tobacco: Never    Tobacco comments:     no second hand smoke   Vaping Use    Vaping status: Never Used   Substance Use Topics    Alcohol use: Yes     Alcohol/week: 5.0 - 7.0 standard drinks of alcohol     Types: 5 - 7 Standard drinks or equivalent per week    Drug use: No        Objective  Physical Exam   /79 (BP Location: Right arm, Patient Position: Sitting, Cuff Size: Adult Regular)   Pulse 78   Wt 85.3 kg (188 lb)   SpO2 100%   BMI 29.44 kg/m    General: alert, well appearing, no distress  HEENT: no alopecia, clear conjunctiva, no oral or nasal  ulcers, no cervical lymphadenopathy, no mucosal or facial telangiectasias  Cardiac: normal rate and rhythm, no murmur, rubs or gallops   Pulm: normal respiratory effort, clear to auscultation bilaterally   MSK: Hand, wrist, elbow, and shoulder joints without evidence of synovitis or effusion. Intact and nonpainful range of motion.   Muscle strength exam:  Should abduction (deltoids): 5/5 bilaterally   Elbow flexion (biceps): 5/5 bilaterally   Elbow extension (triceps): 5/5 bilaterally  Hip flexion (Iliopsoas): 5/5 bilaterally   Knee extension (Quadriceps): 5/5 bilaterally   Knee flexion (Hamstrings): 5/5 bilaterally     Skin: Normal nailfold capillaries per jeweler's loop.  Keloid over chest and a few over her back, warm and well-perfused. No nail pitting, digital ulceration, no telangiectasias over chest, face, back. No subcutaneous nodules.       Celine Castro MD  Rheumatology

## 2024-06-28 NOTE — NURSING NOTE
Chief Complaint   Patient presents with    Consult     referred by Dr. Rita Blum, elevated RF and Raynauds, scheduled per pt     /79 (BP Location: Right arm, Patient Position: Sitting, Cuff Size: Adult Regular)   Pulse 78   Wt 85.3 kg (188 lb)   SpO2 100%   BMI 29.44 kg/m    Salena Castle [facility]

## 2024-06-30 LAB
MISCELLANEOUS TEST 1 (ARUP): NORMAL
QUANTIFERON MITOGEN: 10 IU/ML
QUANTIFERON NIL TUBE: 0.03 IU/ML
QUANTIFERON TB1 TUBE: 0.04 IU/ML
QUANTIFERON TB2 TUBE: 0.07

## 2024-07-01 LAB
B2 GLYCOPROT1 IGG SERPL IA-ACNC: 0.9 U/ML
B2 GLYCOPROT1 IGM SERPL IA-ACNC: 12 U/ML
C3 SERPL-MCNC: 141 MG/DL (ref 81–157)
C4 SERPL-MCNC: 24 MG/DL (ref 13–39)
CARDIOLIPIN IGG SER IA-ACNC: <2 GPL-U/ML
CARDIOLIPIN IGG SER IA-ACNC: NEGATIVE
CARDIOLIPIN IGM SER IA-ACNC: <2 MPL-U/ML
CARDIOLIPIN IGM SER IA-ACNC: NEGATIVE
CCP AB SER IA-ACNC: 0.8 U/ML
DRVVT SCREEN RATIO: 0.9
DSDNA AB SER-ACNC: 3.1 IU/ML
ENA SM IGG SER IA-ACNC: <0.7 U/ML
ENA SM IGG SER IA-ACNC: NEGATIVE
ENA SS-A AB SER IA-ACNC: <0.5 U/ML
ENA SS-A AB SER IA-ACNC: NEGATIVE
ENA SS-B IGG SER IA-ACNC: <0.6 U/ML
ENA SS-B IGG SER IA-ACNC: NEGATIVE
GAMMA INTERFERON BACKGROUND BLD IA-ACNC: 0.03 IU/ML
INR PPP: 1.05 (ref 0.85–1.15)
LA PPP-IMP: NEGATIVE
LUPUS INTERPRETATION: NORMAL
M TB IFN-G BLD-IMP: NEGATIVE
M TB IFN-G CD4+ BCKGRND COR BLD-ACNC: 9.97 IU/ML
MISCELLANEOUS TEST 1 (ARUP): NORMAL
MITOGEN IGNF BCKGRD COR BLD-ACNC: 0.01 IU/ML
MITOGEN IGNF BCKGRD COR BLD-ACNC: 0.04 IU/ML
PTT RATIO: 1.19
THROMBIN TIME: 17.1 SECONDS (ref 13–19)
U1 SNRNP IGG SER IA-ACNC: <1.1 U/ML
U1 SNRNP IGG SER IA-ACNC: NEGATIVE

## 2024-07-02 LAB
CENTROMERE B AB SER-ACNC: >240 U/ML
CENTROMERE B AB SER-ACNC: POSITIVE
ENA SCL70 IGG SER IA-ACNC: 1.2 U/ML
ENA SCL70 IGG SER IA-ACNC: NEGATIVE
PM/SCL-100 AB SER QL LINE BLOT: NEGATIVE

## 2024-07-03 ENCOUNTER — OFFICE VISIT (OUTPATIENT)
Dept: PULMONOLOGY | Facility: CLINIC | Age: 34
End: 2024-07-03
Attending: STUDENT IN AN ORGANIZED HEALTH CARE EDUCATION/TRAINING PROGRAM
Payer: COMMERCIAL

## 2024-07-03 DIAGNOSIS — I73.00 RAYNAUD'S DISEASE WITHOUT GANGRENE: Primary | ICD-10-CM

## 2024-07-03 DIAGNOSIS — R76.8 CENTROMERE ANTIBODY POSITIVE: ICD-10-CM

## 2024-07-03 LAB
DLCOCOR-%PRED-PRE: 106 %
DLCOCOR-PRE: 25.56 ML/MIN/MMHG
DLCOUNC-%PRED-PRE: 105 %
DLCOUNC-PRE: 25.32 ML/MIN/MMHG
DLCOUNC-PRED: 23.98 ML/MIN/MMHG
ERV-%PRED-PRE: 99 %
ERV-PRE: 1.38 L
ERV-PRED: 1.39 L
EXPTIME-PRE: 6.64 SEC
FEF2575-%PRED-PRE: 84 %
FEF2575-PRE: 2.96 L/SEC
FEF2575-PRED: 3.51 L/SEC
FEFMAX-%PRED-PRE: 93 %
FEFMAX-PRE: 7.09 L/SEC
FEFMAX-PRED: 7.61 L/SEC
FEV1-%PRED-PRE: 95 %
FEV1-PRE: 3.14 L
FEV1FEV6-PRE: 80 %
FEV1FEV6-PRED: 85 %
FEV1FVC-PRE: 80 %
FEV1FVC-PRED: 84 %
FEV1SVC-PRE: 80 %
FEV1SVC-PRED: 83 %
FIFMAX-PRE: 3.79 L/SEC
FRCPLETH-%PRED-PRE: 100 %
FRCPLETH-PRE: 2.89 L
FRCPLETH-PRED: 2.89 L
FVC-%PRED-PRE: 99 %
FVC-PRE: 3.92 L
FVC-PRED: 3.94 L
IC-%PRED-PRE: 91 %
IC-PRE: 2.54 L
IC-PRED: 2.79 L
RVPLETH-%PRED-PRE: 90 %
RVPLETH-PRE: 1.51 L
RVPLETH-PRED: 1.66 L
TLCPLETH-%PRED-PRE: 97 %
TLCPLETH-PRE: 5.43 L
TLCPLETH-PRED: 5.57 L
VA-%PRED-PRE: 90 %
VA-PRE: 4.98 L
VC-%PRED-PRE: 97 %
VC-PRE: 3.92 L
VC-PRED: 4 L

## 2024-07-03 PROCEDURE — 94729 DIFFUSING CAPACITY: CPT | Performed by: INTERNAL MEDICINE

## 2024-07-03 PROCEDURE — 94726 PLETHYSMOGRAPHY LUNG VOLUMES: CPT | Performed by: INTERNAL MEDICINE

## 2024-07-03 PROCEDURE — 94375 RESPIRATORY FLOW VOLUME LOOP: CPT | Performed by: INTERNAL MEDICINE

## 2024-07-03 NOTE — PROGRESS NOTES
Lizzeth Alarcon comes into clinic today at the request of Dr. Celine De La Cruz Ordering Provider for PFT      Estuardo Gaona, RRT

## 2024-07-17 LAB — LABCORP INTERFACED MISCELLANEOUS TEST RESULT: NORMAL

## 2024-07-18 ENCOUNTER — ANCILLARY PROCEDURE (OUTPATIENT)
Dept: CARDIOLOGY | Facility: CLINIC | Age: 34
End: 2024-07-18
Attending: STUDENT IN AN ORGANIZED HEALTH CARE EDUCATION/TRAINING PROGRAM
Payer: COMMERCIAL

## 2024-07-18 LAB — LVEF ECHO: NORMAL

## 2024-07-18 PROCEDURE — 93306 TTE W/DOPPLER COMPLETE: CPT | Performed by: INTERNAL MEDICINE

## 2024-07-24 NOTE — PROGRESS NOTES
Virtual Visit Details    Type of service:  Video Visit     Originating Location (pt. Location): Home    Distant Location (provider location):  On-site  Platform used for Video Visit: [facility]    RETURN PATIENT RHEUMATOLOGY VISIT     Assessment & Plan     Problem List    Solitary kidney status post kidney donation    Limited scleroderma sine scleroderma    Comment: Clinical manifestations of worsening Raynaud's, GERD, intermittent arthralgias and fatigue.  No sclerodactyly, telangiectasias, calcinosis, abnormal nailfold capillaries, or muscle weakness.  Serologically with positive EUGENIA 1: 1280 and +Centromere >240.     Positive centromere placer increased risk of pulmonary hypertension.  She does not have any respiratory symptoms at this time.  And PFTs and echocardiogram are within normal limits.     She reports a history of a lupus-like reaction to Accutane in high school, and a maternal grandmother with rheumatoid arthritis, which further supports the predisposition towards autoimmunity.    Plan:  -Reviewed conservative management for Raynaud's and discussed possibility of starting a calcium channel blocker if symptoms continue to progress.  -Given suspicion for scleroderma and patient solitary kidney also counseled her to avoid high dose steroids for possible risk of scleroderma renal crisis.  -Will continue to follow her and monitor for signs and symptoms of progressive scleroderma.  -Screening PFTs and echocardiogram every 6 months or with clinical changes  7/2024 normal spirometry   6/2024 normal echocardiogram     Solitary kidney  Elevated creatinine  Comment: Donated a kidney in January 2024 and since then has had elevated creatinine; peak 1.56 with improvement to 1.10 but now is appears to hover around 1.2.  No proteinuria or hematuria.  Plan:  -Sent message to patient's transplant doctor to get a feel for whether this persistent elevation of creatinine is to be expected or if she warrants further workup, but  did not hear back. Will send to nephrology to ensure we are optimizing her from a renal standpoint.     Orders Placed This Encounter   Procedures    Adult Nephrology  Referral        The longitudinal plan of care for the diagnosis(es)/condition(s) as documented were addressed during this visit. Due to the added complexity in care, I will continue to support Lizzeth in the subsequent management and with ongoing continuity of care.    40 minutes spent on the day of the encounter doing chart review, history and exam, counseling and documentation.     RTC in 2-3 months     Subjective     Gets flares of joint pain over her wrists and elbows and sometimes ankles without swelling about a couple times a month which last for about an afternoon or so then self-resolves. No hand swelling. Raynaud's is now affecting her toes when previously this would only happen in the winter. +GERD which has been getting worse over the last few months. No skin thickening. No numbness or tingling outside of raynaud's episodes. No rashes. No chest pain. No SOB or new cough. No LE swelling. No bloating or constipation. No weight loss. Continued fatigue -stable.     HPI    Has had Raynaud's since teenage years, worse last year with pain on re-warming and numbness/tingling. Takes about 10 minutes to rewarm, sometimes toes are affected too. Denies history of finger tip or toe sores. 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   Goes on long walks, but they tire her out more easily than prior. Has noticed this over the last 6 months.  No SOB or new cough, no chest pain,     No hair loss,   no rashes, no photosensitivity    no oral or nasal ulcers, no hx of inflammatory eye disease,   no fevers or weigh loss, no hx of blood clots, never been pregnant,  no dry eyes or dry mouth,     PMhx  JIn high school was put on Acutane and developed Lupus like symptoms with major fatigue, joint pain,   an of 2023 donated a kidney   No other hospitalizations   Migraines     Family/Social  Maternal grandmother with rheumatoid arthritis   Not a previous smoker. Not a current smoker.   Works in [employer]       Lab and Imaging review:    I reviewed recent labs and imaging including:  RF 24   EUGENIA 1:1280 Centromere     +Centromere >240     6/2024 neg/normal UA, urine protein, CCP, RNApol3, Scl-70, U3 RNP, U1RNP, Th/To, PM/Scl-100, De La Cruz, SSA, SSB, crp, esr, C3, C4, CK    Neg quant gold, Hep B and C, HIV       Current Outpatient Medications:     omeprazole (PRILOSEC) 20 MG DR capsule, Take 1 capsule (20 mg) by mouth daily, Disp: 90 capsule, Rfl: 1    spironolactone (ALDACTONE) 100 MG tablet, , Disp: , Rfl:     SUMAtriptan (IMITREX) 25 MG tablet, Take 1 tablet (25 mg) by mouth at onset of headache for migraine May repeat in 2 hours. Max 4 tablets/24 hours., Disp: 30 tablet, Rfl: 0  Allergies:  Allergies   Allergen Reactions    Minocycline      Joint pains     Medical Hx:  Past Medical History:   Diagnosis Date    Acne     Closed fracture of olecranon process of ulna 04/2006    left olecranon fracture    Contraceptive management 2008    Exercise-induced asthma     Migraine headache      Surgical Hx:  Past Surgical History:   Procedure Laterality Date    LAPAROSCOPIC DONOR HAND ASSISTED KIDNEY NON-DIRECTED Left 1/3/2023    Procedure: Laparoscopic Hand Assisted Living Non-Directed Left Kidney Donor;  Surgeon: Arron Moran MD;  Location:  OR     LASIK Bilateral     WISDOM TOOTH EXTRACTION       Family Hx:  Family History   Problem Relation Age of Onset    Thyroid Disease Mother     Depression Mother     Mental Illness Mother     Asthma Father     Hypertension Sister     Respiratory Brother         Exercised induced Asthma    Breast Cancer Other     Colon Cancer No family hx of     Diabetes No family hx of     Myocardial Infarction No family hx of     Melanoma No family hx of     Skin Cancer No family hx of     Kidney Disease No family hx of      Social Hx:  Social History     Tobacco Use    Smoking status: Never     Passive exposure: Never    Smokeless tobacco: Never    Tobacco comments:     no second hand smoke   Vaping Use    Vaping status: Never Used   Substance Use Topics    Alcohol use: Yes     Alcohol/week: 5.0 - 7.0 standard drinks of alcohol     Types: 5 - 7 Standard drinks or equivalent per week    Drug use: No        Objective   Physical Exam     Limited exam due to telehealth.  Mentating appropriately.  Breathing well on room air.    6/28/24 exam   Muscle strength exam:  Should abduction (deltoids): 5/5 bilaterally   Elbow flexion (biceps): 5/5 bilaterally   Elbow extension (triceps): 5/5 bilaterally  Hip flexion (Iliopsoas): 5/5 bilaterally   Knee extension (Quadriceps): 5/5 bilaterally   Knee flexion (Hamstrings): 5/5 bilaterally     Skin: Normal nailfold capillaries per jeweler's loop.  Keloid over chest and a few over her back, warm and well-perfused. No nail pitting, digital ulceration, no telangiectasias over chest, face, back. No subcutaneous nodules.       Celine Castro MD  Rheumatology

## 2024-07-25 ENCOUNTER — VIRTUAL VISIT (OUTPATIENT)
Dept: RHEUMATOLOGY | Facility: CLINIC | Age: 34
End: 2024-07-25
Payer: COMMERCIAL

## 2024-07-25 VITALS — BODY MASS INDEX: 28.25 KG/M2 | HEIGHT: 67 IN | WEIGHT: 180 LBS

## 2024-07-25 DIAGNOSIS — K21.00 GASTROESOPHAGEAL REFLUX DISEASE WITH ESOPHAGITIS WITHOUT HEMORRHAGE: ICD-10-CM

## 2024-07-25 DIAGNOSIS — N18.1 STAGE 1 CHRONIC KIDNEY DISEASE: ICD-10-CM

## 2024-07-25 DIAGNOSIS — Z52.4 KIDNEY DONOR: Primary | ICD-10-CM

## 2024-07-25 PROCEDURE — G2211 COMPLEX E/M VISIT ADD ON: HCPCS | Performed by: STUDENT IN AN ORGANIZED HEALTH CARE EDUCATION/TRAINING PROGRAM

## 2024-07-25 PROCEDURE — 99215 OFFICE O/P EST HI 40 MIN: CPT | Mod: 95 | Performed by: STUDENT IN AN ORGANIZED HEALTH CARE EDUCATION/TRAINING PROGRAM

## 2024-07-25 ASSESSMENT — PAIN SCALES - GENERAL: PAINLEVEL: NO PAIN (0)

## 2024-07-25 NOTE — NURSING NOTE
Current patient location: [address] [place] [address] [address]  [facility] [id number]    Is the patient currently in the state of [place]? YES    Visit mode:VIDEO    If the visit is dropped, the patient can be reconnected by: VIDEO VISIT: Text to cell phone:   Telephone Information:   Mobile [phone]    and VIDEO VISIT: Send to e-mail at: [email or web address]    Will anyone else be joining the visit? NO  (If patient encounters technical issues they should call [phone]    How would you like to obtain your AVS? MyChart    Are changes needed to the allergy or medication list? No    Patient denies any changes since echeck-in completion and states all information entered during echeck-in remains accurate.    Are refills needed on medications prescribed by this physician? NO    No other vitals to report today    Reason for visit: NOVA SanchezF

## 2024-08-31 ENCOUNTER — HEALTH MAINTENANCE LETTER (OUTPATIENT)
Age: 34
End: 2024-08-31

## 2024-11-14 SDOH — HEALTH STABILITY: PHYSICAL HEALTH: ON AVERAGE, HOW MANY DAYS PER WEEK DO YOU ENGAGE IN MODERATE TO STRENUOUS EXERCISE (LIKE A BRISK WALK)?: 3 DAYS

## 2024-11-14 ASSESSMENT — SOCIAL DETERMINANTS OF HEALTH (SDOH): HOW OFTEN DO YOU GET TOGETHER WITH FRIENDS OR RELATIVES?: TWICE A WEEK

## 2024-11-19 ENCOUNTER — OFFICE VISIT (OUTPATIENT)
Dept: FAMILY MEDICINE | Facility: CLINIC | Age: 34
End: 2024-11-19
Payer: COMMERCIAL

## 2024-11-19 VITALS
OXYGEN SATURATION: 99 % | WEIGHT: 191.3 LBS | HEART RATE: 89 BPM | DIASTOLIC BLOOD PRESSURE: 70 MMHG | SYSTOLIC BLOOD PRESSURE: 101 MMHG | BODY MASS INDEX: 28.99 KG/M2 | RESPIRATION RATE: 16 BRPM | TEMPERATURE: 98.8 F | HEIGHT: 68 IN

## 2024-11-19 DIAGNOSIS — L70.9 ACNE, UNSPECIFIED ACNE TYPE: ICD-10-CM

## 2024-11-19 DIAGNOSIS — Z00.00 ENCOUNTER FOR ROUTINE ADULT HEALTH EXAMINATION WITHOUT ABNORMAL FINDINGS: Primary | ICD-10-CM

## 2024-11-19 PROCEDURE — 99395 PREV VISIT EST AGE 18-39: CPT | Performed by: FAMILY MEDICINE

## 2024-11-19 RX ORDER — SPIRONOLACTONE 100 MG/1
50 TABLET, FILM COATED ORAL DAILY
Status: CANCELLED | OUTPATIENT
Start: 2024-11-19

## 2024-11-19 RX ORDER — SPIRONOLACTONE 50 MG/1
50 TABLET, FILM COATED ORAL DAILY
Qty: 90 TABLET | Refills: 1 | Status: SHIPPED | OUTPATIENT
Start: 2024-11-19

## 2024-11-19 ASSESSMENT — PAIN SCALES - GENERAL: PAINLEVEL_OUTOF10: NO PAIN (0)

## 2024-11-19 NOTE — PROGRESS NOTES
"Preventive Care Visit  [facility]  Rita Blum MD, Family Medicine  Nov 19, 2024      Assessment & Plan     Encounter for routine adult health examination without abnormal findings  Discussed diet,calcium,exercise.Went over self breast exam.Thin prep was NOT done.Eyes and teeth UTD.No immunizations needed today.See orders below for tests ordered and screening needed.      Acne, unspecified acne type  She has been on the 100 mg but we discussed stepping down to the 50 mg , she will be seeing her rheumatologist soon and also is referred to see nephrology because of the elevated creatinine and she will schedule with them with the help of her transplant specialist ( she donated a  kidney in January of 2023 ) has been followed by them since then   - REVIEW OF HEALTH MAINTENANCE PROTOCOL ORDERS  - spironolactone (ALDACTONE) 50 MG tablet; Take 1 tablet (50 mg) by mouth daily.    Patient has been advised of split billing requirements and indicates understanding: Yes        BMI  Estimated body mass index is 29.43 kg/m  as calculated from the following:    Height as of this encounter: 1.717 m (5' 7.6\").    Weight as of this encounter: 86.8 kg (191 lb 4.8 oz).       Counseling  Appropriate preventive services were addressed with this patient via screening, questionnaire, or discussion as appropriate for fall prevention, nutrition, physical activity, Tobacco-use cessation, social engagement, weight loss and cognition.  Checklist reviewing preventive services available has been given to the patient.  Reviewed patient's diet, addressing concerns and/or questions.   She is at risk for lack of exercise and has been provided with information to increase physical activity for the benefit of her well-being.           Brien Moreau is a 34 year old, presenting for the following:  Physical        11/19/2024     7:14 AM   Additional Questions   Roomed by Rylee BURROUGHS   Accompanied by n/a          HPI  Q 6 " months labs by transplant team , she donated a kidney January of 2023   Rheumatology and will be seeing a nephrology in March   Spironolactone for acne has been on it for a while , takes it at 50 mg       Health Care Directive  Patient does not have a Health Care Directive:       11/14/2024   General Health   How would you rate your overall physical health? Good   Feel stress (tense, anxious, or unable to sleep) Only a little      (!) STRESS CONCERN      11/14/2024   Nutrition   Three or more servings of calcium each day? Yes   Diet: Regular (no restrictions)   How many servings of fruit and vegetables per day? (!) 2-3   How many sweetened beverages each day? 0-1            11/14/2024   Exercise   Days per week of moderate/strenous exercise 3 days            11/14/2024   Social Factors   Frequency of gathering with friends or relatives Twice a week   Worry food won't last until get money to buy more No   Food not last or not have enough money for food? No   Do you have housing? (Housing is defined as stable permanent housing and does not include staying ouside in a car, in a tent, in an abandoned building, in an overnight shelter, or couch-surfing.) Yes   Are you worried about losing your housing? No   Lack of transportation? No   Unable to get utilities (heat,electricity)? No            11/14/2024   Dental   Dentist two times every year? Yes                     11/14/2024   Substance Use   Alcohol more than 3/day or more than 7/wk No   Do you use any other substances recreationally? No        Social History     Tobacco Use    Smoking status: Never     Passive exposure: Never    Smokeless tobacco: Never    Tobacco comments:     no second hand smoke   Vaping Use    Vaping status: Never Used   Substance Use Topics    Alcohol use: Yes     Alcohol/week: 5.0 - 7.0 standard drinks of alcohol     Types: 5 - 7 Standard drinks or equivalent per week    Drug use: No          Mammogram Screening - Patient under 40 years of  age: Routine Mammogram Screening not recommended.         11/14/2024   STI Screening   New sexual partner(s) since last STI/HIV test? No        History of abnormal Pap smear: No - age 30-64 HPV with reflex Pap every 5 years recommended        Latest Ref Rng & Units 4/29/2021     8:39 AM 4/29/2021     8:31 AM 8/18/2017     8:03 AM   PAP / HPV   PAP (Historical)   NIL  NIL    HPV 16 DNA NEG^Negative Negative      HPV 18 DNA NEG^Negative Negative      Other HR HPV NEG^Negative Negative              11/14/2024   Contraception/Family Planning   Questions about contraception or family planning (!) YES            Reviewed and updated as needed this visit by Provider                    Past Medical History:   Diagnosis Date    Acne     Closed fracture of olecranon process of ulna 04/2006    left olecranon fracture    Contraceptive management 2008    Exercise-induced asthma     Migraine headache      Past Surgical History:   Procedure Laterality Date    LAPAROSCOPIC DONOR HAND ASSISTED KIDNEY NON-DIRECTED Left 1/3/2023    Procedure: Laparoscopic Hand Assisted Living Non-Directed Left Kidney Donor;  Surgeon: Arron Moran MD;  Location:  OR    [facility] Bilateral     WISDOM TOOTH EXTRACTION       Lab work is in process  Labs reviewed in EPIC  BP Readings from Last 3 Encounters:   11/19/24 101/70   06/28/24 111/79   02/21/24 123/71    Wt Readings from Last 3 Encounters:   11/19/24 86.8 kg (191 lb 4.8 oz)   07/25/24 81.6 kg (180 lb)   06/28/24 85.3 kg (188 lb)                  Patient Active Problem List   Diagnosis    Acne    Contraceptive management    Keloid scar    Contraception    BMI > 25    Migraine without aura and without status migrainosus, not intractable    Transplant donor evaluation    Kidney donor    Acute post-operative pain    Raynaud's disease without gangrene     Past Surgical History:   Procedure Laterality Date    LAPAROSCOPIC DONOR HAND ASSISTED KIDNEY NON-DIRECTED Left 1/3/2023    Procedure:  Laparoscopic Hand Assisted Living Non-Directed Left Kidney Donor;  Surgeon: Arron Moran MD;  Location: [facility] OR    LASIK Bilateral     WISDOM TOOTH EXTRACTION         Social History     Tobacco Use    Smoking status: Never     Passive exposure: Never    Smokeless tobacco: Never    Tobacco comments:     no second hand smoke   Substance Use Topics    Alcohol use: Yes     Alcohol/week: 5.0 - 7.0 standard drinks of alcohol     Types: 5 - 7 Standard drinks or equivalent per week     Family History   Problem Relation Age of Onset    Thyroid Disease Mother     Depression Mother     Mental Illness Mother     Asthma Father     Hypertension Sister     Respiratory Brother         Exercised induced Asthma    Breast Cancer Other     Colon Cancer No family hx of     Diabetes No family hx of     Myocardial Infarction No family hx of     Melanoma No family hx of     Skin Cancer No family hx of     Kidney Disease No family hx of          Current Outpatient Medications   Medication Sig Dispense Refill    omeprazole (PRILOSEC) 20 MG DR capsule Take 1 capsule (20 mg) by mouth daily 90 capsule 1    spironolactone (ALDACTONE) 100 MG tablet       spironolactone (ALDACTONE) 50 MG tablet Take 1 tablet (50 mg) by mouth daily. 90 tablet 1    SUMAtriptan (IMITREX) 25 MG tablet Take 1 tablet (25 mg) by mouth at onset of headache for migraine May repeat in 2 hours. Max 4 tablets/24 hours. 30 tablet 0     Allergies   Allergen Reactions    Minocycline      Joint pains     Recent Labs   Lab Test 06/28/24  1159 02/21/24  0834 12/19/22  1406 10/28/22  0732 06/02/22  0816 10/14/19  0951 07/22/19  1619   0000   A1C  --   --   --  5.2  --   --   --   --    *  --   --  145* 115*   < >  --   --    HDL 62  --   --  62 83   < >  --   --    TRIG 79  --   --  96 106   < >  --   --    ALT 17 13  --  32  --   --   --   --    CR 1.16* 1.24*   < > 0.94  --   --  0.74  --    GFRESTIMATED 63 59*   < > 82  --   --  >90  --    GFRESTBLACK  --   --   --    "--   --   --  >90  --    POTASSIUM 4.3 4.1  --  4.0  --   --  4.1   < >   TSH  --  1.72  --   --   --   --   --   --     < > = values in this interval not displayed.          Review of Systems  Constitutional, HEENT, cardiovascular, pulmonary, GI, , musculoskeletal, neuro, skin, endocrine and psych systems are negative, except as otherwise noted.     Objective    Exam  There were no vitals taken for this visit.   Estimated body mass index is 28.19 kg/m  as calculated from the following:    Height as of 7/25/24: 1.702 m (5' 7\").    Weight as of 7/25/24: 81.6 kg (180 lb).    Physical Exam  GENERAL: alert and no distress  EYES: Eyes grossly normal to inspection, PERRL and conjunctivae and sclerae normal  HENT: ear canals and TM's normal, nose and mouth without ulcers or lesions  NECK: no adenopathy, no asymmetry, masses, or scars  RESP: lungs clear to auscultation - no rales, rhonchi or wheezes  BREAST: normal without masses, tenderness or nipple discharge and no palpable axillary masses or adenopathy  CV: regular rate and rhythm, normal S1 S2, no S3 or S4, no murmur, click or rub, no peripheral edema  ABDOMEN: soft, nontender, no hepatosplenomegaly, no masses and bowel sounds normal  MS: no gross musculoskeletal defects noted, no edema  SKIN: no suspicious lesions or rashes  NEURO: Normal strength and tone, mentation intact and speech normal  PSYCH: mentation appears normal, affect normal/bright        Signed Electronically by: Rita Blum MD    "

## 2024-12-18 NOTE — CONFIDENTIAL NOTE
DIAGNOSIS:    Kidney donor   Stage 1 chronic kidney disease      DATE RECEIVED:  03.12.2025    NOTES STATUS DETAILS   OFFICE NOTE from referring provider Internal 07.25.2024  Celine Castro MD    MEDICATION LIST Internal    LABS     CBC Internal 06.28.2024   CMP Internal 06.28.2024   UA Internal 06.28.2024   URINE PROTEIN Internal 06.28.2024

## 2024-12-24 ENCOUNTER — LAB (OUTPATIENT)
Dept: LAB | Facility: CLINIC | Age: 34
End: 2024-12-24
Payer: COMMERCIAL

## 2024-12-24 DIAGNOSIS — Z52.4 KIDNEY DONOR: ICD-10-CM

## 2024-12-24 LAB
ALBUMIN MFR UR ELPH: <6 MG/DL
ALBUMIN UR-MCNC: NEGATIVE MG/DL
APPEARANCE UR: CLEAR
BILIRUB UR QL STRIP: NEGATIVE
COLOR UR AUTO: NORMAL
CREAT SERPL-MCNC: 1.12 MG/DL (ref 0.51–0.95)
CREAT UR-MCNC: 14.1 MG/DL
CREAT UR-MCNC: 14.2 MG/DL
EGFRCR SERPLBLD CKD-EPI 2021: 66 ML/MIN/1.73M2
GLUCOSE UR STRIP-MCNC: NEGATIVE MG/DL
HGB UR QL STRIP: NEGATIVE
KETONES UR STRIP-MCNC: NEGATIVE MG/DL
LEUKOCYTE ESTERASE UR QL STRIP: NEGATIVE
MICROALBUMIN UR-MCNC: <12 MG/L
MICROALBUMIN/CREAT UR: NORMAL MG/G{CREAT}
NITRATE UR QL: NEGATIVE
PH UR STRIP: 6.5 [PH] (ref 5–7)
PROT/CREAT 24H UR: NORMAL MG/G{CREAT}
RBC URINE: 1 /HPF
SP GR UR STRIP: 1 (ref 1–1.03)
SQUAMOUS EPITHELIAL: <1 /HPF
UROBILINOGEN UR STRIP-MCNC: NORMAL MG/DL
WBC URINE: <1 /HPF

## 2024-12-24 PROCEDURE — 82570 ASSAY OF URINE CREATININE: CPT | Performed by: SURGERY

## 2024-12-24 PROCEDURE — 82565 ASSAY OF CREATININE: CPT | Performed by: PATHOLOGY

## 2024-12-24 PROCEDURE — 85730 THROMBOPLASTIN TIME PARTIAL: CPT | Performed by: STUDENT IN AN ORGANIZED HEALTH CARE EDUCATION/TRAINING PROGRAM

## 2024-12-24 PROCEDURE — 99000 SPECIMEN HANDLING OFFICE-LAB: CPT | Performed by: PATHOLOGY

## 2024-12-24 PROCEDURE — 36415 COLL VENOUS BLD VENIPUNCTURE: CPT | Performed by: STUDENT IN AN ORGANIZED HEALTH CARE EDUCATION/TRAINING PROGRAM

## 2024-12-24 PROCEDURE — 81001 URINALYSIS AUTO W/SCOPE: CPT | Performed by: PATHOLOGY

## 2024-12-24 PROCEDURE — 85390 FIBRINOLYSINS SCREEN I&R: CPT | Mod: 26 | Performed by: PATHOLOGY

## 2024-12-24 PROCEDURE — 86147 CARDIOLIPIN ANTIBODY EA IG: CPT | Performed by: STUDENT IN AN ORGANIZED HEALTH CARE EDUCATION/TRAINING PROGRAM

## 2024-12-24 PROCEDURE — 86146 BETA-2 GLYCOPROTEIN ANTIBODY: CPT | Performed by: STUDENT IN AN ORGANIZED HEALTH CARE EDUCATION/TRAINING PROGRAM

## 2024-12-24 PROCEDURE — 84156 ASSAY OF PROTEIN URINE: CPT | Performed by: PATHOLOGY

## 2025-01-23 DIAGNOSIS — K21.00 GASTROESOPHAGEAL REFLUX DISEASE WITH ESOPHAGITIS WITHOUT HEMORRHAGE: ICD-10-CM

## 2025-03-05 DIAGNOSIS — Z52.4 KIDNEY DONOR: Primary | ICD-10-CM

## 2025-03-11 ENCOUNTER — PATIENT OUTREACH (OUTPATIENT)
Dept: NEPHROLOGY | Facility: CLINIC | Age: 35
End: 2025-03-11
Payer: COMMERCIAL

## 2025-03-11 NOTE — PROGRESS NOTES
Update from patient of availability to see Dr. Norman for April 28th [facility] clinic and available for labs on Tuesday 4/22/25.  Scheduled appt and replied via [employer] to call and schedule labs as [facility] lab is open all day every 15min.   Aura Chen RN, BSN, PHN   Care Coordinator  [phone]

## 2025-03-11 NOTE — PROGRESS NOTES
Voicemail left to support rescheduling to GN team r/t scleroderma renal crisis.  Updating scheduling notes to communicate rescheduling support.  Aura Chen RN, BSN, PHN  GN Care Coordinator  [phone]

## 2025-03-12 ENCOUNTER — PRE VISIT (OUTPATIENT)
Dept: NEPHROLOGY | Facility: CLINIC | Age: 35
End: 2025-03-12

## 2025-04-21 ENCOUNTER — LAB (OUTPATIENT)
Dept: LAB | Facility: CLINIC | Age: 35
End: 2025-04-21
Payer: COMMERCIAL

## 2025-04-21 DIAGNOSIS — Z52.4 KIDNEY DONOR: ICD-10-CM

## 2025-04-21 LAB
ALBUMIN MFR UR ELPH: <6 MG/DL
ALBUMIN SERPL BCG-MCNC: 4.5 G/DL (ref 3.5–5.2)
ALBUMIN UR-MCNC: NEGATIVE MG/DL
ANION GAP SERPL CALCULATED.3IONS-SCNC: 9 MMOL/L (ref 7–15)
APPEARANCE UR: CLEAR
BACTERIA #/AREA URNS HPF: ABNORMAL /HPF
BILIRUB UR QL STRIP: NEGATIVE
BUN SERPL-MCNC: 13.1 MG/DL (ref 6–20)
CALCIUM SERPL-MCNC: 9.7 MG/DL (ref 8.8–10.4)
CHLORIDE SERPL-SCNC: 101 MMOL/L (ref 98–107)
COLOR UR AUTO: ABNORMAL
CREAT SERPL-MCNC: 1.12 MG/DL (ref 0.51–0.95)
CREAT UR-MCNC: 20.3 MG/DL
EGFRCR SERPLBLD CKD-EPI 2021: 65 ML/MIN/1.73M2
ERYTHROCYTE [DISTWIDTH] IN BLOOD BY AUTOMATED COUNT: 12.7 % (ref 10–15)
GLUCOSE SERPL-MCNC: 84 MG/DL (ref 70–99)
GLUCOSE UR STRIP-MCNC: NEGATIVE MG/DL
HCO3 SERPL-SCNC: 27 MMOL/L (ref 22–29)
HCT VFR BLD AUTO: 39.4 % (ref 35–47)
HGB BLD-MCNC: 13.4 G/DL (ref 11.7–15.7)
HGB UR QL STRIP: NEGATIVE
KETONES UR STRIP-MCNC: NEGATIVE MG/DL
LEUKOCYTE ESTERASE UR QL STRIP: ABNORMAL
MCH RBC QN AUTO: 31.8 PG (ref 26.5–33)
MCHC RBC AUTO-ENTMCNC: 34 G/DL (ref 31.5–36.5)
MCV RBC AUTO: 93 FL (ref 78–100)
NITRATE UR QL: NEGATIVE
PH UR STRIP: 6 [PH] (ref 5–7)
PHOSPHATE SERPL-MCNC: 3.7 MG/DL (ref 2.5–4.5)
PLATELET # BLD AUTO: 247 10E3/UL (ref 150–450)
POTASSIUM SERPL-SCNC: 4.3 MMOL/L (ref 3.4–5.3)
PROT/CREAT 24H UR: NORMAL MG/G{CREAT}
RBC # BLD AUTO: 4.22 10E6/UL (ref 3.8–5.2)
RBC URINE: 2 /HPF
SODIUM SERPL-SCNC: 137 MMOL/L (ref 135–145)
SP GR UR STRIP: 1 (ref 1–1.03)
SQUAMOUS EPITHELIAL: 6 /HPF
UROBILINOGEN UR STRIP-MCNC: NORMAL MG/DL
WBC # BLD AUTO: 6.5 10E3/UL (ref 4–11)
WBC URINE: 3 /HPF

## 2025-04-21 PROCEDURE — 85027 COMPLETE CBC AUTOMATED: CPT | Performed by: PATHOLOGY

## 2025-04-21 PROCEDURE — 36415 COLL VENOUS BLD VENIPUNCTURE: CPT | Performed by: PATHOLOGY

## 2025-04-21 PROCEDURE — 81001 URINALYSIS AUTO W/SCOPE: CPT | Performed by: PATHOLOGY

## 2025-04-21 PROCEDURE — 82306 VITAMIN D 25 HYDROXY: CPT | Performed by: INTERNAL MEDICINE

## 2025-04-21 PROCEDURE — 82043 UR ALBUMIN QUANTITATIVE: CPT | Performed by: INTERNAL MEDICINE

## 2025-04-21 PROCEDURE — 84156 ASSAY OF PROTEIN URINE: CPT | Performed by: PATHOLOGY

## 2025-04-21 PROCEDURE — 99000 SPECIMEN HANDLING OFFICE-LAB: CPT | Performed by: PATHOLOGY

## 2025-04-21 PROCEDURE — 80069 RENAL FUNCTION PANEL: CPT | Performed by: PATHOLOGY

## 2025-04-22 LAB
CREAT UR-MCNC: 21.1 MG/DL
MICROALBUMIN UR-MCNC: <12 MG/L
MICROALBUMIN/CREAT UR: NORMAL MG/G{CREAT}
VIT D+METAB SERPL-MCNC: 22 NG/ML (ref 20–50)

## 2025-04-28 ENCOUNTER — VIRTUAL VISIT (OUTPATIENT)
Dept: NEPHROLOGY | Facility: CLINIC | Age: 35
End: 2025-04-28
Attending: INTERNAL MEDICINE
Payer: COMMERCIAL

## 2025-04-28 DIAGNOSIS — I73.00 RAYNAUD'S DISEASE WITHOUT GANGRENE: ICD-10-CM

## 2025-04-28 DIAGNOSIS — Z52.4 KIDNEY DONOR: ICD-10-CM

## 2025-04-28 DIAGNOSIS — N18.2 CKD (CHRONIC KIDNEY DISEASE) STAGE 2, GFR 60-89 ML/MIN: Primary | ICD-10-CM

## 2025-04-28 DIAGNOSIS — N18.9 CHRONIC KIDNEY DISEASE AFTER DONOR NEPHRECTOMY: ICD-10-CM

## 2025-04-28 DIAGNOSIS — Z52.4 CHRONIC KIDNEY DISEASE AFTER DONOR NEPHRECTOMY: ICD-10-CM

## 2025-04-28 PROCEDURE — 98007 SYNCH AUDIO-VIDEO EST HI 40: CPT | Performed by: INTERNAL MEDICINE

## 2025-04-28 PROCEDURE — G2211 COMPLEX E/M VISIT ADD ON: HCPCS | Performed by: INTERNAL MEDICINE

## 2025-04-28 PROCEDURE — 99417 PROLNG OP E/M EACH 15 MIN: CPT | Performed by: INTERNAL MEDICINE

## 2025-04-28 PROCEDURE — 1126F AMNT PAIN NOTED NONE PRSNT: CPT | Mod: 95 | Performed by: INTERNAL MEDICINE

## 2025-04-28 NOTE — LETTER
4/28/2025       RE: Lizzeth Alarcon  [address]  [facility] [address]     Dear Colleague,    Thank you for referring your patient, Lizzeth Alarcon, to the [facility] at [facility]. Please see a copy of my visit note below.    Virtual Visit Details    Type of service:  Video Visit 8.32-8.52    Originating Location (pt. Location): Home    Distant Location (provider location):  On-site  Platform used for Video Visit: [facility]            Nephrology Initial Consult  April 28, 2025      Lizzeth Alarcon MRN:[id number] YOB: 1990  Primary care provider: Rita Blum  Requesting physician: Celine Castro MD     REASON FOR CONSULT:     HISTORY OF PRESENT ILLNESS:  Lizzeth Alarcon is a 35 year old with Hx of donor nephrectomy with CKD 1, limited scleroderma who is here for CKD 2 consult.    Did not      PAST MEDICAL HISTORY:  Reviewed with patient on 04/28/2025     Past Medical History:   Diagnosis Date     Acne      Closed fracture of olecranon process of ulna 04/2006    left olecranon fracture     Contraceptive management 2008     Exercise-induced asthma      Migraine headache        Past Surgical History:   Procedure Laterality Date     LAPAROSCOPIC DONOR HAND ASSISTED KIDNEY NON-DIRECTED Left 1/3/2023    Procedure: Laparoscopic Hand Assisted Living Non-Directed Left Kidney Donor;  Surgeon: Arron Moran MD;  Location:  OR     [facility] Bilateral      WISDOM TOOTH EXTRACTION          MEDICATIONS:  PTA Meds  Current Outpatient Medications   Medication Sig Dispense Refill     amLODIPine (NORVASC) 5 MG tablet Take 0.5 tablets (2.5 mg) by mouth daily as needed (for Raynaud's can increase to 5mg daily). 45 tablet 2     omeprazole (PRILOSEC) 20 MG DR capsule TAKE 1 CAPSULE BY MOUTH EVERY DAY 90 capsule 1     spironolactone (ALDACTONE) 100 MG tablet        spironolactone (ALDACTONE) 50 MG tablet  Take 1 tablet (50 mg) by mouth daily. 90 tablet 1     SUMAtriptan (IMITREX) 25 MG tablet Take 1 tablet (25 mg) by mouth at onset of headache for migraine May repeat in 2 hours. Max 4 tablets/24 hours. 30 tablet 0     No current facility-administered medications for this visit.         ALLERGIES:    Allergies   Allergen Reactions     Minocycline      Joint pains       REVIEW OF SYSTEMS:  A comprehensive of systems was negative except as noted above.    SOCIAL HISTORY:   Social History     Socioeconomic History     Marital status: Single     Spouse name: single     Number of children: 0     Years of education: Not on file     Highest education level: Not on file   Occupational History     Occupation: marketing   Tobacco Use     Smoking status: Never     Passive exposure: Never     Smokeless tobacco: Never     Tobacco comments:     no second hand smoke   Vaping Use     Vaping status: Never Used   Substance and Sexual Activity     Alcohol use: Yes     Alcohol/week: 5.0 - 7.0 standard drinks of alcohol     Types: 5 - 7 Standard drinks or equivalent per week     Drug use: No     Sexual activity: Never   Other Topics Concern     Parent/sibling w/ CABG, MI or angioplasty before 65F 55M? No   Social History Narrative     Not on file     Social Drivers of Health     Financial Resource Strain: Low Risk  (11/14/2024)    Financial Resource Strain      Within the past 12 months, have you or your family members you live with been unable to get utilities (heat, electricity) when it was really needed?: No   Food Insecurity: Low Risk  (11/14/2024)    Food Insecurity      Within the past 12 months, did you worry that your food would run out before you got money to buy more?: No      Within the past 12 months, did the food you bought just not last and you didn t have money to get more?: No   Transportation Needs: Low Risk  (11/14/2024)    Transportation Needs      Within the past 12 months, has lack of transportation kept you from  medical appointments, getting your medicines, non-medical meetings or appointments, work, or from getting things that you need?: No   Physical Activity: Unknown (11/14/2024)    Exercise Vital Sign      Days of Exercise per Week: 3 days      Minutes of Exercise per Session: Not on file   Stress: No Stress Concern Present (11/14/2024)    [employer] - Occupational Stress Questionnaire      Feeling of Stress : Only a little   Social Connections: Unknown (11/14/2024)    Social Connection and Isolation Panel [NHANES]      Frequency of Communication with Friends and Family: Not on file      Frequency of Social Gatherings with Friends and Family: Twice a week      Attends Nondenominational Services: Not on file      Active Member of Clubs or Organizations: Not on file      Attends Club or Organization Meetings: Not on file      Marital Status: Not on file   Interpersonal Safety: Low Risk  (11/19/2024)    Interpersonal Safety      Do you feel physically and emotionally safe where you currently live?: Yes      Within the past 12 months, have you been hit, slapped, kicked or otherwise physically hurt by someone?: No      Within the past 12 months, have you been humiliated or emotionally abused in other ways by your partner or ex-partner?: No   Housing Stability: Low Risk  (11/14/2024)    Housing Stability      Do you have housing? : Yes      Are you worried about losing your housing?: No     Reviewed with patient.    FAMILY MEDICAL HISTORY:   Family History   Problem Relation Age of Onset     Thyroid Disease Mother      Depression Mother      Mental Illness Mother      Asthma Father      Hypertension Sister      Respiratory Brother         Exercised induced Asthma     Breast Cancer Other      Colon Cancer No family hx of      Diabetes No family hx of      Myocardial Infarction No family hx of      Melanoma No family hx of      Skin Cancer No family hx of      Kidney Disease No family hx of      Reviewed  with patient.    PHYSICAL EXAM:   @FLOWSTAT(6,898632,[id number])@      @FLOWSTAT(8)@ @FLOWSTAT(9,648418,10)@       There were no vitals taken for this visit.   On video, the patient is alert and oriented and not in acute distress.  No lower extremity edema or rashes. No skin changes similar to schlerodactyly        LABS:   Last Renal Panel:  Sodium   Date Value Ref Range Status   04/21/2025 137 135 - 145 mmol/L Final     Potassium   Date Value Ref Range Status   04/21/2025 4.3 3.4 - 5.3 mmol/L Final   07/22/2019 4.1 3.4 - 5.3 mmol/L Final     Chloride   Date Value Ref Range Status   04/21/2025 101 98 - 107 mmol/L Final     Carbon Dioxide (CO2)   Date Value Ref Range Status   04/21/2025 27 22 - 29 mmol/L Final     Anion Gap   Date Value Ref Range Status   04/21/2025 9 7 - 15 mmol/L Final     Glucose   Date Value Ref Range Status   04/21/2025 84 70 - 99 mg/dL Final   06/02/2022 82 70 - 99 mg/dL Final   04/29/2021 76 70 - 99 mg/dL Final     Comment:     Fasting specimen     GLUCOSE BY METER POCT   Date Value Ref Range Status   01/03/2023 92 70 - 99 mg/dL Final     Urea Nitrogen   Date Value Ref Range Status   04/21/2025 13.1 6.0 - 20.0 mg/dL Final     Creatinine   Date Value Ref Range Status   04/21/2025 1.12 (H) 0.51 - 0.95 mg/dL Final   07/22/2019 0.74 0.52 - 1.04 mg/dL Final     GFR Estimate   Date Value Ref Range Status   04/21/2025 65 >60 mL/min/1.73m2 Final     Comment:     eGFR calculated using 2021 CKD-EPI equation.   07/22/2019 >90 >60 mL/min/[1.73_m2] Final     Comment:     Non  GFR Calc  Starting 12/18/2018, serum creatinine based estimated GFR (eGFR) will be   calculated using the Chronic Kidney Disease Epidemiology Collaboration   (CKD-EPI) equation.       Calcium   Date Value Ref Range Status   04/21/2025 9.7 8.8 - 10.4 mg/dL Final     Phosphorus   Date Value Ref Range Status   04/21/2025 3.7 2.5 - 4.5 mg/dL Final     Albumin   Date Value Ref Range Status   04/21/2025 4.5 3.5 - 5.2 g/dL  Final       URINE STUDIES  Recent Labs   Lab Test 04/21/25  1613 12/24/24  1047 06/28/24  1225 12/28/23  1417   COLOR Straw Straw Yellow Straw   APPEARANCE Clear Clear Clear Clear   URINEGLC Negative Negative Negative Negative   URINEBILI Negative Negative Negative Negative   URINEKETONE Negative Negative Negative Negative   SG 1.004 1.003 <=1.005 1.006   UBLD Negative Negative Negative Negative   URINEPH 6.0 6.5 6.5 6.0   PROTEIN Negative Negative Negative Negative   UROBILINOGEN  --   --  0.2  --    NITRITE Negative Negative Negative Negative   LEUKEST Moderate* Negative Small* Negative   RBCU 2 1 0-2 <1   WBCU 3 <1 5-10* 0     No lab results found.  PTH  No lab results found.  IRON STUDIES  No lab results found.    IMAGING:  I review the CTA renal on  10/28/22 which showed  1. RIGHT KIDNEY:       A. The right kidney contains a single renal artery, a single  renal vein, and a single ureter.       B. The right kidney parenchyma is normal.        C. The renal volume is 134.18 cc.     2. LEFT KIDNEY:       A. The left kidney contains a single renal artery, a single renal  vein, and a single ureter.       B. The left kidney parenchyma is normal.        C. The renal volume is 144.29 cc.       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  Creatinine prior to donation was 0.7-0.9 with iohexol clearance 113 mL/min (Std 100 ml/min).  After nephrectomy, creatinine increased to 1.56 but then improved to 1.32 and now ranging between 1.12-1.16 with EGFR 65 mL/min.  UA is completely bland without protein or blood.  However, in 2024, she was diagnosed with limited scleroderma with positive symptoms of Raynaud's phenomenon and GERD.  No  other symptoms from scleroderma present at this time.  She has EUGENIA 1: 1280 and +Centromere >240, +RF 24, low positive Beta 2 glycoprotein IgM.  RNP is negative.    At this time, I have very low suspicion for kidney involvement of scleroderma as the patient has no hematuria nor albuminuria.  However, I discussed with her that there is a change at if her scleroderma progresses it may involve her kidneys.  We discussed about potential signs and symptoms such as lower extremity edema, new onset hypertension, dark urine or foamy urine.  I recommend that she obtain blood pressure cuff and monitor at least 2-4 times a month and if persistently above 130/80 then she should let us know.    Patient was prescribed amlodipine 2.5 mg as needed for Raynaud's phenomenon but she will use it only during very cold period anyway. During summer she does not have Raynaud's anyway.  I do not think that she is indicated for ACE inhibitor at this time given that she has no proteinuria and there is no evidence suggesting starting ACE inhibitor acid prophylactic measure and scleroderma renal involvement.  Of note, the patient has negative RNP so the risks of scleroderma renal crisis is lower    At this time, her creatinine is 1.12 with EGFR 65.  UA is bland and UACR and UPCR undetectable.    -Reassuring that her kidney function is as expected for someone who donate the kidneys  - No evidence of scleroderma and will kidney at this time  - Advised her to monitor blood pressure 2-4 times a month and keep less than 130/80  - Discussed about signs or symptoms that are concerning for kidney involvement such as both kidney edema, new onset hypertension, proteinuria or dark urine  - Discussed about healthy lifestyle and drink a lot of water at least 70 ounces per day  - Avoid NSAIDs  - No role for ACE inhibitor at this time  - Continue amlodipine as needed per rheumatology recommendation      Follow-up in 1 year with labs    The longitudinal plan of  care for CKD stage 2, s/p nephrectoy, limited scleroderma was addressed during this visit. Due to the added complexity in care, I will continue to support Lizzeth Kaminskidiego the subsequent management of this condition(s) and with the ongoing continuity of care of this condition(s).    I personally spent 60 minutes on the date of the encounter doing chart review, history and exam, documentation and further activities as noted above. 20 (8.32-8.52) minutes of this visit is dedicated to direct patient interaction via video.    Hayder Norman MD on 04/28/2025              Again, thank you for allowing me to participate in the care of your patient.      Sincerely,    Hayder Norman MD

## 2025-04-28 NOTE — PROGRESS NOTES
Virtual Visit Details    Type of service:  Video Visit 8.32-8.52    Originating Location (pt. Location): Home    Distant Location (provider location):  On-site  Platform used for Video Visit: [facility]            Nephrology Initial Consult  April 28, 2025      Lizzeth Alarcon MRN:[id number] YOB: 1990  Primary care provider: Rita Blum  Requesting physician: Celine Castro MD     REASON FOR CONSULT:     HISTORY OF PRESENT ILLNESS:  Lizzeth Alarcon is a 35 year old with Hx of donor nephrectomy with CKD 1, limited scleroderma who is here for CKD 2 consult.    Did not      PAST MEDICAL HISTORY:  Reviewed with patient on 04/28/2025     Past Medical History:   Diagnosis Date    Acne     Closed fracture of olecranon process of ulna 04/2006    left olecranon fracture    Contraceptive management 2008    Exercise-induced asthma     Migraine headache        Past Surgical History:   Procedure Laterality Date    LAPAROSCOPIC DONOR HAND ASSISTED KIDNEY NON-DIRECTED Left 1/3/2023    Procedure: Laparoscopic Hand Assisted Living Non-Directed Left Kidney Donor;  Surgeon: Arron Moran MD;  Location:  OR    [facility] Bilateral     WISDOM TOOTH EXTRACTION          MEDICATIONS:  PTA Meds  Current Outpatient Medications   Medication Sig Dispense Refill    amLODIPine (NORVASC) 5 MG tablet Take 0.5 tablets (2.5 mg) by mouth daily as needed (for Raynaud's can increase to 5mg daily). 45 tablet 2    omeprazole (PRILOSEC) 20 MG DR capsule TAKE 1 CAPSULE BY MOUTH EVERY DAY 90 capsule 1    spironolactone (ALDACTONE) 100 MG tablet       spironolactone (ALDACTONE) 50 MG tablet Take 1 tablet (50 mg) by mouth daily. 90 tablet 1    SUMAtriptan (IMITREX) 25 MG tablet Take 1 tablet (25 mg) by mouth at onset of headache for migraine May repeat in 2 hours. Max 4 tablets/24 hours. 30 tablet 0     No current facility-administered medications for this visit.         ALLERGIES:    Allergies   Allergen Reactions     Minocycline      Joint pains       REVIEW OF SYSTEMS:  A comprehensive of systems was negative except as noted above.    SOCIAL HISTORY:   Social History     Socioeconomic History    Marital status: Single     Spouse name: single    Number of children: 0    Years of education: Not on file    Highest education level: Not on file   Occupational History    Occupation: marketing   Tobacco Use    Smoking status: Never     Passive exposure: Never    Smokeless tobacco: Never    Tobacco comments:     no second hand smoke   Vaping Use    Vaping status: Never Used   Substance and Sexual Activity    Alcohol use: Yes     Alcohol/week: 5.0 - 7.0 standard drinks of alcohol     Types: 5 - 7 Standard drinks or equivalent per week    Drug use: No    Sexual activity: Never   Other Topics Concern    Parent/sibling w/ CABG, MI or angioplasty before 65F 55M? No   Social History Narrative    Not on file     Social Drivers of Health     Financial Resource Strain: Low Risk  (11/14/2024)    Financial Resource Strain     Within the past 12 months, have you or your family members you live with been unable to get utilities (heat, electricity) when it was really needed?: No   Food Insecurity: Low Risk  (11/14/2024)    Food Insecurity     Within the past 12 months, did you worry that your food would run out before you got money to buy more?: No     Within the past 12 months, did the food you bought just not last and you didn t have money to get more?: No   Transportation Needs: Low Risk  (11/14/2024)    Transportation Needs     Within the past 12 months, has lack of transportation kept you from medical appointments, getting your medicines, non-medical meetings or appointments, work, or from getting things that you need?: No   Physical Activity: Unknown (11/14/2024)    Exercise Vital Sign     Days of Exercise per Week: 3 days     Minutes of Exercise per Session: Not on file   Stress: No Stress Concern Present (11/14/2024)    [employer] [place] [employer]  Occupational Health - Occupational Stress Questionnaire     Feeling of Stress : Only a little   Social Connections: Unknown (11/14/2024)    Social Connection and Isolation Panel [NHANES]     Frequency of Communication with Friends and Family: Not on file     Frequency of Social Gatherings with Friends and Family: Twice a week     Attends Tenriism Services: Not on file     Active Member of Clubs or Organizations: Not on file     Attends Club or Organization Meetings: Not on file     Marital Status: Not on file   Interpersonal Safety: Low Risk  (11/19/2024)    Interpersonal Safety     Do you feel physically and emotionally safe where you currently live?: Yes     Within the past 12 months, have you been hit, slapped, kicked or otherwise physically hurt by someone?: No     Within the past 12 months, have you been humiliated or emotionally abused in other ways by your partner or ex-partner?: No   Housing Stability: Low Risk  (11/14/2024)    Housing Stability     Do you have housing? : Yes     Are you worried about losing your housing?: No     Reviewed with patient.    FAMILY MEDICAL HISTORY:   Family History   Problem Relation Age of Onset    Thyroid Disease Mother     Depression Mother     Mental Illness Mother     Asthma Father     Hypertension Sister     Respiratory Brother         Exercised induced Asthma    Breast Cancer Other     Colon Cancer No family hx of     Diabetes No family hx of     Myocardial Infarction No family hx of     Melanoma No family hx of     Skin Cancer No family hx of     Kidney Disease No family hx of      Reviewed with patient.    PHYSICAL EXAM:   @FLOWSTAT(6,016309,[id number],[id number])@      @FLOWSTAT(8)@ @FLOWSTAT(9,780037,10)@       There were no vitals taken for this visit.   On video, the patient is alert and oriented and not in acute distress.  No lower extremity edema or rashes. No skin changes similar to schlerodactyly        LABS:   Last Renal Panel:  Sodium   Date Value Ref Range Status    04/21/2025 137 135 - 145 mmol/L Final     Potassium   Date Value Ref Range Status   04/21/2025 4.3 3.4 - 5.3 mmol/L Final   07/22/2019 4.1 3.4 - 5.3 mmol/L Final     Chloride   Date Value Ref Range Status   04/21/2025 101 98 - 107 mmol/L Final     Carbon Dioxide (CO2)   Date Value Ref Range Status   04/21/2025 27 22 - 29 mmol/L Final     Anion Gap   Date Value Ref Range Status   04/21/2025 9 7 - 15 mmol/L Final     Glucose   Date Value Ref Range Status   04/21/2025 84 70 - 99 mg/dL Final   06/02/2022 82 70 - 99 mg/dL Final   04/29/2021 76 70 - 99 mg/dL Final     Comment:     Fasting specimen     GLUCOSE BY METER POCT   Date Value Ref Range Status   01/03/2023 92 70 - 99 mg/dL Final     Urea Nitrogen   Date Value Ref Range Status   04/21/2025 13.1 6.0 - 20.0 mg/dL Final     Creatinine   Date Value Ref Range Status   04/21/2025 1.12 (H) 0.51 - 0.95 mg/dL Final   07/22/2019 0.74 0.52 - 1.04 mg/dL Final     GFR Estimate   Date Value Ref Range Status   04/21/2025 65 >60 mL/min/1.73m2 Final     Comment:     eGFR calculated using 2021 CKD-EPI equation.   07/22/2019 >90 >60 mL/min/[1.73_m2] Final     Comment:     Non  GFR Calc  Starting 12/18/2018, serum creatinine based estimated GFR (eGFR) will be   calculated using the Chronic Kidney Disease Epidemiology Collaboration   (CKD-EPI) equation.       Calcium   Date Value Ref Range Status   04/21/2025 9.7 8.8 - 10.4 mg/dL Final     Phosphorus   Date Value Ref Range Status   04/21/2025 3.7 2.5 - 4.5 mg/dL Final     Albumin   Date Value Ref Range Status   04/21/2025 4.5 3.5 - 5.2 g/dL Final       URINE STUDIES  Recent Labs   Lab Test 04/21/25  1613 12/24/24  1047 06/28/24  1225 12/28/23  1417   COLOR Straw Straw Yellow Straw   APPEARANCE Clear Clear Clear Clear   URINEGLC Negative Negative Negative Negative   URINEBILI Negative Negative Negative Negative   URINEKETONE Negative Negative Negative Negative   SG 1.004 1.003 <=1.005 1.006   UBLD Negative Negative  Negative Negative   URINEPH 6.0 6.5 6.5 6.0   PROTEIN Negative Negative Negative Negative   UROBILINOGEN  --   --  0.2  --    NITRITE Negative Negative Negative Negative   LEUKEST Moderate* Negative Small* Negative   RBCU 2 1 0-2 <1   WBCU 3 <1 5-10* 0     No lab results found.  PTH  No lab results found.  IRON STUDIES  No lab results found.    IMAGING:  I review the CTA renal on  10/28/22 which showed  1. RIGHT KIDNEY:       A. The right kidney contains a single renal artery, a single  renal vein, and a single ureter.       B. The right kidney parenchyma is normal.        C. The renal volume is 134.18 cc.     2. LEFT KIDNEY:       A. The left kidney contains a single renal artery, a single renal  vein, and a single ureter.       B. The left kidney parenchyma is normal.        C. The renal volume is 144.29 cc.       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  Creatinine prior to donation was 0.7-0.9 with iohexol clearance 113 mL/min (Std 100 ml/min).  After nephrectomy, creatinine increased to 1.56 but then improved to 1.32 and now ranging between 1.12-1.16 with EGFR 65 mL/min.  UA is completely bland without protein or blood.  However, in 2024, she was diagnosed with limited scleroderma with positive symptoms of Raynaud's phenomenon and GERD.  No other symptoms from scleroderma present at this time.  She has EUGENIA 1: 1280 and +Centromere >240, +RF 24, low positive Beta 2 glycoprotein IgM.  RNP is negative.    At this time, I have very low suspicion for kidney involvement of scleroderma as the patient has no hematuria nor albuminuria.  However, I discussed with her that there is a change at if her scleroderma progresses it  may involve her kidneys.  We discussed about potential signs and symptoms such as lower extremity edema, new onset hypertension, dark urine or foamy urine.  I recommend that she obtain blood pressure cuff and monitor at least 2-4 times a month and if persistently above 130/80 then she should let us know.    Patient was prescribed amlodipine 2.5 mg as needed for Raynaud's phenomenon but she will use it only during very cold period anyway. During summer she does not have Raynaud's anyway.  I do not think that she is indicated for ACE inhibitor at this time given that she has no proteinuria and there is no evidence suggesting starting ACE inhibitor acid prophylactic measure and scleroderma renal involvement.  Of note, the patient has negative RNP so the risks of scleroderma renal crisis is lower    At this time, her creatinine is 1.12 with EGFR 65.  UA is bland and UACR and UPCR undetectable.    -Reassuring that her kidney function is as expected for someone who donate the kidneys  - No evidence of scleroderma and will kidney at this time  - Advised her to monitor blood pressure 2-4 times a month and keep less than 130/80  - Discussed about signs or symptoms that are concerning for kidney involvement such as both kidney edema, new onset hypertension, proteinuria or dark urine  - Discussed about healthy lifestyle and drink a lot of water at least 70 ounces per day  - Avoid NSAIDs  - No role for ACE inhibitor at this time  - Continue amlodipine as needed per rheumatology recommendation      Follow-up in 1 year with labs    The longitudinal plan of care for CKD stage 2, s/p nephrectoy, limited scleroderma was addressed during this visit. Due to the added complexity in care, I will continue to support Lizzeth Reza the subsequent management of this condition(s) and with the ongoing continuity of care of this condition(s).    I personally spent 60 minutes on the date of the encounter doing chart review, history and  exam, documentation and further activities as noted above. 20 (8.32-8.52) minutes of this visit is dedicated to direct patient interaction via video.    Hayder Norman MD on 04/28/2025

## 2025-04-28 NOTE — PATIENT INSTRUCTIONS
Your kidney function is about 65% as expected for someone who donates kidney  At this time I do not think that you have any inflammation in the kidneys from scleroderma  Please obtain blood pressure cuff and monitor at least twice a month and if > 130/80 mmHg then please let us know  Drink a lot of water and keep it at least 70 ounces per day  If you notice any lower extremity edema, foamy urine, dark urine please let us know  See you in 12 months with labs

## 2025-04-28 NOTE — NURSING NOTE
Current patient location: [address]  [facility] [address]    Is the patient currently in the state of [place]? YES    Visit mode: VIDEO    If the visit is dropped, the patient can be reconnected by:VIDEO VISIT: Send to e-mail at: [email or web address]    Will anyone else be joining the visit? NO  (If patient encounters technical issues they should call [phone]    Are changes needed to the allergy or medication list? No    Are refills needed on medications prescribed by this physician? NO    Rooming Documentation:  Questionnaire(s) completed    Reason for visit: Consult    Bernice GUAMAN

## 2025-04-30 DIAGNOSIS — L70.9 ACNE, UNSPECIFIED ACNE TYPE: ICD-10-CM

## 2025-04-30 RX ORDER — SPIRONOLACTONE 50 MG/1
50 TABLET, FILM COATED ORAL DAILY
Qty: 90 TABLET | Refills: 3 | Status: SHIPPED | OUTPATIENT
Start: 2025-04-30

## 2025-06-18 ENCOUNTER — TELEPHONE (OUTPATIENT)
Dept: NEPHROLOGY | Facility: CLINIC | Age: 35
End: 2025-06-18
Payer: COMMERCIAL

## 2025-06-18 NOTE — TELEPHONE ENCOUNTER
Left Voicemail (1st Attempt) for the patient to call back and schedule the following:    Appointment type: RETURN GLOM  Provider: JAILYN  Return date: 4/28/2026  Specialty phone number: [phone]  Additional appointment(s) needed: LABS  Additonal Notes: N/A   
Less than monthly

## 2025-07-21 DIAGNOSIS — M34.9 LIMITED SCLERODERMA (H): ICD-10-CM

## 2025-07-21 LAB
DLCOCOR-%PRED-PRE: 97 %
DLCOCOR-PRE: 23.52 ML/MIN/MMHG
DLCOUNC-%PRED-PRE: 99 %
DLCOUNC-PRE: 23.87 ML/MIN/MMHG
DLCOUNC-PRED: 24.06 ML/MIN/MMHG
ERV-%PRED-PRE: 85 %
ERV-PRE: 1.3 L
ERV-PRED: 1.52 L
EXPTIME-PRE: 7.47 SEC
FEF2575-%PRED-PRE: 86 %
FEF2575-PRE: 3.01 L/SEC
FEF2575-PRED: 3.48 L/SEC
FEFMAX-%PRED-PRE: 84 %
FEFMAX-PRE: 6.44 L/SEC
FEFMAX-PRED: 7.64 L/SEC
FEV1-%PRED-PRE: 93 %
FEV1-PRE: 3.17 L
FEV1FEV6-PRE: 80 %
FEV1FEV6-PRED: 85 %
FEV1FVC-PRE: 79 %
FEV1FVC-PRED: 83 %
FEV1SVC-PRE: 80 L
FEV1SVC-PRED: 75 L
FIFMAX-PRE: 3.48 L/SEC
FRCPLETH-%PRED-PRE: 79 %
FRCPLETH-PRE: 2.41 L
FRCPLETH-PRED: 3.05 L
FVC-%PRED-PRE: 97 %
FVC-PRE: 4 L
FVC-PRED: 4.11 L
GAW-PRED: 1.03 L/S/CMH2O
IC-%PRED-PRE: 90 %
IC-PRE: 2.65 L
IC-PRED: 2.94 L
Lab: 95 %
RVPLETH-%PRED-PRE: 78 %
RVPLETH-PRE: 1.11 L
RVPLETH-PRED: 1.41 L
SGAW-PRED: 0.2 1/CMH2O*S
SRAW-PRED: < 4.76 CMH2O*S
TLCPLETH-%PRED-PRE: 84 %
TLCPLETH-PRE: 5.06 L
TLCPLETH-PRED: 5.96 L
VA-%PRED-PRE: 88 %
VA-PRE: 4.93 L
VC-%PRED-PRE: 87 %
VC-PRE: 3.95 L
VC-PRED: 4.53 L

## 2025-07-21 PROCEDURE — 94729 DIFFUSING CAPACITY: CPT | Performed by: INTERNAL MEDICINE

## 2025-07-21 PROCEDURE — 94726 PLETHYSMOGRAPHY LUNG VOLUMES: CPT | Performed by: INTERNAL MEDICINE

## 2025-07-21 PROCEDURE — 94150 VITAL CAPACITY TEST: CPT | Performed by: INTERNAL MEDICINE

## 2025-07-21 PROCEDURE — 94375 RESPIRATORY FLOW VOLUME LOOP: CPT | Performed by: INTERNAL MEDICINE

## 2025-07-21 NOTE — PROGRESS NOTES
Lizzeth Alarcon comes into clinic today at the request of Dr. Castro Ordering Provider for PFT      Estuardo Gaona , RRT

## 2025-08-02 DIAGNOSIS — K21.00 GASTROESOPHAGEAL REFLUX DISEASE WITH ESOPHAGITIS WITHOUT HEMORRHAGE: ICD-10-CM

## 2025-08-04 RX ORDER — OMEPRAZOLE 20 MG/1
20 CAPSULE, DELAYED RELEASE ORAL DAILY
Qty: 90 CAPSULE | Refills: 1 | OUTPATIENT
Start: 2025-08-04

## (undated) DEVICE — SURGICEL ABSORBABLE HEMOSTAT SNOW 4"X4" 2083

## (undated) DEVICE — LINEN TOWEL PACK X6 WHITE 5487

## (undated) DEVICE — Device

## (undated) DEVICE — SU VICRYL 0 TIE 54" J608H

## (undated) DEVICE — SOL ADH LIQUID BENZOIN SWAB 0.6ML C1544

## (undated) DEVICE — ENDO TROCAR FIRST ENTRY KII FIOS Z-THRD 12X100MM CTF73

## (undated) DEVICE — BNDG ABDOMINAL BINDER 9X45-62" 79-89071

## (undated) DEVICE — PREP CHLORAPREP 26ML TINTED HI-LITE ORANGE 930815

## (undated) DEVICE — TUBING SMOKE EVAC PNEUMOCLEAR HIGH FLOW 0620050250

## (undated) DEVICE — DRSG PRIMAPORE 02X3" 7133

## (undated) DEVICE — STRAP UNIVERSAL POSITIONING 2-PIECE 4X47X76" 91-287

## (undated) DEVICE — DRAPE IOBAN INCISE 23X17" 6650EZ

## (undated) DEVICE — DEVICE SUTURE GRASPER TROCAR CLOSURE 14GA PMITCSG

## (undated) DEVICE — DECANTER BAG 2002S

## (undated) DEVICE — ENDO SCOPE WARMER SEAL  C3101

## (undated) DEVICE — CLIP APPLIER ENDO ROTATING 10MM MED/LG ER320

## (undated) DEVICE — ADPT 5 IN 1 360

## (undated) DEVICE — SU PDS II 0 TP-1 60" Z991G

## (undated) DEVICE — SUCTION IRR STRYKERFLOW II W/TIP 250-070-520

## (undated) DEVICE — GLOVE SENSICARE 7.0 MSG1070 LATEX FREE

## (undated) DEVICE — SOL NACL 0.9% IRRIG 1000ML BOTTLE 2F7124

## (undated) DEVICE — ESU ENDO SCISSORS 5MM CVD 5DCS

## (undated) DEVICE — LINEN TOWEL PACK X5 5464

## (undated) DEVICE — ANTIFOG SOLUTION W/FOAM PAD 31142527

## (undated) DEVICE — DRSG PRIMAPORE 03 1/8X6" 66000318

## (undated) DEVICE — DRAPE ISOLATION BAG 1003

## (undated) DEVICE — SU SILK 4-0 TIE 12X30" A303H

## (undated) DEVICE — ENDO TROCAR SLEEVE KII Z-THREADED 12X100MM CTS22

## (undated) DEVICE — SHEARS HARMONIC ULTRASONIC LAP 36CM CURVE TIP HAR1136

## (undated) DEVICE — SU MONOCRYL 4-0 PS-2 27" UND Y426H

## (undated) DEVICE — CLIP APPLIER ENDO ROTATING 12MM LG ER420

## (undated) DEVICE — CATH TRAY FOLEY SURESTEP 16FR W/URNE MTR STLK LATEX A303316A

## (undated) DEVICE — SOL WATER IRRIG 1000ML BOTTLE 2F7114

## (undated) DEVICE — SU SILK 2-0 TIE 12X30" A305H

## (undated) DEVICE — SOL NACL 0.9% INJ 1000ML BAG 2B1324X

## (undated) DEVICE — TUBING IRRIG CYSTO/BLADDER SET 81" LF 2C4040

## (undated) DEVICE — DRSG STERI STRIP 1/2X4" R1547

## (undated) DEVICE — WIPES FOLEY CARE SURESTEP PROVON DFC100

## (undated) DEVICE — SU SILK 3-0 TIE 12X30" A304H

## (undated) DEVICE — DRSG ABDOMINAL 07 1/2X8" 7197D

## (undated) DEVICE — SUCTION MANIFOLD NEPTUNE 2 SYS 4 PORT 0702-020-000

## (undated) DEVICE — CLIP ENDO HEMO-LOC PURPLE LG 544240

## (undated) DEVICE — BASIN SET SINGLE STERILE 13752-624

## (undated) DEVICE — DRAPE SLUSH/WARMER 66X44" ORS-320

## (undated) DEVICE — SU VICRYL 3-0 SH 27" UND J416H

## (undated) DEVICE — STPL POWERED ECHELON VASC 35MM PVE35A

## (undated) RX ORDER — FENTANYL CITRATE 50 UG/ML
INJECTION, SOLUTION INTRAMUSCULAR; INTRAVENOUS
Status: DISPENSED
Start: 2023-01-03

## (undated) RX ORDER — ACETAMINOPHEN 325 MG/1
TABLET ORAL
Status: DISPENSED
Start: 2023-01-03

## (undated) RX ORDER — PROTAMINE SULFATE 10 MG/ML
INJECTION, SOLUTION INTRAVENOUS
Status: DISPENSED
Start: 2023-01-03

## (undated) RX ORDER — HEPARIN SODIUM 1000 [USP'U]/ML
INJECTION, SOLUTION INTRAVENOUS; SUBCUTANEOUS
Status: DISPENSED
Start: 2023-01-03

## (undated) RX ORDER — HYDROMORPHONE HCL IN WATER/PF 6 MG/30 ML
PATIENT CONTROLLED ANALGESIA SYRINGE INTRAVENOUS
Status: DISPENSED
Start: 2023-01-03

## (undated) RX ORDER — CARDIOPLEG/ORGAN PRESERV NO.1 9-198-2-1
BOTTLE PERFUSION
Status: DISPENSED
Start: 2023-01-03

## (undated) RX ORDER — FENTANYL CITRATE-0.9 % NACL/PF 10 MCG/ML
PLASTIC BAG, INJECTION (ML) INTRAVENOUS
Status: DISPENSED
Start: 2023-01-03

## (undated) RX ORDER — GABAPENTIN 300 MG/1
CAPSULE ORAL
Status: DISPENSED
Start: 2023-01-03

## (undated) RX ORDER — DEXTROSE, SODIUM CHLORIDE, SODIUM LACTATE, POTASSIUM CHLORIDE, AND CALCIUM CHLORIDE 5; .6; .31; .03; .02 G/100ML; G/100ML; G/100ML; G/100ML; G/100ML
INJECTION, SOLUTION INTRAVENOUS
Status: DISPENSED
Start: 2023-01-03

## (undated) RX ORDER — ONDANSETRON 2 MG/ML
INJECTION INTRAMUSCULAR; INTRAVENOUS
Status: DISPENSED
Start: 2023-01-03

## (undated) RX ORDER — PROPOFOL 10 MG/ML
INJECTION, EMULSION INTRAVENOUS
Status: DISPENSED
Start: 2023-01-03

## (undated) RX ORDER — SODIUM CHLORIDE, SODIUM LACTATE, POTASSIUM CHLORIDE, CALCIUM CHLORIDE 600; 310; 30; 20 MG/100ML; MG/100ML; MG/100ML; MG/100ML
INJECTION, SOLUTION INTRAVENOUS
Status: DISPENSED
Start: 2023-01-03

## (undated) RX ORDER — ESMOLOL HYDROCHLORIDE 10 MG/ML
INJECTION INTRAVENOUS
Status: DISPENSED
Start: 2023-01-03